# Patient Record
Sex: FEMALE | Race: BLACK OR AFRICAN AMERICAN | NOT HISPANIC OR LATINO | URBAN - METROPOLITAN AREA
[De-identification: names, ages, dates, MRNs, and addresses within clinical notes are randomized per-mention and may not be internally consistent; named-entity substitution may affect disease eponyms.]

---

## 2017-04-10 ENCOUNTER — EMERGENCY (EMERGENCY)
Facility: HOSPITAL | Age: 72
LOS: 1 days | Discharge: HOME | End: 2017-04-10
Attending: PERSONAL EMERGENCY RESPONSE ATTENDANT | Admitting: PERSONAL EMERGENCY RESPONSE ATTENDANT
Payer: COMMERCIAL

## 2017-04-10 VITALS
HEART RATE: 71 BPM | RESPIRATION RATE: 16 BRPM | OXYGEN SATURATION: 97 % | SYSTOLIC BLOOD PRESSURE: 140 MMHG | DIASTOLIC BLOOD PRESSURE: 60 MMHG

## 2017-04-10 VITALS
SYSTOLIC BLOOD PRESSURE: 132 MMHG | OXYGEN SATURATION: 100 % | TEMPERATURE: 98 F | RESPIRATION RATE: 18 BRPM | HEART RATE: 63 BPM | DIASTOLIC BLOOD PRESSURE: 80 MMHG

## 2017-04-10 LAB
ALBUMIN SERPL ELPH-MCNC: 3.2 G/DL — LOW (ref 3.3–5)
ALP SERPL-CCNC: 67 U/L — SIGNIFICANT CHANGE UP (ref 40–120)
ALT FLD-CCNC: 10 U/L RC — SIGNIFICANT CHANGE UP (ref 10–45)
ANION GAP SERPL CALC-SCNC: 16 MMOL/L — SIGNIFICANT CHANGE UP (ref 5–17)
APTT BLD: 34 SEC — SIGNIFICANT CHANGE UP (ref 27.5–37.4)
AST SERPL-CCNC: 28 U/L — SIGNIFICANT CHANGE UP (ref 10–40)
BASOPHILS # BLD AUTO: 0.1 K/UL — SIGNIFICANT CHANGE UP (ref 0–0.2)
BASOPHILS NFR BLD AUTO: 0.4 % — SIGNIFICANT CHANGE UP (ref 0–2)
BILIRUB SERPL-MCNC: 0.5 MG/DL — SIGNIFICANT CHANGE UP (ref 0.2–1.2)
BUN SERPL-MCNC: 15 MG/DL — SIGNIFICANT CHANGE UP (ref 7–23)
CALCIUM SERPL-MCNC: 9.7 MG/DL — SIGNIFICANT CHANGE UP (ref 8.4–10.5)
CHLORIDE SERPL-SCNC: 101 MMOL/L — SIGNIFICANT CHANGE UP (ref 96–108)
CK MB BLD-MCNC: 3.4 % — SIGNIFICANT CHANGE UP (ref 0–3.5)
CK MB CFR SERPL CALC: 5.5 NG/ML — HIGH (ref 0–3.8)
CK SERPL-CCNC: 162 U/L — SIGNIFICANT CHANGE UP (ref 25–170)
CO2 SERPL-SCNC: 26 MMOL/L — SIGNIFICANT CHANGE UP (ref 22–31)
CREAT SERPL-MCNC: 0.69 MG/DL — SIGNIFICANT CHANGE UP (ref 0.5–1.3)
EOSINOPHIL # BLD AUTO: 0.2 K/UL — SIGNIFICANT CHANGE UP (ref 0–0.5)
EOSINOPHIL NFR BLD AUTO: 1 % — SIGNIFICANT CHANGE UP (ref 0–6)
GAS PNL BLDV: SIGNIFICANT CHANGE UP
GLUCOSE SERPL-MCNC: 98 MG/DL — SIGNIFICANT CHANGE UP (ref 70–99)
HCT VFR BLD CALC: 28.7 % — LOW (ref 34.5–45)
HGB BLD-MCNC: 8.8 G/DL — LOW (ref 11.5–15.5)
INR BLD: 1.91 RATIO — HIGH (ref 0.88–1.16)
LYMPHOCYTES # BLD AUTO: 1.8 K/UL — SIGNIFICANT CHANGE UP (ref 1–3.3)
LYMPHOCYTES # BLD AUTO: 9.5 % — LOW (ref 13–44)
MCHC RBC-ENTMCNC: 25 PG — LOW (ref 27–34)
MCHC RBC-ENTMCNC: 30.8 GM/DL — LOW (ref 32–36)
MCV RBC AUTO: 81.4 FL — SIGNIFICANT CHANGE UP (ref 80–100)
MONOCYTES # BLD AUTO: 1.6 K/UL — HIGH (ref 0–0.9)
MONOCYTES NFR BLD AUTO: 8.5 % — SIGNIFICANT CHANGE UP (ref 2–14)
NEUTROPHILS # BLD AUTO: 15.1 K/UL — HIGH (ref 1.8–7.4)
NEUTROPHILS NFR BLD AUTO: 80.6 % — HIGH (ref 43–77)
PLATELET # BLD AUTO: 388 K/UL — SIGNIFICANT CHANGE UP (ref 150–400)
POTASSIUM SERPL-MCNC: 4.7 MMOL/L — SIGNIFICANT CHANGE UP (ref 3.5–5.3)
POTASSIUM SERPL-SCNC: 4.7 MMOL/L — SIGNIFICANT CHANGE UP (ref 3.5–5.3)
PROT SERPL-MCNC: 7.8 G/DL — SIGNIFICANT CHANGE UP (ref 6–8.3)
PROTHROM AB SERPL-ACNC: 21.1 SEC — HIGH (ref 9.8–12.7)
RBC # BLD: 3.53 M/UL — LOW (ref 3.8–5.2)
RBC # FLD: 15.4 % — HIGH (ref 10.3–14.5)
SODIUM SERPL-SCNC: 143 MMOL/L — SIGNIFICANT CHANGE UP (ref 135–145)
TROPONIN T SERPL-MCNC: <0.01 NG/ML — SIGNIFICANT CHANGE UP (ref 0–0.06)
TROPONIN T SERPL-MCNC: <0.01 NG/ML — SIGNIFICANT CHANGE UP (ref 0–0.06)
WBC # BLD: 18.7 K/UL — HIGH (ref 3.8–10.5)
WBC # FLD AUTO: 18.7 K/UL — HIGH (ref 3.8–10.5)

## 2017-04-10 PROCEDURE — 72170 X-RAY EXAM OF PELVIS: CPT

## 2017-04-10 PROCEDURE — 73564 X-RAY EXAM KNEE 4 OR MORE: CPT | Mod: 26,50

## 2017-04-10 PROCEDURE — 85014 HEMATOCRIT: CPT

## 2017-04-10 PROCEDURE — 71260 CT THORAX DX C+: CPT

## 2017-04-10 PROCEDURE — 85730 THROMBOPLASTIN TIME PARTIAL: CPT

## 2017-04-10 PROCEDURE — 85610 PROTHROMBIN TIME: CPT

## 2017-04-10 PROCEDURE — 82962 GLUCOSE BLOOD TEST: CPT

## 2017-04-10 PROCEDURE — 82947 ASSAY GLUCOSE BLOOD QUANT: CPT

## 2017-04-10 PROCEDURE — 86301 IMMUNOASSAY TUMOR CA 19-9: CPT

## 2017-04-10 PROCEDURE — 82378 CARCINOEMBRYONIC ANTIGEN: CPT

## 2017-04-10 PROCEDURE — 80053 COMPREHEN METABOLIC PANEL: CPT

## 2017-04-10 PROCEDURE — 72170 X-RAY EXAM OF PELVIS: CPT | Mod: 26

## 2017-04-10 PROCEDURE — 74177 CT ABD & PELVIS W/CONTRAST: CPT | Mod: 26

## 2017-04-10 PROCEDURE — 70450 CT HEAD/BRAIN W/O DYE: CPT

## 2017-04-10 PROCEDURE — 71045 X-RAY EXAM CHEST 1 VIEW: CPT

## 2017-04-10 PROCEDURE — 84484 ASSAY OF TROPONIN QUANT: CPT

## 2017-04-10 PROCEDURE — 71010: CPT | Mod: 26

## 2017-04-10 PROCEDURE — 82553 CREATINE MB FRACTION: CPT

## 2017-04-10 PROCEDURE — 82550 ASSAY OF CK (CPK): CPT

## 2017-04-10 PROCEDURE — 99285 EMERGENCY DEPT VISIT HI MDM: CPT

## 2017-04-10 PROCEDURE — 72125 CT NECK SPINE W/O DYE: CPT

## 2017-04-10 PROCEDURE — 86304 IMMUNOASSAY TUMOR CA 125: CPT

## 2017-04-10 PROCEDURE — 82803 BLOOD GASES ANY COMBINATION: CPT

## 2017-04-10 PROCEDURE — 72125 CT NECK SPINE W/O DYE: CPT | Mod: 26

## 2017-04-10 PROCEDURE — 74177 CT ABD & PELVIS W/CONTRAST: CPT

## 2017-04-10 PROCEDURE — 82435 ASSAY OF BLOOD CHLORIDE: CPT

## 2017-04-10 PROCEDURE — 73564 X-RAY EXAM KNEE 4 OR MORE: CPT

## 2017-04-10 PROCEDURE — 82330 ASSAY OF CALCIUM: CPT

## 2017-04-10 PROCEDURE — 70450 CT HEAD/BRAIN W/O DYE: CPT | Mod: 26

## 2017-04-10 PROCEDURE — 71260 CT THORAX DX C+: CPT | Mod: 26

## 2017-04-10 PROCEDURE — 85027 COMPLETE CBC AUTOMATED: CPT

## 2017-04-10 PROCEDURE — 99284 EMERGENCY DEPT VISIT MOD MDM: CPT | Mod: 25

## 2017-04-10 PROCEDURE — 83605 ASSAY OF LACTIC ACID: CPT

## 2017-04-10 PROCEDURE — 84295 ASSAY OF SERUM SODIUM: CPT

## 2017-04-10 PROCEDURE — 84132 ASSAY OF SERUM POTASSIUM: CPT

## 2017-04-10 RX ORDER — SODIUM CHLORIDE 9 MG/ML
500 INJECTION INTRAMUSCULAR; INTRAVENOUS; SUBCUTANEOUS ONCE
Qty: 0 | Refills: 0 | Status: COMPLETED | OUTPATIENT
Start: 2017-04-10 | End: 2017-04-10

## 2017-04-10 RX ADMIN — SODIUM CHLORIDE 1000 MILLILITER(S): 9 INJECTION INTRAMUSCULAR; INTRAVENOUS; SUBCUTANEOUS at 12:22

## 2017-04-10 NOTE — ED PROVIDER NOTE - PROGRESS NOTE DETAILS
Patient reassessed, All results discussed, including CT findings of likely metastatic ovarian malignancy, with granddaughter in room.  All questions answered.  Gyn called for further recommendations and will see pt in ED. Pt seen and evaluated by gyn - recommending drawing of tumor markers and follow up in oncology clinic as outpatient next week. -meek luz

## 2017-04-10 NOTE — ED ADULT NURSE REASSESSMENT NOTE - NS ED NURSE REASSESS COMMENT FT1
report received from rn sam. pt. a+ox3, in no acute distress, breathing unlabored on RA. Skin warm dry and of color appropriate for ethnicity. comfort and safety maintained. family at bedside.

## 2017-04-10 NOTE — ED ADULT NURSE NOTE - OBJECTIVE STATEMENT
72 yr old female to ed via ems s/p c/o feeling dizzy, bent down to  quarter and fell down on knees. On eliquis for afib. Denies chest pain Denies sob. Cap refill wnl Conjunctiva pink Skin turgor norm.  Denies abd pain Denies n/v. No facial droop or slurred speech Moving all four extremities. Denies numbness or weakness.

## 2017-04-10 NOTE — ED PROVIDER NOTE - MEDICAL DECISION MAKING DETAILS
72F on eliquis sp fall ?syncope vs mechanical unclear story - will get labs/trops/ct head/cxr, reassess. -meek luz

## 2017-04-10 NOTE — ED PROVIDER NOTE - CARE PLAN
Principal Discharge DX:	Fall from standing, initial encounter Principal Discharge DX:	Fall from standing, initial encounter  Secondary Diagnosis:	Ovarian cancer, primary, with metastasis from ovary to other site Principal Discharge DX:	Fall from standing, initial encounter  Instructions for follow-up, activity and diet:	Follow up with gynecology-oncology in 1-2 days - call [phone] for an appointment.  Use tylenol as needed for pain. Tylenol 1000mg every 8 hours.  Read all handouts provided. Return to ED if you have worsening pain or further concerns.  Secondary Diagnosis:	Ovarian cancer, primary, with metastasis from ovary to other site Principal Discharge DX:	Fall from standing, initial encounter  Instructions for follow-up, activity and diet:	Follow up with gynecology-oncology in 1-2 days - call [phone] for an appointment.  Use tylenol as needed for pain. Tylenol 1000mg every 8 hours.  Read all handouts provided. Return to ED if you have worsening pain or further concerns.  Secondary Diagnosis:	Ovarian cancer, primary, with metastasis from ovary to other site

## 2017-04-10 NOTE — ED PROVIDER NOTE - PLAN OF CARE
Follow up with gynecology-oncology in 1-2 days - call [phone] for an appointment.  Use tylenol as needed for pain. Tylenol 1000mg every 8 hours.  Read all handouts provided. Return to ED if you have worsening pain or further concerns.

## 2017-04-10 NOTE — ED PROVIDER NOTE - ATTENDING CONTRIBUTION TO CARE
Agree with above.  Pt is a 72 yr old female presenting to ED with complaint of fall from standing with vague memory of event.  She was on a bus and felt queezy with abdominal cramping and got off the bus.  She dropped a quarter, bent over to pick it up and fell to bilateral knees.  She is on eliquis for afib.  Pt denies head injury and did not fall hitting head.  Able to ambulate s/p fall.  No hip pain. No midline neck/spinal pain.  Pt has no further abdominal discomfort.

## 2017-04-10 NOTE — ED PROVIDER NOTE - OBJECTIVE STATEMENT
72F hx afib on eliquis, HTN presents sp fall today - pt reports she took her medications on an empty stomach, went out to go shopping and got off the bus and dropped a quarter, when bent down to pick it up fell onto knees.  No lightheadedness/dizziness/chest pain/sob prior to fall.  Did not hit head no LOC.  Co bilateral knee pain.      PMD -

## 2017-04-11 LAB
CANCER AG125 SERPL-ACNC: 73 U/ML — HIGH
CANCER AG19-9 SERPL-ACNC: 33.7 U/ML — SIGNIFICANT CHANGE UP
CEA SERPL-MCNC: 1 NG/ML — SIGNIFICANT CHANGE UP (ref 0–3.8)

## 2017-04-12 NOTE — ED POST DISCHARGE NOTE - ADDITIONAL DOCUMENTATION
ARMY:  Pt was aware that these markers were sent and is aware of probable Dx and is aware she must follow-up with gyn.  Gyn aware of need for follow-up.
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
## 2017-04-14 DIAGNOSIS — S80.02XA CONTUSION OF LEFT KNEE, INITIAL ENCOUNTER: ICD-10-CM

## 2017-04-14 DIAGNOSIS — I10 ESSENTIAL (PRIMARY) HYPERTENSION: ICD-10-CM
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
## 2017-04-14 DIAGNOSIS — C56.9 MALIGNANT NEOPLASM OF UNSPECIFIED OVARY: ICD-10-CM

## 2017-04-14 DIAGNOSIS — M25.562 PAIN IN LEFT KNEE: ICD-10-CM
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
## 2017-04-14 DIAGNOSIS — M25.561 PAIN IN RIGHT KNEE: ICD-10-CM

## 2017-04-17 ENCOUNTER — RECORD ABSTRACTING (OUTPATIENT)
Age: 72
End: 2017-04-17

## 2017-04-17 DIAGNOSIS — Z86.79 PERSONAL HISTORY OF OTHER DISEASES OF THE CIRCULATORY SYSTEM: ICD-10-CM

## 2017-04-17 DIAGNOSIS — Z87.09 PERSONAL HISTORY OF OTHER DISEASES OF THE RESPIRATORY SYSTEM: ICD-10-CM

## 2017-04-19 ENCOUNTER — APPOINTMENT (OUTPATIENT)
Dept: GYNECOLOGIC ONCOLOGY | Facility: CLINIC | Age: 72
End: 2017-04-19

## 2017-04-19 VITALS
WEIGHT: 140 LBS | BODY MASS INDEX: 27.48 KG/M2 | DIASTOLIC BLOOD PRESSURE: 70 MMHG | HEIGHT: 60 IN | SYSTOLIC BLOOD PRESSURE: 140 MMHG

## 2017-04-19 DIAGNOSIS — K42.9 UMBILICAL HERNIA W/OUT OBSTRUCTION OR GANGRENE: ICD-10-CM

## 2017-04-19 DIAGNOSIS — R19.00 INTRA-ABDOMINAL AND PELVIC SWELLING, MASS AND LUMP, UNSPECIFIED SITE: ICD-10-CM

## 2017-04-19 DIAGNOSIS — R59.0 LOCALIZED ENLARGED LYMPH NODES: ICD-10-CM

## 2017-04-19 DIAGNOSIS — D64.9 ANEMIA, UNSPECIFIED: ICD-10-CM

## 2017-04-19 DIAGNOSIS — R97.1 ELEVATED CANCER ANTIGEN 125 [CA 125]: ICD-10-CM

## 2017-04-19 PROBLEM — Z87.09 HISTORY OF ASTHMA: Status: RESOLVED | Noted: 2017-04-19 | Resolved: 2017-04-19

## 2017-04-19 PROBLEM — Z86.79 HISTORY OF ATRIAL FIBRILLATION: Status: RESOLVED | Noted: 2017-04-19 | Resolved: 2017-04-19

## 2017-04-19 PROBLEM — Z86.79 HISTORY OF ESSENTIAL HYPERTENSION: Status: RESOLVED | Noted: 2017-04-19 | Resolved: 2017-04-19

## 2017-04-19 RX ORDER — ALBUTEROL SULFATE 90 UG/1
AEROSOL, METERED RESPIRATORY (INHALATION)
Refills: 0 | Status: ACTIVE | COMMUNITY

## 2017-04-19 RX ORDER — BUDESONIDE AND FORMOTEROL FUMARATE DIHYDRATE 160; 4.5 UG/1; UG/1
AEROSOL RESPIRATORY (INHALATION)
Refills: 0 | Status: ACTIVE | COMMUNITY

## 2017-04-19 RX ORDER — HYDROCHLOROTHIAZIDE 12.5 MG/1
12.5 CAPSULE ORAL
Refills: 0 | Status: ACTIVE | COMMUNITY

## 2017-04-19 RX ORDER — METOPROLOL SUCCINATE 25 MG/1
25 TABLET, EXTENDED RELEASE ORAL
Refills: 0 | Status: ACTIVE | COMMUNITY

## 2017-04-19 RX ORDER — LOSARTAN POTASSIUM 25 MG/1
25 TABLET, FILM COATED ORAL
Refills: 0 | Status: ACTIVE | COMMUNITY

## 2017-04-19 RX ORDER — MONTELUKAST 10 MG/1
10 TABLET, FILM COATED ORAL
Refills: 0 | Status: ACTIVE | COMMUNITY

## 2017-04-19 RX ORDER — APIXABAN 2.5 MG/1
2.5 TABLET, FILM COATED ORAL
Refills: 0 | Status: ACTIVE | COMMUNITY

## 2017-04-21 ENCOUNTER — OTHER (OUTPATIENT)
Age: 72
End: 2017-04-21

## 2017-04-28 LAB — CORE LAB BIOPSY: NORMAL

## 2017-04-30 ENCOUNTER — OUTPATIENT (OUTPATIENT)
Dept: OUTPATIENT SERVICES | Facility: HOSPITAL | Age: 72
LOS: 1 days | End: 2017-04-30
Payer: COMMERCIAL

## 2017-04-30 ENCOUNTER — APPOINTMENT (OUTPATIENT)
Dept: NUCLEAR MEDICINE | Facility: IMAGING CENTER | Age: 72
End: 2017-04-30

## 2017-04-30 DIAGNOSIS — Z00.8 ENCOUNTER FOR OTHER GENERAL EXAMINATION: ICD-10-CM

## 2017-04-30 PROCEDURE — A9552: CPT

## 2017-04-30 PROCEDURE — 78815 PET IMAGE W/CT SKULL-THIGH: CPT

## 2017-05-05 ENCOUNTER — OTHER (OUTPATIENT)
Age: 72
End: 2017-05-05

## 2017-05-09 ENCOUNTER — OUTPATIENT (OUTPATIENT)
Dept: OUTPATIENT SERVICES | Facility: HOSPITAL | Age: 72
LOS: 1 days | Discharge: HOME | End: 2017-05-09

## 2017-05-09 DIAGNOSIS — C54.9 MALIGNANT NEOPLASM OF CORPUS UTERI, UNSPECIFIED: ICD-10-CM

## 2017-05-09 PROBLEM — K42.9 UMBILICAL HERNIA: Status: ACTIVE | Noted: 2017-05-09

## 2017-05-09 PROBLEM — D64.9 ANEMIA: Status: ACTIVE | Noted: 2017-05-09

## 2017-05-11 ENCOUNTER — APPOINTMENT (OUTPATIENT)
Dept: HEMATOLOGY ONCOLOGY | Facility: CLINIC | Age: 72
End: 2017-05-11

## 2017-05-11 ENCOUNTER — RESULT REVIEW (OUTPATIENT)
Age: 72
End: 2017-05-11

## 2017-05-11 VITALS
SYSTOLIC BLOOD PRESSURE: 110 MMHG | RESPIRATION RATE: 17 BRPM | HEIGHT: 60.63 IN | OXYGEN SATURATION: 100 % | DIASTOLIC BLOOD PRESSURE: 60 MMHG | WEIGHT: 121.25 LBS | BODY MASS INDEX: 23.19 KG/M2 | HEART RATE: 88 BPM | TEMPERATURE: 97.6 F

## 2017-05-11 DIAGNOSIS — C80.1 MALIGNANT (PRIMARY) NEOPLASM, UNSPECIFIED: ICD-10-CM

## 2017-05-11 LAB
APTT BLD: 31.2 SEC
HCT VFR BLD CALC: 23.6 % — LOW (ref 34.5–45)
HGB BLD-MCNC: 7.2 G/DL — LOW (ref 11.5–15.5)
INR PPP: 1.26 RATIO
MCHC RBC-ENTMCNC: 22.1 PG — LOW (ref 27–34)
MCHC RBC-ENTMCNC: 30.6 G/DL — LOW (ref 32–36)
MCV RBC AUTO: 72.4 FL — LOW (ref 80–100)
PLATELET # BLD AUTO: 757 K/UL — HIGH (ref 150–400)
PT BLD: 14.3 SEC
RBC # BLD: 3.27 M/UL — LOW (ref 3.8–5.2)
RBC # FLD: 18 % — HIGH (ref 10.3–14.5)
WBC # BLD: 21.5 K/UL — HIGH (ref 3.8–10.5)
WBC # FLD AUTO: 21.5 K/UL — HIGH (ref 3.8–10.5)

## 2017-05-11 RX ORDER — ONDANSETRON 8 MG/1
8 TABLET ORAL
Qty: 30 | Refills: 2 | Status: ACTIVE | COMMUNITY
Start: 2017-05-11 | End: 1900-01-01

## 2017-05-11 RX ORDER — PROCHLORPERAZINE MALEATE 10 MG/1
10 TABLET ORAL EVERY 6 HOURS
Qty: 30 | Refills: 2 | Status: ACTIVE | COMMUNITY
Start: 2017-05-11 | End: 1900-01-01

## 2017-05-12 DIAGNOSIS — C54.1 MALIGNANT NEOPLASM OF ENDOMETRIUM: ICD-10-CM

## 2017-05-12 LAB
ALBUMIN SERPL ELPH-MCNC: 2.9 G/DL
ALP BLD-CCNC: 113 U/L
ALT SERPL-CCNC: 4 U/L
ANION GAP SERPL CALC-SCNC: 25 MMOL/L
AST SERPL-CCNC: 18 U/L
BILIRUB SERPL-MCNC: 1 MG/DL
BUN SERPL-MCNC: 39 MG/DL
CALCIUM SERPL-MCNC: 9.1 MG/DL
CANCER AG125 SERPL-ACNC: 208 U/ML
CEA SERPL-MCNC: 0.4 NG/ML
CHLORIDE SERPL-SCNC: 93 MMOL/L
CO2 SERPL-SCNC: 18 MMOL/L
CREAT SERPL-MCNC: 1.94 MG/DL
GLUCOSE SERPL-MCNC: 86 MG/DL
HAV IGM SER QL: NONREACTIVE
HBV CORE IGM SER QL: NONREACTIVE
HBV SURFACE AG SER QL: NONREACTIVE
HCV AB SER QL: NONREACTIVE
HCV S/CO RATIO: 0.52 S/CO
MAGNESIUM SERPL-MCNC: 2.4 MG/DL
POTASSIUM SERPL-SCNC: 4.1 MMOL/L
PROT SERPL-MCNC: 7.4 G/DL
SODIUM SERPL-SCNC: 136 MMOL/L

## 2017-05-17 ENCOUNTER — APPOINTMENT (OUTPATIENT)
Dept: INFUSION THERAPY | Facility: HOSPITAL | Age: 72
End: 2017-05-17

## 2017-05-17 ENCOUNTER — RESULT REVIEW (OUTPATIENT)
Age: 72
End: 2017-05-17

## 2017-05-17 ENCOUNTER — OUTPATIENT (OUTPATIENT)
Dept: OUTPATIENT SERVICES | Facility: HOSPITAL | Age: 72
LOS: 1 days | End: 2017-05-17
Payer: COMMERCIAL

## 2017-05-17 ENCOUNTER — OTHER (OUTPATIENT)
Age: 72
End: 2017-05-17

## 2017-05-17 LAB
ANISOCYTOSIS BLD QL: SLIGHT — SIGNIFICANT CHANGE UP
BASOPHILS # BLD AUTO: 0.1 K/UL — SIGNIFICANT CHANGE UP (ref 0–0.2)
BLD GP AB SCN SERPL QL: NEGATIVE — SIGNIFICANT CHANGE UP
ELLIPTOCYTES BLD QL SMEAR: SLIGHT — SIGNIFICANT CHANGE UP
EOSINOPHIL # BLD AUTO: 0 K/UL — SIGNIFICANT CHANGE UP (ref 0–0.5)
EOSINOPHIL NFR BLD AUTO: 1 % — SIGNIFICANT CHANGE UP (ref 0–6)
HCT VFR BLD CALC: 20.3 % — CRITICAL LOW (ref 34.5–45)
HGB BLD-MCNC: 6.3 G/DL — CRITICAL LOW (ref 11.5–15.5)
HYPOCHROMIA BLD QL: SIGNIFICANT CHANGE UP
LYMPHOCYTES # BLD AUTO: 1.8 K/UL — SIGNIFICANT CHANGE UP (ref 1–3.3)
LYMPHOCYTES # BLD AUTO: 10 % — LOW (ref 13–44)
MCHC RBC-ENTMCNC: 22 PG — LOW (ref 27–34)
MCHC RBC-ENTMCNC: 31.2 G/DL — LOW (ref 32–36)
MCV RBC AUTO: 70.6 FL — LOW (ref 80–100)
MICROCYTES BLD QL: SLIGHT — SIGNIFICANT CHANGE UP
MONOCYTES # BLD AUTO: 2 K/UL — HIGH (ref 0–0.9)
MONOCYTES NFR BLD AUTO: 11 % — SIGNIFICANT CHANGE UP (ref 2–14)
NEUTROPHILS # BLD AUTO: 23 K/UL — HIGH (ref 1.8–7.4)
NEUTROPHILS NFR BLD AUTO: 78 % — HIGH (ref 43–77)
PLAT MORPH BLD: NORMAL — SIGNIFICANT CHANGE UP
PLATELET # BLD AUTO: 671 K/UL — HIGH (ref 150–400)
POIKILOCYTOSIS BLD QL AUTO: SLIGHT — SIGNIFICANT CHANGE UP
POLYCHROMASIA BLD QL SMEAR: SLIGHT — SIGNIFICANT CHANGE UP
RBC # BLD: 2.88 M/UL — LOW (ref 3.8–5.2)
RBC # FLD: 18.8 % — HIGH (ref 10.3–14.5)
RBC BLD AUTO: ABNORMAL
RH IG SCN BLD-IMP: POSITIVE — SIGNIFICANT CHANGE UP
TARGETS BLD QL SMEAR: SLIGHT — SIGNIFICANT CHANGE UP
WBC # BLD: 26.9 K/UL — HIGH (ref 3.8–10.5)
WBC # FLD AUTO: 26.9 K/UL — HIGH (ref 3.8–10.5)

## 2017-05-18 DIAGNOSIS — R11.2 NAUSEA WITH VOMITING, UNSPECIFIED: ICD-10-CM

## 2017-05-18 DIAGNOSIS — C54.1 MALIGNANT NEOPLASM OF ENDOMETRIUM: ICD-10-CM

## 2017-05-18 DIAGNOSIS — Z51.11 ENCOUNTER FOR ANTINEOPLASTIC CHEMOTHERAPY: ICD-10-CM

## 2017-05-19 PROCEDURE — 86923 COMPATIBILITY TEST ELECTRIC: CPT

## 2017-05-19 PROCEDURE — 86901 BLOOD TYPING SEROLOGIC RH(D): CPT

## 2017-05-19 PROCEDURE — 86900 BLOOD TYPING SEROLOGIC ABO: CPT

## 2017-05-19 PROCEDURE — 86850 RBC ANTIBODY SCREEN: CPT

## 2017-05-20 ENCOUNTER — APPOINTMENT (OUTPATIENT)
Dept: INFUSION THERAPY | Facility: HOSPITAL | Age: 72
End: 2017-05-20

## 2017-05-23 DIAGNOSIS — Z51.89 ENCOUNTER FOR OTHER SPECIFIED AFTERCARE: ICD-10-CM

## 2017-05-31 ENCOUNTER — RESULT REVIEW (OUTPATIENT)
Age: 72
End: 2017-05-31

## 2017-05-31 ENCOUNTER — APPOINTMENT (OUTPATIENT)
Dept: HEMATOLOGY ONCOLOGY | Facility: CLINIC | Age: 72
End: 2017-05-31

## 2017-05-31 VITALS
RESPIRATION RATE: 16 BRPM | BODY MASS INDEX: 21.5 KG/M2 | HEART RATE: 97 BPM | SYSTOLIC BLOOD PRESSURE: 140 MMHG | DIASTOLIC BLOOD PRESSURE: 80 MMHG | OXYGEN SATURATION: 98 % | TEMPERATURE: 98.7 F | WEIGHT: 112.44 LBS

## 2017-05-31 DIAGNOSIS — E87.6 HYPOKALEMIA: ICD-10-CM

## 2017-05-31 LAB
HCT VFR BLD CALC: 29.2 % — LOW (ref 34.5–45)
HGB BLD-MCNC: 9.1 G/DL — LOW (ref 11.5–15.5)
MCHC RBC-ENTMCNC: 24.3 PG — LOW (ref 27–34)
MCHC RBC-ENTMCNC: 31.3 G/DL — LOW (ref 32–36)
MCV RBC AUTO: 77.6 FL — LOW (ref 80–100)
PLATELET # BLD AUTO: 296 K/UL — SIGNIFICANT CHANGE UP (ref 150–400)
RBC # BLD: 3.76 M/UL — LOW (ref 3.8–5.2)
RBC # FLD: 22.4 % — HIGH (ref 10.3–14.5)
WBC # BLD: 14.8 K/UL — HIGH (ref 3.8–10.5)
WBC # FLD AUTO: 14.8 K/UL — HIGH (ref 3.8–10.5)

## 2017-06-01 LAB
ALBUMIN SERPL ELPH-MCNC: 3.1 G/DL
ALP BLD-CCNC: 92 U/L
ALT SERPL-CCNC: 20 U/L
ANION GAP SERPL CALC-SCNC: 16 MMOL/L
AST SERPL-CCNC: 16 U/L
BILIRUB SERPL-MCNC: 0.5 MG/DL
BUN SERPL-MCNC: 22 MG/DL
CALCIUM SERPL-MCNC: 8.8 MG/DL
CANCER AG125 SERPL-ACNC: 78 U/ML
CEA SERPL-MCNC: 1 NG/ML
CHLORIDE SERPL-SCNC: 97 MMOL/L
CO2 SERPL-SCNC: 27 MMOL/L
CREAT SERPL-MCNC: 0.75 MG/DL
GLUCOSE SERPL-MCNC: 102 MG/DL
POTASSIUM SERPL-SCNC: 3.1 MMOL/L
PROT SERPL-MCNC: 7.4 G/DL
SODIUM SERPL-SCNC: 140 MMOL/L

## 2017-06-02 PROBLEM — E87.6 HYPOKALEMIA: Status: ACTIVE | Noted: 2017-06-02

## 2017-06-07 ENCOUNTER — APPOINTMENT (OUTPATIENT)
Dept: INFUSION THERAPY | Facility: HOSPITAL | Age: 72
End: 2017-06-07

## 2017-06-07 ENCOUNTER — LABORATORY RESULT (OUTPATIENT)
Age: 72
End: 2017-06-07

## 2017-06-07 ENCOUNTER — RESULT REVIEW (OUTPATIENT)
Age: 72
End: 2017-06-07

## 2017-06-07 LAB
HCT VFR BLD CALC: 28 % — LOW (ref 34.5–45)
HGB BLD-MCNC: 8.4 G/DL — LOW (ref 11.5–15.5)
MCHC RBC-ENTMCNC: 23.8 PG — LOW (ref 27–34)
MCHC RBC-ENTMCNC: 30 G/DL — LOW (ref 32–36)
MCV RBC AUTO: 79.1 FL — LOW (ref 80–100)
PLATELET # BLD AUTO: 896 K/UL — HIGH (ref 150–400)
RBC # BLD: 3.54 M/UL — LOW (ref 3.8–5.2)
RBC # FLD: 22.9 % — HIGH (ref 10.3–14.5)
WBC # BLD: 19.4 K/UL — HIGH (ref 3.8–10.5)
WBC # FLD AUTO: 19.4 K/UL — HIGH (ref 3.8–10.5)

## 2017-06-14 ENCOUNTER — OUTPATIENT (OUTPATIENT)
Dept: OUTPATIENT SERVICES | Facility: HOSPITAL | Age: 72
LOS: 1 days | Discharge: HOME | End: 2017-06-14

## 2017-06-14 DIAGNOSIS — C54.1 MALIGNANT NEOPLASM OF ENDOMETRIUM: ICD-10-CM

## 2017-06-21 ENCOUNTER — RESULT REVIEW (OUTPATIENT)
Age: 72
End: 2017-06-21

## 2017-06-21 ENCOUNTER — APPOINTMENT (OUTPATIENT)
Dept: HEMATOLOGY ONCOLOGY | Facility: CLINIC | Age: 72
End: 2017-06-21

## 2017-06-21 VITALS
SYSTOLIC BLOOD PRESSURE: 121 MMHG | BODY MASS INDEX: 20.91 KG/M2 | WEIGHT: 109.35 LBS | OXYGEN SATURATION: 100 % | RESPIRATION RATE: 16 BRPM | DIASTOLIC BLOOD PRESSURE: 71 MMHG | TEMPERATURE: 98.7 F | HEART RATE: 77 BPM

## 2017-06-21 LAB
HCT VFR BLD CALC: 22.5 % — LOW (ref 34.5–45)
HGB BLD-MCNC: 7 G/DL — CRITICAL LOW (ref 11.5–15.5)
MCHC RBC-ENTMCNC: 24.5 PG — LOW (ref 27–34)
MCHC RBC-ENTMCNC: 30.9 G/DL — LOW (ref 32–36)
MCV RBC AUTO: 79.3 FL — LOW (ref 80–100)
PLATELET # BLD AUTO: 442 K/UL — HIGH (ref 150–400)
RBC # BLD: 2.84 M/UL — LOW (ref 3.8–5.2)
RBC # FLD: 25.5 % — HIGH (ref 10.3–14.5)
WBC # BLD: 17.7 K/UL — HIGH (ref 3.8–10.5)
WBC # FLD AUTO: 17.7 K/UL — HIGH (ref 3.8–10.5)

## 2017-06-22 ENCOUNTER — OUTPATIENT (OUTPATIENT)
Dept: OUTPATIENT SERVICES | Facility: HOSPITAL | Age: 72
LOS: 1 days | End: 2017-06-22
Payer: COMMERCIAL

## 2017-06-22 DIAGNOSIS — C54.1 MALIGNANT NEOPLASM OF ENDOMETRIUM: ICD-10-CM

## 2017-06-22 LAB
ALBUMIN SERPL ELPH-MCNC: 2.8 G/DL
ALP BLD-CCNC: 81 U/L
ALT SERPL-CCNC: 18 U/L
ANION GAP SERPL CALC-SCNC: 16 MMOL/L
AST SERPL-CCNC: 19 U/L
BILIRUB SERPL-MCNC: 0.4 MG/DL
BUN SERPL-MCNC: 9 MG/DL
CALCIUM SERPL-MCNC: 8.8 MG/DL
CANCER AG125 SERPL-ACNC: 34 U/ML
CEA SERPL-MCNC: 1.6 NG/ML
CHLORIDE SERPL-SCNC: 100 MMOL/L
CO2 SERPL-SCNC: 27 MMOL/L
CREAT SERPL-MCNC: 0.63 MG/DL
GLUCOSE SERPL-MCNC: 114 MG/DL
POTASSIUM SERPL-SCNC: 3.1 MMOL/L
PROT SERPL-MCNC: 6.9 G/DL
SODIUM SERPL-SCNC: 143 MMOL/L

## 2017-06-23 ENCOUNTER — RESULT REVIEW (OUTPATIENT)
Age: 72
End: 2017-06-23

## 2017-06-23 ENCOUNTER — APPOINTMENT (OUTPATIENT)
Dept: HEMATOLOGY ONCOLOGY | Facility: CLINIC | Age: 72
End: 2017-06-23

## 2017-06-23 LAB
BLD GP AB SCN SERPL QL: NEGATIVE — SIGNIFICANT CHANGE UP
HCT VFR BLD CALC: 21.2 % — LOW (ref 34.5–45)
HGB BLD-MCNC: 6.6 G/DL — CRITICAL LOW (ref 11.5–15.5)
MCHC RBC-ENTMCNC: 24.8 PG — LOW (ref 27–34)
MCHC RBC-ENTMCNC: 31.1 G/DL — LOW (ref 32–36)
MCV RBC AUTO: 79.5 FL — LOW (ref 80–100)
PLATELET # BLD AUTO: 397 K/UL — SIGNIFICANT CHANGE UP (ref 150–400)
RBC # BLD: 2.67 M/UL — LOW (ref 3.8–5.2)
RBC # FLD: 25.3 % — HIGH (ref 10.3–14.5)
RH IG SCN BLD-IMP: POSITIVE — SIGNIFICANT CHANGE UP
WBC # BLD: 24.3 K/UL — HIGH (ref 3.8–10.5)
WBC # FLD AUTO: 24.3 K/UL — HIGH (ref 3.8–10.5)

## 2017-06-24 PROCEDURE — 86900 BLOOD TYPING SEROLOGIC ABO: CPT

## 2017-06-24 PROCEDURE — 86901 BLOOD TYPING SEROLOGIC RH(D): CPT

## 2017-06-24 PROCEDURE — 86923 COMPATIBILITY TEST ELECTRIC: CPT

## 2017-06-24 PROCEDURE — 86850 RBC ANTIBODY SCREEN: CPT

## 2017-06-26 ENCOUNTER — APPOINTMENT (OUTPATIENT)
Dept: INFUSION THERAPY | Facility: HOSPITAL | Age: 72
End: 2017-06-26

## 2017-06-27 DIAGNOSIS — Z51.89 ENCOUNTER FOR OTHER SPECIFIED AFTERCARE: ICD-10-CM

## 2017-06-28 ENCOUNTER — RX RENEWAL (OUTPATIENT)
Age: 72
End: 2017-06-28

## 2017-06-28 ENCOUNTER — RESULT REVIEW (OUTPATIENT)
Age: 72
End: 2017-06-28

## 2017-06-28 ENCOUNTER — LABORATORY RESULT (OUTPATIENT)
Age: 72
End: 2017-06-28

## 2017-06-28 ENCOUNTER — APPOINTMENT (OUTPATIENT)
Dept: INFUSION THERAPY | Facility: HOSPITAL | Age: 72
End: 2017-06-28

## 2017-06-28 LAB
ANISOCYTOSIS BLD QL: SLIGHT — SIGNIFICANT CHANGE UP
BASOPHILS # BLD AUTO: 0 K/UL — SIGNIFICANT CHANGE UP (ref 0–0.2)
BUN SERPL-MCNC: 6 MG/DL — LOW (ref 7–23)
CA-I BLDA-SCNC: 1.07 MMOL/L — LOW (ref 1.12–1.3)
CHLORIDE SERPL-SCNC: 95 MMOL/L — LOW (ref 96–108)
CO2 SERPL-SCNC: 32 MMOL/L — HIGH (ref 22–31)
CREAT SERPL-MCNC: 0.5 MG/DL — SIGNIFICANT CHANGE UP (ref 0.5–1.3)
DACRYOCYTES BLD QL SMEAR: SLIGHT — SIGNIFICANT CHANGE UP
ELLIPTOCYTES BLD QL SMEAR: SLIGHT — SIGNIFICANT CHANGE UP
EOSINOPHIL # BLD AUTO: 0.1 K/UL — SIGNIFICANT CHANGE UP (ref 0–0.5)
GLUCOSE SERPL-MCNC: 95 MG/DL — SIGNIFICANT CHANGE UP (ref 70–99)
HCT VFR BLD CALC: 30.1 % — LOW (ref 34.5–45)
HGB BLD-MCNC: 9.6 G/DL — LOW (ref 11.5–15.5)
HYPOCHROMIA BLD QL: SLIGHT — SIGNIFICANT CHANGE UP
LYMPHOCYTES # BLD AUTO: 2.2 K/UL — SIGNIFICANT CHANGE UP (ref 1–3.3)
LYMPHOCYTES # BLD AUTO: 7 % — LOW (ref 13–44)
MACROCYTES BLD QL: SLIGHT — SIGNIFICANT CHANGE UP
MCHC RBC-ENTMCNC: 27.5 PG — SIGNIFICANT CHANGE UP (ref 27–34)
MCHC RBC-ENTMCNC: 32 G/DL — SIGNIFICANT CHANGE UP (ref 32–36)
MCV RBC AUTO: 86 FL — SIGNIFICANT CHANGE UP (ref 80–100)
MICROCYTES BLD QL: SLIGHT — SIGNIFICANT CHANGE UP
MONOCYTES # BLD AUTO: 1.9 K/UL — HIGH (ref 0–0.9)
MONOCYTES NFR BLD AUTO: 7 % — SIGNIFICANT CHANGE UP (ref 2–14)
NEUTROPHILS # BLD AUTO: 22.8 K/UL — HIGH (ref 1.8–7.4)
NEUTROPHILS NFR BLD AUTO: 86 % — HIGH (ref 43–77)
PLAT MORPH BLD: NORMAL — SIGNIFICANT CHANGE UP
PLATELET # BLD AUTO: 333 K/UL — SIGNIFICANT CHANGE UP (ref 150–400)
POIKILOCYTOSIS BLD QL AUTO: SLIGHT — SIGNIFICANT CHANGE UP
POLYCHROMASIA BLD QL SMEAR: SLIGHT — SIGNIFICANT CHANGE UP
POTASSIUM SERPL-MCNC: 2.8 MMOL/L — CRITICAL LOW (ref 3.5–5.3)
POTASSIUM SERPL-SCNC: 2.8 MMOL/L — CRITICAL LOW (ref 3.5–5.3)
RBC # BLD: 3.5 M/UL — LOW (ref 3.8–5.2)
RBC # FLD: 22.2 % — HIGH (ref 10.3–14.5)
RBC BLD AUTO: ABNORMAL
SODIUM SERPL-SCNC: 141 MMOL/L — SIGNIFICANT CHANGE UP (ref 135–145)
WBC # BLD: 27.1 K/UL — HIGH (ref 3.8–10.5)
WBC # FLD AUTO: 27.1 K/UL — HIGH (ref 3.8–10.5)

## 2017-06-28 RX ORDER — POTASSIUM CHLORIDE 1500 MG/1
20 TABLET, FILM COATED, EXTENDED RELEASE ORAL
Qty: 7 | Refills: 2 | Status: ACTIVE | COMMUNITY
Start: 2017-06-02 | End: 1900-01-01

## 2017-06-29 DIAGNOSIS — Z51.11 ENCOUNTER FOR ANTINEOPLASTIC CHEMOTHERAPY: ICD-10-CM

## 2017-06-29 DIAGNOSIS — R11.2 NAUSEA WITH VOMITING, UNSPECIFIED: ICD-10-CM

## 2017-06-29 DIAGNOSIS — E86.0 DEHYDRATION: ICD-10-CM

## 2017-07-10 ENCOUNTER — APPOINTMENT (OUTPATIENT)
Dept: HEMATOLOGY ONCOLOGY | Facility: CLINIC | Age: 72
End: 2017-07-10

## 2017-07-11 ENCOUNTER — FORM ENCOUNTER (OUTPATIENT)
Age: 72
End: 2017-07-11

## 2017-07-12 ENCOUNTER — APPOINTMENT (OUTPATIENT)
Dept: CT IMAGING | Facility: IMAGING CENTER | Age: 72
End: 2017-07-12

## 2017-07-12 ENCOUNTER — OUTPATIENT (OUTPATIENT)
Dept: OUTPATIENT SERVICES | Facility: HOSPITAL | Age: 72
LOS: 1 days | End: 2017-07-12
Payer: COMMERCIAL

## 2017-07-12 DIAGNOSIS — C54.1 MALIGNANT NEOPLASM OF ENDOMETRIUM: ICD-10-CM

## 2017-07-12 PROCEDURE — 82565 ASSAY OF CREATININE: CPT

## 2017-07-12 PROCEDURE — 74177 CT ABD & PELVIS W/CONTRAST: CPT

## 2017-07-12 PROCEDURE — 71260 CT THORAX DX C+: CPT

## 2017-07-13 ENCOUNTER — OUTPATIENT (OUTPATIENT)
Dept: OUTPATIENT SERVICES | Facility: HOSPITAL | Age: 72
LOS: 1 days | Discharge: HOME | End: 2017-07-13

## 2017-07-13 DIAGNOSIS — C54.1 MALIGNANT NEOPLASM OF ENDOMETRIUM: ICD-10-CM

## 2017-07-18 ENCOUNTER — RESULT REVIEW (OUTPATIENT)
Age: 72
End: 2017-07-18

## 2017-07-18 ENCOUNTER — APPOINTMENT (OUTPATIENT)
Dept: HEMATOLOGY ONCOLOGY | Facility: CLINIC | Age: 72
End: 2017-07-18

## 2017-07-18 VITALS
BODY MASS INDEX: 21.38 KG/M2 | SYSTOLIC BLOOD PRESSURE: 191 MMHG | RESPIRATION RATE: 16 BRPM | HEART RATE: 56 BPM | DIASTOLIC BLOOD PRESSURE: 81 MMHG | TEMPERATURE: 98.5 F | OXYGEN SATURATION: 100 % | WEIGHT: 111.77 LBS

## 2017-07-18 LAB
HCT VFR BLD CALC: 29 % — LOW (ref 34.5–45)
HGB BLD-MCNC: 9.5 G/DL — LOW (ref 11.5–15.5)
MCHC RBC-ENTMCNC: 29.1 PG — SIGNIFICANT CHANGE UP (ref 27–34)
MCHC RBC-ENTMCNC: 32.7 G/DL — SIGNIFICANT CHANGE UP (ref 32–36)
MCV RBC AUTO: 89.2 FL — SIGNIFICANT CHANGE UP (ref 80–100)
PLATELET # BLD AUTO: 493 K/UL — HIGH (ref 150–400)
RBC # BLD: 3.25 M/UL — LOW (ref 3.8–5.2)
RBC # FLD: 20.5 % — HIGH (ref 10.3–14.5)
WBC # BLD: 9.9 K/UL — SIGNIFICANT CHANGE UP (ref 3.8–10.5)
WBC # FLD AUTO: 9.9 K/UL — SIGNIFICANT CHANGE UP (ref 3.8–10.5)

## 2017-07-18 RX ORDER — APIXABAN 5 MG/1
5 TABLET, FILM COATED ORAL
Qty: 180 | Refills: 0 | Status: ACTIVE | COMMUNITY
Start: 2017-04-13

## 2017-07-18 RX ORDER — METOPROLOL TARTRATE 25 MG/1
25 TABLET, FILM COATED ORAL
Qty: 180 | Refills: 0 | Status: ACTIVE | COMMUNITY
Start: 2017-04-13

## 2017-07-18 RX ORDER — POTASSIUM CHLORIDE 1500 MG/1
20 TABLET, EXTENDED RELEASE ORAL
Qty: 90 | Refills: 0 | Status: ACTIVE | COMMUNITY
Start: 2017-07-10

## 2017-07-19 LAB
ALBUMIN SERPL ELPH-MCNC: 3 G/DL
ALP BLD-CCNC: 67 U/L
ALT SERPL-CCNC: 9 U/L
ANION GAP SERPL CALC-SCNC: 15 MMOL/L
AST SERPL-CCNC: 13 U/L
BILIRUB SERPL-MCNC: 0.2 MG/DL
BUN SERPL-MCNC: 8 MG/DL
CALCIUM SERPL-MCNC: 9.6 MG/DL
CANCER AG125 SERPL-ACNC: 24 U/ML
CEA SERPL-MCNC: 1.6 NG/ML
CHLORIDE SERPL-SCNC: 104 MMOL/L
CO2 SERPL-SCNC: 24 MMOL/L
CREAT SERPL-MCNC: 0.66 MG/DL
GLUCOSE SERPL-MCNC: 90 MG/DL
MAGNESIUM SERPL-MCNC: 1.5 MG/DL
POTASSIUM SERPL-SCNC: 4.9 MMOL/L
PROT SERPL-MCNC: 6.9 G/DL
SODIUM SERPL-SCNC: 143 MMOL/L

## 2017-07-26 ENCOUNTER — RX RENEWAL (OUTPATIENT)
Age: 72
End: 2017-07-26

## 2017-07-26 ENCOUNTER — RESULT REVIEW (OUTPATIENT)
Age: 72
End: 2017-07-26

## 2017-07-26 ENCOUNTER — APPOINTMENT (OUTPATIENT)
Dept: INFUSION THERAPY | Facility: HOSPITAL | Age: 72
End: 2017-07-26

## 2017-07-26 LAB
HCT VFR BLD CALC: 31.9 % — LOW (ref 34.5–45)
HGB BLD-MCNC: 10.2 G/DL — LOW (ref 11.5–15.5)
MCHC RBC-ENTMCNC: 28.7 PG — SIGNIFICANT CHANGE UP (ref 27–34)
MCHC RBC-ENTMCNC: 31.9 G/DL — LOW (ref 32–36)
MCV RBC AUTO: 89.9 FL — SIGNIFICANT CHANGE UP (ref 80–100)
PLATELET # BLD AUTO: 301 K/UL — SIGNIFICANT CHANGE UP (ref 150–400)
RBC # BLD: 3.55 M/UL — LOW (ref 3.8–5.2)
RBC # FLD: 20.3 % — HIGH (ref 10.3–14.5)
WBC # BLD: 8.1 K/UL — SIGNIFICANT CHANGE UP (ref 3.8–10.5)
WBC # FLD AUTO: 8.1 K/UL — SIGNIFICANT CHANGE UP (ref 3.8–10.5)

## 2017-07-27 DIAGNOSIS — Z51.11 ENCOUNTER FOR ANTINEOPLASTIC CHEMOTHERAPY: ICD-10-CM

## 2017-07-27 DIAGNOSIS — R11.2 NAUSEA WITH VOMITING, UNSPECIFIED: ICD-10-CM

## 2017-08-09 ENCOUNTER — APPOINTMENT (OUTPATIENT)
Dept: HEMATOLOGY ONCOLOGY | Facility: CLINIC | Age: 72
End: 2017-08-09
Payer: COMMERCIAL

## 2017-08-09 ENCOUNTER — RESULT REVIEW (OUTPATIENT)
Age: 72
End: 2017-08-09

## 2017-08-09 VITALS
BODY MASS INDEX: 20.03 KG/M2 | HEART RATE: 67 BPM | TEMPERATURE: 98.8 F | SYSTOLIC BLOOD PRESSURE: 177 MMHG | RESPIRATION RATE: 16 BRPM | WEIGHT: 104.72 LBS | OXYGEN SATURATION: 100 % | DIASTOLIC BLOOD PRESSURE: 79 MMHG

## 2017-08-09 LAB
HCT VFR BLD CALC: 28.2 % — LOW (ref 34.5–45)
HGB BLD-MCNC: 9.6 G/DL — LOW (ref 11.5–15.5)
MCHC RBC-ENTMCNC: 30.6 PG — SIGNIFICANT CHANGE UP (ref 27–34)
MCHC RBC-ENTMCNC: 34.1 G/DL — SIGNIFICANT CHANGE UP (ref 32–36)
MCV RBC AUTO: 89.8 FL — SIGNIFICANT CHANGE UP (ref 80–100)
PLATELET # BLD AUTO: 253 K/UL — SIGNIFICANT CHANGE UP (ref 150–400)
RBC # BLD: 3.14 M/UL — LOW (ref 3.8–5.2)
RBC # FLD: 16.4 % — HIGH (ref 10.3–14.5)
WBC # BLD: 4.1 K/UL — SIGNIFICANT CHANGE UP (ref 3.8–10.5)
WBC # FLD AUTO: 4.1 K/UL — SIGNIFICANT CHANGE UP (ref 3.8–10.5)

## 2017-08-09 PROCEDURE — 99213 OFFICE O/P EST LOW 20 MIN: CPT

## 2017-08-10 LAB
ALBUMIN SERPL ELPH-MCNC: 3.6 G/DL
ALP BLD-CCNC: 66 U/L
ALT SERPL-CCNC: 6 U/L
ANION GAP SERPL CALC-SCNC: 17 MMOL/L
AST SERPL-CCNC: 13 U/L
BILIRUB SERPL-MCNC: 0.4 MG/DL
BUN SERPL-MCNC: 9 MG/DL
CALCIUM SERPL-MCNC: 9.2 MG/DL
CANCER AG125 SERPL-ACNC: 13 U/ML
CEA SERPL-MCNC: 1.4 NG/ML
CHLORIDE SERPL-SCNC: 98 MMOL/L
CO2 SERPL-SCNC: 26 MMOL/L
CREAT SERPL-MCNC: 0.55 MG/DL
GLUCOSE SERPL-MCNC: 104 MG/DL
MAGNESIUM SERPL-MCNC: 1.6 MG/DL
POTASSIUM SERPL-SCNC: 3.7 MMOL/L
PROT SERPL-MCNC: 7.3 G/DL
SODIUM SERPL-SCNC: 141 MMOL/L

## 2017-08-11 ENCOUNTER — OUTPATIENT (OUTPATIENT)
Dept: OUTPATIENT SERVICES | Facility: HOSPITAL | Age: 72
LOS: 1 days | Discharge: HOME | End: 2017-08-11

## 2017-08-11 DIAGNOSIS — C54.1 MALIGNANT NEOPLASM OF ENDOMETRIUM: ICD-10-CM

## 2017-08-16 ENCOUNTER — APPOINTMENT (OUTPATIENT)
Dept: INFUSION THERAPY | Facility: HOSPITAL | Age: 72
End: 2017-08-16

## 2017-08-18 ENCOUNTER — RX RENEWAL (OUTPATIENT)
Age: 72
End: 2017-08-18

## 2017-08-20 ENCOUNTER — EMERGENCY (EMERGENCY)
Facility: HOSPITAL | Age: 72
LOS: 1 days | Discharge: HOME | End: 2017-08-20
Attending: EMERGENCY MEDICINE | Admitting: EMERGENCY MEDICINE
Payer: COMMERCIAL

## 2017-08-20 VITALS
DIASTOLIC BLOOD PRESSURE: 79 MMHG | TEMPERATURE: 99 F | SYSTOLIC BLOOD PRESSURE: 171 MMHG | HEART RATE: 65 BPM | RESPIRATION RATE: 18 BRPM | OXYGEN SATURATION: 100 %

## 2017-08-20 VITALS
HEART RATE: 65 BPM | RESPIRATION RATE: 14 BRPM | DIASTOLIC BLOOD PRESSURE: 88 MMHG | SYSTOLIC BLOOD PRESSURE: 168 MMHG | OXYGEN SATURATION: 100 %

## 2017-08-20 LAB
ALBUMIN SERPL ELPH-MCNC: 3.8 G/DL — SIGNIFICANT CHANGE UP (ref 3.3–5)
ALP SERPL-CCNC: 61 U/L — SIGNIFICANT CHANGE UP (ref 40–120)
ALT FLD-CCNC: 6 U/L — SIGNIFICANT CHANGE UP (ref 4–33)
AST SERPL-CCNC: 14 U/L — SIGNIFICANT CHANGE UP (ref 4–32)
BASOPHILS # BLD AUTO: 0.02 K/UL — SIGNIFICANT CHANGE UP (ref 0–0.2)
BASOPHILS NFR BLD AUTO: 0.3 % — SIGNIFICANT CHANGE UP (ref 0–2)
BILIRUB SERPL-MCNC: 0.6 MG/DL — SIGNIFICANT CHANGE UP (ref 0.2–1.2)
BUN SERPL-MCNC: 9 MG/DL — SIGNIFICANT CHANGE UP (ref 7–23)
CALCIUM SERPL-MCNC: 10.3 MG/DL — SIGNIFICANT CHANGE UP (ref 8.4–10.5)
CHLORIDE SERPL-SCNC: 101 MMOL/L — SIGNIFICANT CHANGE UP (ref 98–107)
CO2 SERPL-SCNC: 27 MMOL/L — SIGNIFICANT CHANGE UP (ref 22–31)
CREAT SERPL-MCNC: 0.58 MG/DL — SIGNIFICANT CHANGE UP (ref 0.5–1.3)
EOSINOPHIL # BLD AUTO: 0.03 K/UL — SIGNIFICANT CHANGE UP (ref 0–0.5)
EOSINOPHIL NFR BLD AUTO: 0.5 % — SIGNIFICANT CHANGE UP (ref 0–6)
GLUCOSE SERPL-MCNC: 91 MG/DL — SIGNIFICANT CHANGE UP (ref 70–99)
HCT VFR BLD CALC: 31.7 % — LOW (ref 34.5–45)
HGB BLD-MCNC: 10.1 G/DL — LOW (ref 11.5–15.5)
IMM GRANULOCYTES # BLD AUTO: 0.02 # — SIGNIFICANT CHANGE UP
IMM GRANULOCYTES NFR BLD AUTO: 0.3 % — SIGNIFICANT CHANGE UP (ref 0–1.5)
LYMPHOCYTES # BLD AUTO: 1.88 K/UL — SIGNIFICANT CHANGE UP (ref 1–3.3)
LYMPHOCYTES # BLD AUTO: 29.7 % — SIGNIFICANT CHANGE UP (ref 13–44)
MCHC RBC-ENTMCNC: 28.1 PG — SIGNIFICANT CHANGE UP (ref 27–34)
MCHC RBC-ENTMCNC: 31.9 % — LOW (ref 32–36)
MCV RBC AUTO: 88.3 FL — SIGNIFICANT CHANGE UP (ref 80–100)
MONOCYTES # BLD AUTO: 0.51 K/UL — SIGNIFICANT CHANGE UP (ref 0–0.9)
MONOCYTES NFR BLD AUTO: 8 % — SIGNIFICANT CHANGE UP (ref 2–14)
NEUTROPHILS # BLD AUTO: 3.88 K/UL — SIGNIFICANT CHANGE UP (ref 1.8–7.4)
NEUTROPHILS NFR BLD AUTO: 61.2 % — SIGNIFICANT CHANGE UP (ref 43–77)
NRBC # FLD: 0 — SIGNIFICANT CHANGE UP
PLATELET # BLD AUTO: 297 K/UL — SIGNIFICANT CHANGE UP (ref 150–400)
PMV BLD: 9.6 FL — SIGNIFICANT CHANGE UP (ref 7–13)
POTASSIUM SERPL-MCNC: 4.3 MMOL/L — SIGNIFICANT CHANGE UP (ref 3.5–5.3)
POTASSIUM SERPL-SCNC: 4.3 MMOL/L — SIGNIFICANT CHANGE UP (ref 3.5–5.3)
PROT SERPL-MCNC: 7.7 G/DL — SIGNIFICANT CHANGE UP (ref 6–8.3)
RBC # BLD: 3.59 M/UL — LOW (ref 3.8–5.2)
RBC # FLD: 17 % — HIGH (ref 10.3–14.5)
SODIUM SERPL-SCNC: 142 MMOL/L — SIGNIFICANT CHANGE UP (ref 135–145)
WBC # BLD: 6.34 K/UL — SIGNIFICANT CHANGE UP (ref 3.8–10.5)
WBC # FLD AUTO: 6.34 K/UL — SIGNIFICANT CHANGE UP (ref 3.8–10.5)

## 2017-08-20 PROCEDURE — 99285 EMERGENCY DEPT VISIT HI MDM: CPT

## 2017-08-20 RX ORDER — OXYCODONE AND ACETAMINOPHEN 5; 325 MG/1; MG/1
2 TABLET ORAL ONCE
Qty: 0 | Refills: 0 | Status: DISCONTINUED | OUTPATIENT
Start: 2017-08-20 | End: 2017-08-20

## 2017-08-20 RX ORDER — DOCUSATE SODIUM 100 MG
1 CAPSULE ORAL
Qty: 4 | Refills: 0 | OUTPATIENT
Start: 2017-08-20 | End: 2017-08-22

## 2017-08-20 RX ORDER — LOSARTAN POTASSIUM 100 MG/1
25 TABLET, FILM COATED ORAL DAILY
Qty: 0 | Refills: 0 | Status: DISCONTINUED | OUTPATIENT
Start: 2017-08-20 | End: 2017-08-24

## 2017-08-20 RX ORDER — AMLODIPINE BESYLATE 2.5 MG/1
5 TABLET ORAL ONCE
Qty: 0 | Refills: 0 | Status: COMPLETED | OUTPATIENT
Start: 2017-08-20 | End: 2017-08-20

## 2017-08-20 RX ORDER — OXYCODONE HYDROCHLORIDE 5 MG/1
1 TABLET ORAL
Qty: 16 | Refills: 0 | OUTPATIENT
Start: 2017-08-20 | End: 2017-08-22

## 2017-08-20 RX ORDER — METOPROLOL TARTRATE 50 MG
25 TABLET ORAL DAILY
Qty: 0 | Refills: 0 | Status: DISCONTINUED | OUTPATIENT
Start: 2017-08-20 | End: 2017-08-24

## 2017-08-20 RX ADMIN — OXYCODONE AND ACETAMINOPHEN 2 TABLET(S): 5; 325 TABLET ORAL at 10:59

## 2017-08-20 RX ADMIN — OXYCODONE AND ACETAMINOPHEN 2 TABLET(S): 5; 325 TABLET ORAL at 11:30

## 2017-08-20 RX ADMIN — AMLODIPINE BESYLATE 5 MILLIGRAM(S): 2.5 TABLET ORAL at 10:59

## 2017-08-20 RX ADMIN — Medication 25 MILLIGRAM(S): at 10:59

## 2017-08-20 RX ADMIN — LOSARTAN POTASSIUM 25 MILLIGRAM(S): 100 TABLET, FILM COATED ORAL at 10:59

## 2017-08-20 NOTE — ED PROVIDER NOTE - PROGRESS NOTE DETAILS
Pt stable for discharge. Improved with percocet. Will send with 2 day supply of percocet and colace. Follow up with PCP within 24-48 hours.  Istop # [id number]

## 2017-08-20 NOTE — ED PROVIDER NOTE - ATTENDING CONTRIBUTION TO CARE
Seen and examined, mild pain after medication in ED, able to range RLE fully, NT hip/ankle, pain to palp. of knee, no effusion, NT calves, equal girth, good pulses distally.

## 2017-08-20 NOTE — ED ADULT NURSE NOTE - CAS EDN DISCHARGE ASSESSMENT
Alert and oriented to person, place and time/Patient baseline mental status/No adverse reaction to first time med in ED/Awake

## 2017-08-20 NOTE — ED ADULT NURSE NOTE - OBJECTIVE STATEMENT
Pt is 71yo F, received to room AxOx4, able to make need known verbally. Pt is c/o intermittent pain in the right lower leg accompanying with numbness and tingling started approx 2 week ago. Denies any SOB, or other pain/discomfort at this time. Pt also states she is currently on chemo for endometrial CA, last dose was approx two week ago. Denies any N&V at this time. EDMD is aware and at bedside evaluating. VS are as noted, MD is aware of elevated BP. Pending lab. will continue to monitor.

## 2017-08-20 NOTE — ED PROVIDER NOTE - MEDICAL DECISION MAKING DETAILS
72 year old F hx of endometrial ca (chemo carboplantin/taxol, last 8/9/17), afib, HTN, asthma presents with LLE pain, likely neuropathic pain. Pt has no swelling, tenderness of LLE, on eliquis for afib, Wells score -1, not likely DVT. No sings of erythema, warmth, not cellulitis. Will give 2 tabs of percocet and reaccess for pain relief. Will obtain basic labs given recent chemo, CBC, CMP.

## 2017-08-20 NOTE — ED PROVIDER NOTE - FAMILY HISTORY
Sibling  Still living? Unknown  Family history of amyotrophic lateral sclerosis, Age at diagnosis: Age Unknown     Uncle  Still living? Unknown  Family history of cystic fibrosis, Age at diagnosis: Age Unknown

## 2017-08-20 NOTE — ED PROVIDER NOTE - MUSCULOSKELETAL MINIMAL EXAM
normal range of motion/normal of LLE (hips, knees, ankles, toes b/l), no swelling. Slight swelling of R knee.

## 2017-08-20 NOTE — ED PROVIDER NOTE - OBJECTIVE STATEMENT
72 year old F hx of endometrial ca (chemo carboplantin/taxol, last 8/9/17), afib, HTN, asthma, presents with L leg pain since last Thursday. Pain is warm sensation traveling from R plantar aspect of foot and radiates to back of knee. Pt describes pain as 10/10, intermittent, comes every 2 minutes, last for 30 seconds. Denies any swelling, redness, leg swelling. Denies any fevers, chills. Pt says this is similar to neuropathic pain that she gets with chemo but more intense, and has lasted longer. Pt says she usually gets the pain 3-4 days after chemo, and lasts only 3 days. Pt says that pain has persisted this time. Pt says that she skipped last chemo session roman 72 year old F hx of endometrial ca (chemo carboplantin/taxol, last 8/9/17), afib, HTN, asthma, presents with L leg pain since last Thursday 8/10 a day after her chemo session . Pain is warm sensation traveling from R plantar aspect of foot and radiates to back of knee. Pt describes pain as 10/10, intermittent, comes every 2 minutes, last for 30 seconds. Denies any swelling, redness, leg swelling. Denies any fevers, chills. Pt says this is similar to neuropathic pain that she gets with chemo but more intense, and has lasted longer. Pt says she usually gets the pain 3-4 days after chemo, and lasts only 3 days. No focal weakness. Pt says that pain has persisted this time. Pt says that she skipped last chemo session because of the neuropathic pain.

## 2017-08-23 ENCOUNTER — APPOINTMENT (OUTPATIENT)
Dept: INFUSION THERAPY | Facility: HOSPITAL | Age: 72
End: 2017-08-23

## 2017-08-23 ENCOUNTER — RESULT REVIEW (OUTPATIENT)
Age: 72
End: 2017-08-23

## 2017-08-23 LAB
HCT VFR BLD CALC: 33.2 % — LOW (ref 34.5–45)
HGB BLD-MCNC: 10.8 G/DL — LOW (ref 11.5–15.5)
MCHC RBC-ENTMCNC: 29.2 PG — SIGNIFICANT CHANGE UP (ref 27–34)
MCHC RBC-ENTMCNC: 32.5 G/DL — SIGNIFICANT CHANGE UP (ref 32–36)
MCV RBC AUTO: 89.8 FL — SIGNIFICANT CHANGE UP (ref 80–100)
PLATELET # BLD AUTO: 303 K/UL — SIGNIFICANT CHANGE UP (ref 150–400)
RBC # BLD: 3.7 M/UL — LOW (ref 3.8–5.2)
RBC # FLD: 16.4 % — HIGH (ref 10.3–14.5)
WBC # BLD: 9.5 K/UL — SIGNIFICANT CHANGE UP (ref 3.8–10.5)
WBC # FLD AUTO: 9.5 K/UL — SIGNIFICANT CHANGE UP (ref 3.8–10.5)

## 2017-08-24 DIAGNOSIS — R11.2 NAUSEA WITH VOMITING, UNSPECIFIED: ICD-10-CM

## 2017-08-24 DIAGNOSIS — Z51.11 ENCOUNTER FOR ANTINEOPLASTIC CHEMOTHERAPY: ICD-10-CM

## 2017-08-30 ENCOUNTER — RESULT REVIEW (OUTPATIENT)
Age: 72
End: 2017-08-30

## 2017-08-30 ENCOUNTER — APPOINTMENT (OUTPATIENT)
Dept: HEMATOLOGY ONCOLOGY | Facility: CLINIC | Age: 72
End: 2017-08-30
Payer: COMMERCIAL

## 2017-08-30 VITALS
SYSTOLIC BLOOD PRESSURE: 132 MMHG | TEMPERATURE: 97.3 F | RESPIRATION RATE: 16 BRPM | BODY MASS INDEX: 18.55 KG/M2 | DIASTOLIC BLOOD PRESSURE: 72 MMHG | OXYGEN SATURATION: 99 % | WEIGHT: 97 LBS | HEART RATE: 66 BPM

## 2017-08-30 LAB
ALBUMIN SERPL ELPH-MCNC: 3.9 G/DL
ALP BLD-CCNC: 59 U/L
ALT SERPL-CCNC: 11 U/L
ANION GAP SERPL CALC-SCNC: 13 MMOL/L
AST SERPL-CCNC: 19 U/L
BASOPHILS # BLD AUTO: 0 K/UL — SIGNIFICANT CHANGE UP (ref 0–0.2)
BASOPHILS NFR BLD AUTO: 0.8 % — SIGNIFICANT CHANGE UP (ref 0–2)
BILIRUB SERPL-MCNC: 0.5 MG/DL
BUN SERPL-MCNC: 21 MG/DL
CALCIUM SERPL-MCNC: 10.3 MG/DL
CANCER AG125 SERPL-ACNC: 12 U/ML
CHLORIDE SERPL-SCNC: 99 MMOL/L
CO2 SERPL-SCNC: 28 MMOL/L
CREAT SERPL-MCNC: 0.95 MG/DL
EOSINOPHIL # BLD AUTO: 0 K/UL — SIGNIFICANT CHANGE UP (ref 0–0.5)
EOSINOPHIL NFR BLD AUTO: 1.3 % — SIGNIFICANT CHANGE UP (ref 0–6)
GLUCOSE SERPL-MCNC: 128 MG/DL
HCT VFR BLD CALC: 30.8 % — LOW (ref 34.5–45)
HGB BLD-MCNC: 9.8 G/DL — LOW (ref 11.5–15.5)
LYMPHOCYTES # BLD AUTO: 1.1 K/UL — SIGNIFICANT CHANGE UP (ref 1–3.3)
LYMPHOCYTES # BLD AUTO: 38.3 % — SIGNIFICANT CHANGE UP (ref 13–44)
MCHC RBC-ENTMCNC: 28.6 PG — SIGNIFICANT CHANGE UP (ref 27–34)
MCHC RBC-ENTMCNC: 31.9 G/DL — LOW (ref 32–36)
MCV RBC AUTO: 89.6 FL — SIGNIFICANT CHANGE UP (ref 80–100)
MONOCYTES # BLD AUTO: 0 K/UL — SIGNIFICANT CHANGE UP (ref 0–0.9)
MONOCYTES NFR BLD AUTO: 1.5 % — LOW (ref 2–14)
NEUTROPHILS # BLD AUTO: 1.7 K/UL — LOW (ref 1.8–7.4)
NEUTROPHILS NFR BLD AUTO: 58.2 % — SIGNIFICANT CHANGE UP (ref 43–77)
PLATELET # BLD AUTO: 203 K/UL — SIGNIFICANT CHANGE UP (ref 150–400)
POTASSIUM SERPL-SCNC: 4.1 MMOL/L
PROT SERPL-MCNC: 7.5 G/DL
RBC # BLD: 3.44 M/UL — LOW (ref 3.8–5.2)
RBC # FLD: 16.9 % — HIGH (ref 10.3–14.5)
SODIUM SERPL-SCNC: 140 MMOL/L
WBC # BLD: 3 K/UL — LOW (ref 3.8–10.5)
WBC # FLD AUTO: 3 K/UL — LOW (ref 3.8–10.5)

## 2017-08-30 PROCEDURE — 99214 OFFICE O/P EST MOD 30 MIN: CPT

## 2017-08-30 RX ORDER — DOCUSATE SODIUM 100 MG/1
100 CAPSULE, LIQUID FILLED ORAL
Qty: 4 | Refills: 0 | Status: ACTIVE | COMMUNITY
Start: 2017-08-20

## 2017-08-31 LAB — CEA SERPL-MCNC: 1.6 NG/ML

## 2017-09-01 ENCOUNTER — RX RENEWAL (OUTPATIENT)
Age: 72
End: 2017-09-01

## 2017-09-01 RX ORDER — OXYCODONE 5 MG/1
5 TABLET ORAL
Qty: 30 | Refills: 0 | Status: ACTIVE | COMMUNITY
Start: 2017-05-23 | End: 1900-01-01

## 2017-09-11 ENCOUNTER — OUTPATIENT (OUTPATIENT)
Dept: OUTPATIENT SERVICES | Facility: HOSPITAL | Age: 72
LOS: 1 days | Discharge: HOME | End: 2017-09-11

## 2017-09-11 DIAGNOSIS — C54.1 MALIGNANT NEOPLASM OF ENDOMETRIUM: ICD-10-CM

## 2017-09-13 ENCOUNTER — APPOINTMENT (OUTPATIENT)
Dept: INFUSION THERAPY | Facility: HOSPITAL | Age: 72
End: 2017-09-13

## 2017-09-13 ENCOUNTER — APPOINTMENT (OUTPATIENT)
Dept: HEMATOLOGY ONCOLOGY | Facility: CLINIC | Age: 72
End: 2017-09-13

## 2017-09-13 ENCOUNTER — RESULT REVIEW (OUTPATIENT)
Age: 72
End: 2017-09-13

## 2017-09-13 DIAGNOSIS — G62.9 POLYNEUROPATHY, UNSPECIFIED: ICD-10-CM

## 2017-09-13 LAB
HCT VFR BLD CALC: 27.1 % — LOW (ref 34.5–45)
HGB BLD-MCNC: 9.3 G/DL — LOW (ref 11.5–15.5)
MCHC RBC-ENTMCNC: 31 PG — SIGNIFICANT CHANGE UP (ref 27–34)
MCHC RBC-ENTMCNC: 34.2 G/DL — SIGNIFICANT CHANGE UP (ref 32–36)
MCV RBC AUTO: 90.4 FL — SIGNIFICANT CHANGE UP (ref 80–100)
PLATELET # BLD AUTO: 231 K/UL — SIGNIFICANT CHANGE UP (ref 150–400)
RBC # BLD: 2.99 M/UL — LOW (ref 3.8–5.2)
RBC # FLD: 15.1 % — HIGH (ref 10.3–14.5)
WBC # BLD: 7.3 K/UL — SIGNIFICANT CHANGE UP (ref 3.8–10.5)
WBC # FLD AUTO: 7.3 K/UL — SIGNIFICANT CHANGE UP (ref 3.8–10.5)

## 2017-09-13 RX ORDER — OXYCODONE AND ACETAMINOPHEN 5; 325 MG/1; MG/1
5-325 TABLET ORAL
Qty: 30 | Refills: 0 | Status: ACTIVE | COMMUNITY
Start: 2017-08-20 | End: 1900-01-01

## 2017-09-14 DIAGNOSIS — Z51.11 ENCOUNTER FOR ANTINEOPLASTIC CHEMOTHERAPY: ICD-10-CM

## 2017-09-14 DIAGNOSIS — R11.2 NAUSEA WITH VOMITING, UNSPECIFIED: ICD-10-CM

## 2017-09-18 ENCOUNTER — RX RENEWAL (OUTPATIENT)
Age: 72
End: 2017-09-18

## 2017-09-18 RX ORDER — GABAPENTIN 100 MG/1
100 CAPSULE ORAL
Qty: 90 | Refills: 3 | Status: DISCONTINUED | COMMUNITY
Start: 2017-07-26 | End: 2017-09-18

## 2017-09-25 ENCOUNTER — RESULT REVIEW (OUTPATIENT)
Age: 72
End: 2017-09-25

## 2017-09-25 ENCOUNTER — APPOINTMENT (OUTPATIENT)
Dept: HEMATOLOGY ONCOLOGY | Facility: CLINIC | Age: 72
End: 2017-09-25
Payer: COMMERCIAL

## 2017-09-25 VITALS
DIASTOLIC BLOOD PRESSURE: 74 MMHG | TEMPERATURE: 99.4 F | OXYGEN SATURATION: 99 % | BODY MASS INDEX: 18.34 KG/M2 | HEART RATE: 94 BPM | RESPIRATION RATE: 16 BRPM | SYSTOLIC BLOOD PRESSURE: 130 MMHG | WEIGHT: 95.9 LBS

## 2017-09-25 LAB
BASOPHILS # BLD AUTO: 0 K/UL — SIGNIFICANT CHANGE UP (ref 0–0.2)
BASOPHILS NFR BLD AUTO: 0.3 % — SIGNIFICANT CHANGE UP (ref 0–2)
EOSINOPHIL # BLD AUTO: 0 K/UL — SIGNIFICANT CHANGE UP (ref 0–0.5)
EOSINOPHIL NFR BLD AUTO: 1 % — SIGNIFICANT CHANGE UP (ref 0–6)
HCT VFR BLD CALC: 28.4 % — LOW (ref 34.5–45)
HGB BLD-MCNC: 9.1 G/DL — LOW (ref 11.5–15.5)
LYMPHOCYTES # BLD AUTO: 1.4 K/UL — SIGNIFICANT CHANGE UP (ref 1–3.3)
LYMPHOCYTES # BLD AUTO: 44 % — SIGNIFICANT CHANGE UP (ref 13–44)
MCHC RBC-ENTMCNC: 29.4 PG — SIGNIFICANT CHANGE UP (ref 27–34)
MCHC RBC-ENTMCNC: 32.1 G/DL — SIGNIFICANT CHANGE UP (ref 32–36)
MCV RBC AUTO: 91.6 FL — SIGNIFICANT CHANGE UP (ref 80–100)
MONOCYTES # BLD AUTO: 0.6 K/UL — SIGNIFICANT CHANGE UP (ref 0–0.9)
MONOCYTES NFR BLD AUTO: 18.8 % — HIGH (ref 2–14)
NEUTROPHILS # BLD AUTO: 1.2 K/UL — LOW (ref 1.8–7.4)
NEUTROPHILS NFR BLD AUTO: 35.9 % — LOW (ref 43–77)
PLAT MORPH BLD: NORMAL — SIGNIFICANT CHANGE UP
PLATELET # BLD AUTO: 232 K/UL — SIGNIFICANT CHANGE UP (ref 150–400)
RBC # BLD: 3.1 M/UL — LOW (ref 3.8–5.2)
RBC # FLD: 17.5 % — HIGH (ref 10.3–14.5)
RBC BLD AUTO: NORMAL — SIGNIFICANT CHANGE UP
WBC # BLD: 3.3 K/UL — LOW (ref 3.8–10.5)
WBC # FLD AUTO: 3.3 K/UL — LOW (ref 3.8–10.5)

## 2017-09-25 PROCEDURE — 99214 OFFICE O/P EST MOD 30 MIN: CPT

## 2017-09-26 LAB
ALBUMIN SERPL ELPH-MCNC: 3.8 G/DL
ALP BLD-CCNC: 63 U/L
ALT SERPL-CCNC: 13 U/L
ANION GAP SERPL CALC-SCNC: 13 MMOL/L
AST SERPL-CCNC: 14 U/L
BILIRUB SERPL-MCNC: 0.3 MG/DL
BUN SERPL-MCNC: 12 MG/DL
CALCIUM SERPL-MCNC: 9.9 MG/DL
CANCER AG125 SERPL-ACNC: 14 U/ML
CEA SERPL-MCNC: 2.6 NG/ML
CHLORIDE SERPL-SCNC: 99 MMOL/L
CO2 SERPL-SCNC: 27 MMOL/L
CREAT SERPL-MCNC: 0.63 MG/DL
GLUCOSE SERPL-MCNC: 115 MG/DL
MAGNESIUM SERPL-MCNC: 1.1 MG/DL
POTASSIUM SERPL-SCNC: 3.7 MMOL/L
PROT SERPL-MCNC: 7.3 G/DL
SODIUM SERPL-SCNC: 139 MMOL/L

## 2017-09-27 ENCOUNTER — RX RENEWAL (OUTPATIENT)
Age: 72
End: 2017-09-27

## 2017-09-29 ENCOUNTER — APPOINTMENT (OUTPATIENT)
Dept: GERIATRICS | Facility: CLINIC | Age: 72
End: 2017-09-29
Payer: COMMERCIAL

## 2017-09-29 VITALS
RESPIRATION RATE: 16 BRPM | SYSTOLIC BLOOD PRESSURE: 127 MMHG | WEIGHT: 95 LBS | OXYGEN SATURATION: 100 % | DIASTOLIC BLOOD PRESSURE: 78 MMHG | TEMPERATURE: 98.1 F | HEART RATE: 82 BPM | BODY MASS INDEX: 18.17 KG/M2

## 2017-09-29 DIAGNOSIS — T45.1X5A DRUG-INDUCED POLYNEUROPATHY: ICD-10-CM

## 2017-09-29 DIAGNOSIS — G62.0 DRUG-INDUCED POLYNEUROPATHY: ICD-10-CM

## 2017-09-29 PROCEDURE — 99205 OFFICE O/P NEW HI 60 MIN: CPT

## 2017-09-29 RX ORDER — AMLODIPINE BESYLATE 5 MG/1
5 TABLET ORAL
Refills: 0 | Status: DISCONTINUED | COMMUNITY
End: 2017-09-29

## 2017-10-04 ENCOUNTER — APPOINTMENT (OUTPATIENT)
Dept: CT IMAGING | Facility: IMAGING CENTER | Age: 72
End: 2017-10-04
Payer: COMMERCIAL

## 2017-10-04 ENCOUNTER — OUTPATIENT (OUTPATIENT)
Dept: OUTPATIENT SERVICES | Facility: HOSPITAL | Age: 72
LOS: 1 days | End: 2017-10-04
Payer: COMMERCIAL

## 2017-10-04 DIAGNOSIS — C54.1 MALIGNANT NEOPLASM OF ENDOMETRIUM: ICD-10-CM

## 2017-10-04 PROCEDURE — 74177 CT ABD & PELVIS W/CONTRAST: CPT | Mod: 26

## 2017-10-04 PROCEDURE — 71260 CT THORAX DX C+: CPT

## 2017-10-04 PROCEDURE — 71260 CT THORAX DX C+: CPT | Mod: 26

## 2017-10-04 PROCEDURE — 82565 ASSAY OF CREATININE: CPT

## 2017-10-04 PROCEDURE — 74177 CT ABD & PELVIS W/CONTRAST: CPT

## 2017-10-11 ENCOUNTER — OUTPATIENT (OUTPATIENT)
Dept: OUTPATIENT SERVICES | Facility: HOSPITAL | Age: 72
LOS: 1 days | Discharge: HOME | End: 2017-10-11

## 2017-10-11 DIAGNOSIS — C54.1 MALIGNANT NEOPLASM OF ENDOMETRIUM: ICD-10-CM

## 2017-10-17 ENCOUNTER — APPOINTMENT (OUTPATIENT)
Dept: INFUSION THERAPY | Facility: HOSPITAL | Age: 72
End: 2017-10-17

## 2017-10-17 ENCOUNTER — RX RENEWAL (OUTPATIENT)
Age: 72
End: 2017-10-17

## 2017-10-17 ENCOUNTER — LABORATORY RESULT (OUTPATIENT)
Age: 72
End: 2017-10-17

## 2017-10-17 ENCOUNTER — RESULT REVIEW (OUTPATIENT)
Age: 72
End: 2017-10-17

## 2017-10-17 LAB
HCT VFR BLD CALC: 29.1 % — LOW (ref 34.5–45)
HGB BLD-MCNC: 9.6 G/DL — LOW (ref 11.5–15.5)
MCHC RBC-ENTMCNC: 30 PG — SIGNIFICANT CHANGE UP (ref 27–34)
MCHC RBC-ENTMCNC: 33 G/DL — SIGNIFICANT CHANGE UP (ref 32–36)
MCV RBC AUTO: 90.9 FL — SIGNIFICANT CHANGE UP (ref 80–100)
PLATELET # BLD AUTO: 279 K/UL — SIGNIFICANT CHANGE UP (ref 150–400)
RBC # BLD: 3.2 M/UL — LOW (ref 3.8–5.2)
RBC # FLD: 15.2 % — HIGH (ref 10.3–14.5)
WBC # BLD: 6 K/UL — SIGNIFICANT CHANGE UP (ref 3.8–10.5)
WBC # FLD AUTO: 6 K/UL — SIGNIFICANT CHANGE UP (ref 3.8–10.5)

## 2017-10-17 RX ORDER — APREPITANT 80 MG/1
80 CAPSULE ORAL
Qty: 2 | Refills: 5 | Status: ACTIVE | COMMUNITY
Start: 2017-05-11 | End: 1900-01-01

## 2017-10-18 DIAGNOSIS — R11.2 NAUSEA WITH VOMITING, UNSPECIFIED: ICD-10-CM

## 2017-10-18 DIAGNOSIS — Z51.11 ENCOUNTER FOR ANTINEOPLASTIC CHEMOTHERAPY: ICD-10-CM

## 2017-10-24 DIAGNOSIS — E86.0 DEHYDRATION: ICD-10-CM

## 2017-11-02 ENCOUNTER — APPOINTMENT (OUTPATIENT)
Dept: HEMATOLOGY ONCOLOGY | Facility: CLINIC | Age: 72
End: 2017-11-02
Payer: COMMERCIAL

## 2017-11-02 ENCOUNTER — RESULT REVIEW (OUTPATIENT)
Age: 72
End: 2017-11-02

## 2017-11-02 VITALS
HEART RATE: 59 BPM | OXYGEN SATURATION: 98 % | RESPIRATION RATE: 16 BRPM | BODY MASS INDEX: 19.1 KG/M2 | SYSTOLIC BLOOD PRESSURE: 147 MMHG | DIASTOLIC BLOOD PRESSURE: 78 MMHG | TEMPERATURE: 98.1 F | WEIGHT: 99.87 LBS

## 2017-11-02 LAB
HCT VFR BLD CALC: 26.9 % — LOW (ref 34.5–45)
HGB BLD-MCNC: 9 G/DL — LOW (ref 11.5–15.5)
MCHC RBC-ENTMCNC: 30.4 PG — SIGNIFICANT CHANGE UP (ref 27–34)
MCHC RBC-ENTMCNC: 33.3 G/DL — SIGNIFICANT CHANGE UP (ref 32–36)
MCV RBC AUTO: 91.2 FL — SIGNIFICANT CHANGE UP (ref 80–100)
PLATELET # BLD AUTO: 205 K/UL — SIGNIFICANT CHANGE UP (ref 150–400)
RBC # BLD: 2.95 M/UL — LOW (ref 3.8–5.2)
RBC # FLD: 15.2 % — HIGH (ref 10.3–14.5)
WBC # BLD: 5 K/UL — SIGNIFICANT CHANGE UP (ref 3.8–10.5)
WBC # FLD AUTO: 5 K/UL — SIGNIFICANT CHANGE UP (ref 3.8–10.5)

## 2017-11-02 PROCEDURE — 99214 OFFICE O/P EST MOD 30 MIN: CPT

## 2017-11-03 LAB
ALBUMIN SERPL ELPH-MCNC: 3.7 G/DL
ALP BLD-CCNC: 59 U/L
ALT SERPL-CCNC: 12 U/L
ANION GAP SERPL CALC-SCNC: 19 MMOL/L
AST SERPL-CCNC: 16 U/L
BILIRUB SERPL-MCNC: 0.3 MG/DL
BUN SERPL-MCNC: 11 MG/DL
CALCIUM SERPL-MCNC: 9.5 MG/DL
CANCER AG125 SERPL-ACNC: 22 U/ML
CEA SERPL-MCNC: 1.6 NG/ML
CHLORIDE SERPL-SCNC: 98 MMOL/L
CO2 SERPL-SCNC: 26 MMOL/L
CREAT SERPL-MCNC: 0.53 MG/DL
GLUCOSE SERPL-MCNC: 107 MG/DL
POTASSIUM SERPL-SCNC: 3.8 MMOL/L
PROT SERPL-MCNC: 7.6 G/DL
SODIUM SERPL-SCNC: 143 MMOL/L

## 2017-11-07 ENCOUNTER — APPOINTMENT (OUTPATIENT)
Dept: INFUSION THERAPY | Facility: HOSPITAL | Age: 72
End: 2017-11-07

## 2017-11-07 ENCOUNTER — RESULT REVIEW (OUTPATIENT)
Age: 72
End: 2017-11-07

## 2017-11-07 ENCOUNTER — RX RENEWAL (OUTPATIENT)
Age: 72
End: 2017-11-07

## 2017-11-07 DIAGNOSIS — E83.42 HYPOMAGNESEMIA: ICD-10-CM

## 2017-11-07 LAB
HCT VFR BLD CALC: 24.7 % — LOW (ref 34.5–45)
HGB BLD-MCNC: 8.2 G/DL — LOW (ref 11.5–15.5)
MCHC RBC-ENTMCNC: 30.2 PG — SIGNIFICANT CHANGE UP (ref 27–34)
MCHC RBC-ENTMCNC: 33.2 G/DL — SIGNIFICANT CHANGE UP (ref 32–36)
MCV RBC AUTO: 91.1 FL — SIGNIFICANT CHANGE UP (ref 80–100)
PLATELET # BLD AUTO: 298 K/UL — SIGNIFICANT CHANGE UP (ref 150–400)
RBC # BLD: 2.71 M/UL — LOW (ref 3.8–5.2)
RBC # FLD: 15.6 % — HIGH (ref 10.3–14.5)
WBC # BLD: 7.3 K/UL — SIGNIFICANT CHANGE UP (ref 3.8–10.5)
WBC # FLD AUTO: 7.3 K/UL — SIGNIFICANT CHANGE UP (ref 3.8–10.5)

## 2017-11-07 RX ORDER — MAGNESIUM OXIDE 241.3 MG/1000MG
400 TABLET ORAL DAILY
Qty: 30 | Refills: 1 | Status: ACTIVE | COMMUNITY
Start: 2017-11-07 | End: 1900-01-01

## 2017-11-07 RX ORDER — GABAPENTIN 300 MG/1
300 CAPSULE ORAL
Qty: 120 | Refills: 1 | Status: ACTIVE | COMMUNITY
Start: 2017-09-18 | End: 1900-01-01

## 2017-11-08 ENCOUNTER — RX RENEWAL (OUTPATIENT)
Age: 72
End: 2017-11-08

## 2017-11-17 ENCOUNTER — OUTPATIENT (OUTPATIENT)
Dept: OUTPATIENT SERVICES | Facility: HOSPITAL | Age: 72
LOS: 1 days | Discharge: HOME | End: 2017-11-17

## 2017-11-17 DIAGNOSIS — C54.1 MALIGNANT NEOPLASM OF ENDOMETRIUM: ICD-10-CM

## 2017-11-24 ENCOUNTER — APPOINTMENT (OUTPATIENT)
Dept: HEMATOLOGY ONCOLOGY | Facility: CLINIC | Age: 72
End: 2017-11-24

## 2017-11-28 ENCOUNTER — INPATIENT (INPATIENT)
Facility: HOSPITAL | Age: 72
LOS: 9 days | Discharge: HOME HEALTH | End: 2017-12-08
Attending: HOSPITALIST | Admitting: HOSPITALIST
Payer: MEDICARE

## 2017-11-28 ENCOUNTER — APPOINTMENT (OUTPATIENT)
Dept: INFUSION THERAPY | Facility: HOSPITAL | Age: 72
End: 2017-11-28

## 2017-11-28 ENCOUNTER — APPOINTMENT (OUTPATIENT)
Dept: HEMATOLOGY ONCOLOGY | Facility: CLINIC | Age: 72
End: 2017-11-28

## 2017-11-28 VITALS
HEART RATE: 97 BPM | OXYGEN SATURATION: 98 % | DIASTOLIC BLOOD PRESSURE: 61 MMHG | TEMPERATURE: 98 F | RESPIRATION RATE: 20 BRPM | SYSTOLIC BLOOD PRESSURE: 128 MMHG

## 2017-11-28 DIAGNOSIS — R55 SYNCOPE AND COLLAPSE: ICD-10-CM

## 2017-11-28 LAB
ALBUMIN SERPL ELPH-MCNC: 2.7 G/DL — LOW (ref 3.3–5)
ALP SERPL-CCNC: 56 U/L — SIGNIFICANT CHANGE UP (ref 40–120)
ALT FLD-CCNC: 6 U/L — SIGNIFICANT CHANGE UP (ref 4–33)
ANISOCYTOSIS BLD QL: SLIGHT — SIGNIFICANT CHANGE UP
APTT BLD: 29 SEC — SIGNIFICANT CHANGE UP (ref 27.5–37.4)
AST SERPL-CCNC: 12 U/L — SIGNIFICANT CHANGE UP (ref 4–32)
BASOPHILS # BLD AUTO: 0.02 K/UL — SIGNIFICANT CHANGE UP (ref 0–0.2)
BASOPHILS NFR BLD AUTO: 0.1 % — SIGNIFICANT CHANGE UP (ref 0–2)
BASOPHILS NFR SPEC: 0 % — SIGNIFICANT CHANGE UP (ref 0–2)
BILIRUB SERPL-MCNC: 1.1 MG/DL — SIGNIFICANT CHANGE UP (ref 0.2–1.2)
BLD GP AB SCN SERPL QL: NEGATIVE — SIGNIFICANT CHANGE UP
BUN SERPL-MCNC: 12 MG/DL — SIGNIFICANT CHANGE UP (ref 7–23)
CALCIUM SERPL-MCNC: 8.5 MG/DL — SIGNIFICANT CHANGE UP (ref 8.4–10.5)
CHLORIDE SERPL-SCNC: 95 MMOL/L — LOW (ref 98–107)
CK MB BLD-MCNC: 1 NG/ML — SIGNIFICANT CHANGE UP (ref 1–4.7)
CK SERPL-CCNC: 65 U/L — SIGNIFICANT CHANGE UP (ref 25–170)
CO2 SERPL-SCNC: 26 MMOL/L — SIGNIFICANT CHANGE UP (ref 22–31)
CREAT SERPL-MCNC: 0.63 MG/DL — SIGNIFICANT CHANGE UP (ref 0.5–1.3)
EOSINOPHIL # BLD AUTO: 0.01 K/UL — SIGNIFICANT CHANGE UP (ref 0–0.5)
EOSINOPHIL NFR BLD AUTO: 0.1 % — SIGNIFICANT CHANGE UP (ref 0–6)
EOSINOPHIL NFR FLD: 0 % — SIGNIFICANT CHANGE UP (ref 0–6)
GLUCOSE SERPL-MCNC: 104 MG/DL — HIGH (ref 70–99)
HCT VFR BLD CALC: 17.6 % — CRITICAL LOW (ref 34.5–45)
HGB BLD-MCNC: 5.5 G/DL — CRITICAL LOW (ref 11.5–15.5)
HYPOCHROMIA BLD QL: SIGNIFICANT CHANGE UP
IMM GRANULOCYTES # BLD AUTO: 0.24 # — SIGNIFICANT CHANGE UP
IMM GRANULOCYTES NFR BLD AUTO: 1.4 % — SIGNIFICANT CHANGE UP (ref 0–1.5)
INR BLD: 1.8 — HIGH (ref 0.88–1.17)
LYMPHOCYTES # BLD AUTO: 1.03 K/UL — SIGNIFICANT CHANGE UP (ref 1–3.3)
LYMPHOCYTES # BLD AUTO: 5.8 % — LOW (ref 13–44)
LYMPHOCYTES NFR SPEC AUTO: 6 % — LOW (ref 13–44)
MAGNESIUM SERPL-MCNC: 1.1 MG/DL — LOW (ref 1.6–2.6)
MANUAL SMEAR VERIFICATION: SIGNIFICANT CHANGE UP
MCHC RBC-ENTMCNC: 28.1 PG — SIGNIFICANT CHANGE UP (ref 27–34)
MCHC RBC-ENTMCNC: 31.3 % — LOW (ref 32–36)
MCV RBC AUTO: 89.8 FL — SIGNIFICANT CHANGE UP (ref 80–100)
MONOCYTES # BLD AUTO: 1.37 K/UL — HIGH (ref 0–0.9)
MONOCYTES NFR BLD AUTO: 7.8 % — SIGNIFICANT CHANGE UP (ref 2–14)
MONOCYTES NFR BLD: 5 % — SIGNIFICANT CHANGE UP (ref 2–9)
NEUTROPHIL AB SER-ACNC: 83 % — HIGH (ref 43–77)
NEUTROPHILS # BLD AUTO: 14.96 K/UL — HIGH (ref 1.8–7.4)
NEUTROPHILS NFR BLD AUTO: 84.8 % — HIGH (ref 43–77)
NEUTS BAND # BLD: 6 % — SIGNIFICANT CHANGE UP (ref 0–6)
NRBC # FLD: 0 — SIGNIFICANT CHANGE UP
OVALOCYTES BLD QL SMEAR: SLIGHT — SIGNIFICANT CHANGE UP
PHOSPHATE SERPL-MCNC: 3.7 MG/DL — SIGNIFICANT CHANGE UP (ref 2.5–4.5)
PLATELET # BLD AUTO: 299 K/UL — SIGNIFICANT CHANGE UP (ref 150–400)
PLATELET COUNT - ESTIMATE: NORMAL — SIGNIFICANT CHANGE UP
PMV BLD: 10.1 FL — SIGNIFICANT CHANGE UP (ref 7–13)
POTASSIUM SERPL-MCNC: 4 MMOL/L — SIGNIFICANT CHANGE UP (ref 3.5–5.3)
POTASSIUM SERPL-SCNC: 4 MMOL/L — SIGNIFICANT CHANGE UP (ref 3.5–5.3)
PROT SERPL-MCNC: 7.4 G/DL — SIGNIFICANT CHANGE UP (ref 6–8.3)
PROTHROM AB SERPL-ACNC: 20.4 SEC — HIGH (ref 9.8–13.1)
RBC # BLD: 1.96 M/UL — LOW (ref 3.8–5.2)
RBC # FLD: 18.6 % — HIGH (ref 10.3–14.5)
RH IG SCN BLD-IMP: POSITIVE — SIGNIFICANT CHANGE UP
RH IG SCN BLD-IMP: POSITIVE — SIGNIFICANT CHANGE UP
SODIUM SERPL-SCNC: 136 MMOL/L — SIGNIFICANT CHANGE UP (ref 135–145)
TROPONIN T SERPL-MCNC: < 0.06 NG/ML — SIGNIFICANT CHANGE UP (ref 0–0.06)
WBC # BLD: 17.63 K/UL — HIGH (ref 3.8–10.5)
WBC # FLD AUTO: 17.63 K/UL — HIGH (ref 3.8–10.5)

## 2017-11-28 PROCEDURE — 70450 CT HEAD/BRAIN W/O DYE: CPT | Mod: 26

## 2017-11-28 PROCEDURE — 71010: CPT | Mod: 26

## 2017-11-28 RX ORDER — SODIUM CHLORIDE 9 MG/ML
1000 INJECTION INTRAMUSCULAR; INTRAVENOUS; SUBCUTANEOUS ONCE
Qty: 0 | Refills: 0 | Status: COMPLETED | OUTPATIENT
Start: 2017-11-28 | End: 2017-11-28

## 2017-11-28 RX ORDER — OXYCODONE AND ACETAMINOPHEN 5; 325 MG/1; MG/1
2 TABLET ORAL ONCE
Qty: 0 | Refills: 0 | Status: DISCONTINUED | OUTPATIENT
Start: 2017-11-28 | End: 2017-11-28

## 2017-11-28 RX ORDER — MORPHINE SULFATE 50 MG/1
2 CAPSULE, EXTENDED RELEASE ORAL ONCE
Qty: 0 | Refills: 0 | Status: DISCONTINUED | OUTPATIENT
Start: 2017-11-28 | End: 2017-11-28

## 2017-11-28 RX ADMIN — SODIUM CHLORIDE 1000 MILLILITER(S): 9 INJECTION INTRAMUSCULAR; INTRAVENOUS; SUBCUTANEOUS at 19:02

## 2017-11-28 RX ADMIN — OXYCODONE AND ACETAMINOPHEN 2 TABLET(S): 5; 325 TABLET ORAL at 22:24

## 2017-11-28 RX ADMIN — MORPHINE SULFATE 2 MILLIGRAM(S): 50 CAPSULE, EXTENDED RELEASE ORAL at 19:02

## 2017-11-28 RX ADMIN — OXYCODONE AND ACETAMINOPHEN 2 TABLET(S): 5; 325 TABLET ORAL at 23:04

## 2017-11-28 NOTE — ED PROVIDER NOTE - MUSCULOSKELETAL, MLM
Spine appears normal, range of motion is not limited, no muscle or joint tenderness. + distal pulses. 5/5 b/l LE.

## 2017-11-28 NOTE — ED PROVIDER NOTE - OBJECTIVE STATEMENT
72F hx metastatic endometrial ca (last chemo Nov 7 carboplantin/taxol), Afib on eliquis, HTN, asthma, c/o syncope x few seconds without confusion PTA, with diaphoresis and weakness preceding episode. As per patient, she forgot to take her metoprolol this AM. Notes decreased appetite and poor po intake. 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 As per patient, she forgot to take her metoprolol this AM. Notes decreased appetite and poor po intake. Endorses taking increased amounts of oxycodone for b/l lower extremity toe pain. No recent f/c/cp/ n/v/ palpitations/ sob/ sick contacts/diarrhea.    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 As per patient, she forgot to take her metoprolol this AM. Notes decreased appetite and poor po intake. Endorses taking increased amounts of oxycodone for b/l lower extremity toe pain. No recent f/c/cp/ n/v/ palpitations/ sob/ sick contacts/diarrhea.  Family notes increased forgetfulness in patient. Normally ambulates with walker, no recent extended travel/ period of being sedentary.    PCP: Dr Rosita Denney: Dr Ramos

## 2017-11-28 NOTE — ED ADULT NURSE NOTE - OBJECTIVE STATEMENT
Patient received into room 18 AA&Ox3 s/p syncopal episode today that lasted 'a few seconds' per patients' daughter with no head trauma. Patients' daughter states pt. was sweaty and weak prior to syncopal episode. Family states that patient has not been sleeping well, taking percocet for neuropathy to b/l feet, and has decreased appetite for the past few weeks. VSS on RA. NSR upon scope of monitor. 22g PIV in place to right hand, labs drawn, medications/1L NS bolus administered per orders - will continue to monitor.

## 2017-11-28 NOTE — ED PROVIDER NOTE - MEDICAL DECISION MAKING DETAILS
72F with reduced PO intake and increased narcotic intake, risk factors for dizziness and syncope- has afib and did not take medication this  morning- risk of arrhythmia induced syncope, stage IV endometrial ca and TIA 6/2017 with report of forgetfulness by family- risk of intracranial mets, will rule out ACS and intracranial lesion with CT head. 72F with reduced PO intake and increased narcotic intake, risk factors for dizziness and syncope- has afib and did not take medication this  morning- risk of arrhythmia induced syncope, stage IV endometrial ca and TIA 6/2017 with report of forgetfulness by family- risk of intracranial mets, will rule out ACS and intracranial lesion with CT head, check labs for anemia/electrolyte disturbances

## 2017-11-28 NOTE — ED ADULT TRIAGE NOTE - CHIEF COMPLAINT QUOTE
Pt brought in for syncope, per daughter was helping her walk with walker and she became diaphoretic and leaned back. pt arrives awake co decrease appetite  and weakness, pt is currently being treated for Stage 4 Endometrial cancer last chemo Nov 7th.

## 2017-11-28 NOTE — ED PROVIDER NOTE - PROGRESS NOTE DETAILS
JAIR oliveira: pt doing well, HGb 5.5 will guiac and order 1 U PRBC. PT admitted to hospitalist on tele. JAIR oliveira: pt doing well, HGb 5.5 will guiac and order 1 U PRBC. Consent placed in chart. Pt refusing guiac in ED.  PT admitted to hospitalist on tele.

## 2017-11-28 NOTE — ED ADULT NURSE REASSESSMENT NOTE - NS ED NURSE REASSESS COMMENT FT1
U/S guided IV placed to the R a/c #20 gauge. type and screen obtained and sent pending results, will cont to monitor.

## 2017-11-28 NOTE — ED PROVIDER NOTE - NEUROLOGICAL, MLM
No gross neurologic deficits, CN II-XII intact, no facial asymmetry, no dysarthria, dysdiadochokinesia, strength equal in all four extremities, unable to assess gait as pt is complaining of severe toe pain b/l

## 2017-11-28 NOTE — ED PROVIDER NOTE - PMH
Asthma    Endometrial cancer    HTN (hypertension) Asthma    Atrial fibrillation, unspecified type    Endometrial cancer    HTN (hypertension)

## 2017-11-28 NOTE — ED PROVIDER NOTE - EYES, MLM
Clear bilaterally, pupils equal, round and reactive to light. Horizontal end gaze nystagmus, no conjunctival pallor, EOMI

## 2017-11-28 NOTE — ED PROVIDER NOTE - ATTENDING CONTRIBUTION TO CARE
Pt was seen and evaluated by me. Pt states having metastatic endometrial CA on chemo with neuropathy to b/l LE after chemo. Pt states she has been taking increased number of oxycodone for pain and is also on Gabapentin. Pt states she was using her walker today and felt b/l LE pain and weakness and sat down. Family member notes pt's head went back and she passed out for a few seconds. Pt denies any headache, neck pain, back pain, fever, chills, SOB, chest pain, or abd pain. Lungs CTA b/l. Irregularly irregular. Abd soft, non-tender. + distal pulses. No LE swelling or calf tenderness.

## 2017-11-29 ENCOUNTER — TRANSCRIPTION ENCOUNTER (OUTPATIENT)
Age: 72
End: 2017-11-29

## 2017-11-29 DIAGNOSIS — R41.89 OTHER SYMPTOMS AND SIGNS INVOLVING COGNITIVE FUNCTIONS AND AWARENESS: ICD-10-CM

## 2017-11-29 DIAGNOSIS — Z29.9 ENCOUNTER FOR PROPHYLACTIC MEASURES, UNSPECIFIED: ICD-10-CM

## 2017-11-29 DIAGNOSIS — D64.9 ANEMIA, UNSPECIFIED: ICD-10-CM

## 2017-11-29 DIAGNOSIS — I48.91 UNSPECIFIED ATRIAL FIBRILLATION: ICD-10-CM

## 2017-11-29 DIAGNOSIS — C54.1 MALIGNANT NEOPLASM OF ENDOMETRIUM: ICD-10-CM

## 2017-11-29 DIAGNOSIS — D72.829 ELEVATED WHITE BLOOD CELL COUNT, UNSPECIFIED: ICD-10-CM

## 2017-11-29 DIAGNOSIS — J45.909 UNSPECIFIED ASTHMA, UNCOMPLICATED: ICD-10-CM

## 2017-11-29 LAB
ALBUMIN SERPL ELPH-MCNC: 2.9 G/DL — LOW (ref 3.3–5)
ALP SERPL-CCNC: 67 U/L — SIGNIFICANT CHANGE UP (ref 40–120)
ALT FLD-CCNC: 7 U/L — SIGNIFICANT CHANGE UP (ref 4–33)
AST SERPL-CCNC: 12 U/L — SIGNIFICANT CHANGE UP (ref 4–32)
BILIRUB SERPL-MCNC: 2.3 MG/DL — HIGH (ref 0.2–1.2)
BUN SERPL-MCNC: 12 MG/DL — SIGNIFICANT CHANGE UP (ref 7–23)
CALCIUM SERPL-MCNC: 8.6 MG/DL — SIGNIFICANT CHANGE UP (ref 8.4–10.5)
CHLORIDE SERPL-SCNC: 96 MMOL/L — LOW (ref 98–107)
CK MB BLD-MCNC: 1 NG/ML — SIGNIFICANT CHANGE UP (ref 1–4.7)
CK MB BLD-MCNC: SIGNIFICANT CHANGE UP (ref 0–2.5)
CK SERPL-CCNC: 62 U/L — SIGNIFICANT CHANGE UP (ref 25–170)
CO2 SERPL-SCNC: 26 MMOL/L — SIGNIFICANT CHANGE UP (ref 22–31)
CREAT SERPL-MCNC: 0.55 MG/DL — SIGNIFICANT CHANGE UP (ref 0.5–1.3)
GLUCOSE SERPL-MCNC: 115 MG/DL — HIGH (ref 70–99)
HCT VFR BLD CALC: 27.7 % — LOW (ref 34.5–45)
HCT VFR BLD CALC: 29.3 % — LOW (ref 34.5–45)
HGB BLD-MCNC: 9.1 G/DL — LOW (ref 11.5–15.5)
HGB BLD-MCNC: 9.9 G/DL — LOW (ref 11.5–15.5)
INR BLD: 1.54 — HIGH (ref 0.88–1.17)
MAGNESIUM SERPL-MCNC: 1.1 MG/DL — LOW (ref 1.6–2.6)
MAGNESIUM SERPL-MCNC: 1.9 MG/DL — SIGNIFICANT CHANGE UP (ref 1.6–2.6)
MCHC RBC-ENTMCNC: 28.4 PG — SIGNIFICANT CHANGE UP (ref 27–34)
MCHC RBC-ENTMCNC: 29.6 PG — SIGNIFICANT CHANGE UP (ref 27–34)
MCHC RBC-ENTMCNC: 32.9 % — SIGNIFICANT CHANGE UP (ref 32–36)
MCHC RBC-ENTMCNC: 33.8 % — SIGNIFICANT CHANGE UP (ref 32–36)
MCV RBC AUTO: 86.6 FL — SIGNIFICANT CHANGE UP (ref 80–100)
MCV RBC AUTO: 87.7 FL — SIGNIFICANT CHANGE UP (ref 80–100)
NRBC # FLD: 0 — SIGNIFICANT CHANGE UP
NRBC # FLD: 0 — SIGNIFICANT CHANGE UP
PHOSPHATE SERPL-MCNC: 2.2 MG/DL — LOW (ref 2.5–4.5)
PLATELET # BLD AUTO: 275 K/UL — SIGNIFICANT CHANGE UP (ref 150–400)
PLATELET # BLD AUTO: 294 K/UL — SIGNIFICANT CHANGE UP (ref 150–400)
PMV BLD: 9.4 FL — SIGNIFICANT CHANGE UP (ref 7–13)
PMV BLD: 9.8 FL — SIGNIFICANT CHANGE UP (ref 7–13)
POTASSIUM SERPL-MCNC: 3.8 MMOL/L — SIGNIFICANT CHANGE UP (ref 3.5–5.3)
POTASSIUM SERPL-SCNC: 3.8 MMOL/L — SIGNIFICANT CHANGE UP (ref 3.5–5.3)
PROT SERPL-MCNC: 7.6 G/DL — SIGNIFICANT CHANGE UP (ref 6–8.3)
PROTHROM AB SERPL-ACNC: 17.4 SEC — HIGH (ref 9.8–13.1)
RBC # BLD: 3.2 M/UL — LOW (ref 3.8–5.2)
RBC # BLD: 3.34 M/UL — LOW (ref 3.8–5.2)
RBC # FLD: 16.3 % — HIGH (ref 10.3–14.5)
RBC # FLD: 16.7 % — HIGH (ref 10.3–14.5)
SODIUM SERPL-SCNC: 137 MMOL/L — SIGNIFICANT CHANGE UP (ref 135–145)
TROPONIN T SERPL-MCNC: < 0.06 NG/ML — SIGNIFICANT CHANGE UP (ref 0–0.06)
TSH SERPL-MCNC: 2.12 UIU/ML — SIGNIFICANT CHANGE UP (ref 0.27–4.2)
WBC # BLD: 18.51 K/UL — HIGH (ref 3.8–10.5)
WBC # BLD: 22.29 K/UL — HIGH (ref 3.8–10.5)
WBC # FLD AUTO: 18.51 K/UL — HIGH (ref 3.8–10.5)
WBC # FLD AUTO: 22.29 K/UL — HIGH (ref 3.8–10.5)

## 2017-11-29 PROCEDURE — 99223 1ST HOSP IP/OBS HIGH 75: CPT | Mod: GC

## 2017-11-29 PROCEDURE — 12345: CPT | Mod: NC

## 2017-11-29 RX ORDER — SODIUM,POTASSIUM PHOSPHATES 278-250MG
1 POWDER IN PACKET (EA) ORAL
Qty: 0 | Refills: 0 | Status: COMPLETED | OUTPATIENT
Start: 2017-11-29 | End: 2017-12-01

## 2017-11-29 RX ORDER — MAGNESIUM OXIDE 400 MG ORAL TABLET 241.3 MG
400 TABLET ORAL
Qty: 0 | Refills: 0 | Status: COMPLETED | OUTPATIENT
Start: 2017-11-29 | End: 2017-12-01

## 2017-11-29 RX ORDER — MAGNESIUM OXIDE 400 MG ORAL TABLET 241.3 MG
400 TABLET ORAL DAILY
Qty: 0 | Refills: 0 | Status: DISCONTINUED | OUTPATIENT
Start: 2017-11-29 | End: 2017-11-29

## 2017-11-29 RX ORDER — GABAPENTIN 400 MG/1
2 CAPSULE ORAL
Qty: 0 | Refills: 0 | COMMUNITY

## 2017-11-29 RX ORDER — MAGNESIUM SULFATE 500 MG/ML
2 VIAL (ML) INJECTION ONCE
Qty: 0 | Refills: 0 | Status: COMPLETED | OUTPATIENT
Start: 2017-11-29 | End: 2017-11-29

## 2017-11-29 RX ORDER — OXYCODONE HYDROCHLORIDE 5 MG/1
10 TABLET ORAL EVERY 6 HOURS
Qty: 0 | Refills: 0 | Status: DISCONTINUED | OUTPATIENT
Start: 2017-11-29 | End: 2017-12-06

## 2017-11-29 RX ORDER — SODIUM CHLORIDE 9 MG/ML
1000 INJECTION INTRAMUSCULAR; INTRAVENOUS; SUBCUTANEOUS
Qty: 0 | Refills: 0 | Status: DISCONTINUED | OUTPATIENT
Start: 2017-11-29 | End: 2017-11-30

## 2017-11-29 RX ORDER — BUDESONIDE AND FORMOTEROL FUMARATE DIHYDRATE 160; 4.5 UG/1; UG/1
2 AEROSOL RESPIRATORY (INHALATION)
Qty: 0 | Refills: 0 | Status: DISCONTINUED | OUTPATIENT
Start: 2017-11-29 | End: 2017-12-08

## 2017-11-29 RX ORDER — MONTELUKAST 4 MG/1
10 TABLET, CHEWABLE ORAL DAILY
Qty: 0 | Refills: 0 | Status: DISCONTINUED | OUTPATIENT
Start: 2017-11-29 | End: 2017-12-08

## 2017-11-29 RX ORDER — ACETAMINOPHEN 500 MG
650 TABLET ORAL ONCE
Qty: 0 | Refills: 0 | Status: COMPLETED | OUTPATIENT
Start: 2017-11-29 | End: 2017-11-29

## 2017-11-29 RX ADMIN — OXYCODONE HYDROCHLORIDE 10 MILLIGRAM(S): 5 TABLET ORAL at 17:30

## 2017-11-29 RX ADMIN — OXYCODONE HYDROCHLORIDE 10 MILLIGRAM(S): 5 TABLET ORAL at 18:00

## 2017-11-29 RX ADMIN — MONTELUKAST 10 MILLIGRAM(S): 4 TABLET, CHEWABLE ORAL at 12:46

## 2017-11-29 RX ADMIN — OXYCODONE HYDROCHLORIDE 10 MILLIGRAM(S): 5 TABLET ORAL at 11:00

## 2017-11-29 RX ADMIN — OXYCODONE HYDROCHLORIDE 10 MILLIGRAM(S): 5 TABLET ORAL at 03:55

## 2017-11-29 RX ADMIN — Medication 1 TABLET(S): at 23:00

## 2017-11-29 RX ADMIN — SODIUM CHLORIDE 100 MILLILITER(S): 9 INJECTION INTRAMUSCULAR; INTRAVENOUS; SUBCUTANEOUS at 09:46

## 2017-11-29 RX ADMIN — MAGNESIUM OXIDE 400 MG ORAL TABLET 400 MILLIGRAM(S): 241.3 TABLET ORAL at 17:30

## 2017-11-29 RX ADMIN — OXYCODONE HYDROCHLORIDE 10 MILLIGRAM(S): 5 TABLET ORAL at 03:18

## 2017-11-29 RX ADMIN — Medication 50 GRAM(S): at 12:46

## 2017-11-29 RX ADMIN — OXYCODONE HYDROCHLORIDE 10 MILLIGRAM(S): 5 TABLET ORAL at 10:12

## 2017-11-29 RX ADMIN — Medication 1 TABLET(S): at 17:30

## 2017-11-29 RX ADMIN — Medication 650 MILLIGRAM(S): at 23:00

## 2017-11-29 NOTE — H&P ADULT - PROBLEM SELECTOR PLAN 1
multifactorial- may be chemotherapy induced in addition to blood loss from hematuria. Patient is currently hemodynamically stable.   - Will complete 2U PRBCs and will check post transfusion CBC  - Will obtain UA and fecal occult to find source of blood loss   - CBC q12 if there is an adequate response to transfusion   - Telemetry monitor  - fall precautions multifactorial- may be chemotherapy induced in addition to blood loss from hematuria. Patient is currently hemodynamically stable.   - Will complete 2U PRBCs and will check post transfusion CBC  - Will obtain UA and fecal occult to find source of blood loss   - CBC q12 if there is an adequate response to transfusion   - Telemetry monitor  - fall precautions  - consider iron supplementation if necessary

## 2017-11-29 NOTE — H&P ADULT - PROBLEM SELECTOR PLAN 4
- Continue Symbicort as needed  - Continue Singulair daily [id number] Score 5  - Will hold off on eliquis in setting of anemia   - Will hold rate control with metoprolol in setting of anemia, do not want to mask reflex tachycardia from hypotension [id number] Score 5  - No issues presently  - Will hold off on eliquis in setting of anemia   - Will hold rate control with metoprolol in setting of anemia, do not want to mask reflex tachycardia from hypotension  - Suggest restarting metoprolol when anemia improves

## 2017-11-29 NOTE — DISCHARGE NOTE ADULT - CARE PLAN
Principal Discharge DX:	Symptomatic anemia  Goal:	Optimal H/H levels  Instructions for follow-up, activity and diet:	- multifactorial or chemotherapy induced in addition to blood loss  - UA- neg for blood, s/p 2 units of blood  - vaginal bleed: resolved, FOB- negative  - s/p 2U PRBCs, monitor cbc, repeat H/H stable  - Eliquis for afib was held, resumed on 12/8/17. Continue taking as prescribed  - ; Haptoglobin 426 elevated; Vit B12: 975 elevated  Secondary Diagnosis:	Sepsis, due to unspecified organism  Instructions for follow-up, activity and diet:	- Pt spiking fever, with leukocytosis, and HR > 97 with fever, meet sepsis criteria.   - possible due to sigmoid colon abscess/contained perforation on CT   - ID consulted Dr. Choi , Surgery and IR following   - Blood and urine Cx: NTD, RVP neg   - continued Zosyn in the hospital, discharged home on Augmentin to complete 7 more days     - 12/2 s/p IR percutaneous drainage catheter placement. You have appoint with IR on 12/14/17 at 11 am for tube check.  Secondary Diagnosis:	Syncope, unspecified syncope type  Goal:	Near Syncope  Instructions for follow-up, activity and diet:	- was monitored on Tele, Doron -negative  Secondary Diagnosis:	Perforation and abscess of large intestine concurrent with and due to diverticulitis  Instructions for follow-up, activity and diet:	- Pt spiking fever, with leukocytosis, and HR > 97 with fever, meet sepsis criteria.   - possible due to sigmoid colon abscess/contained perforation on CT   - ID consulted Dr. Choi , Surgery and IR following   - Blood and urine Cx: NTD, RVP neg   - US of abdomen --> gallstones & sludge  - CT chest, abd/pelvis - revealed mural thickening of the sigmoid colon with adjacent air and fluid collection, consistent with contained perforation.   - continued Zosyn in the hospital, discharged home on Augmentin to complete 7 more days     - 12/2 s/p IR percutaneous drainage catheter placement. You have appoint with IR on 12/14/17 at 11 am for tube check.  Secondary Diagnosis:	Endometrial adenocarcinoma  Instructions for follow-up, activity and diet:	- on Carboplatin and Taxol  - Heme/Onc consulted  - patient is considering holding off on further chemotherapy given the side effects she has had which is completely reasonable  - outpatient follow-up with Dr. Salcedo  Secondary Diagnosis:	Atrial fibrillation, unspecified type  Instructions for follow-up, activity and diet:	- Continue taking Eliquis and Metoprolol as prescribed  - Follow up with PCP and Card as outpatient for further management

## 2017-11-29 NOTE — H&P ADULT - NSHPREVIEWOFSYSTEMS_GEN_ALL_CORE
REVIEW OF SYSTEMS:  CONSTITUTIONAL: fatigue  EYES:  visual disturbances, or discharge  ENMT:   No sinus or throat pain  RESPIRATORY: No cough, wheezing, chills or hemoptysis; No shortness of breath  CARDIOVASCULAR: No chest pain, palpitations, or leg swelling  GASTROINTESTINAL: No abdominal or epigastric pain. No nausea, vomiting, or hematemesis; No melena or hematochezia.  GENITOURINARY: No dysuria, frequency  NEUROLOGICAL: No headaches  MUSCULOSKELETAL: bilateral neuropathy pain   HEME/LYMPH: No easy bruising, or bleeding gums

## 2017-11-29 NOTE — DISCHARGE NOTE ADULT - CARE PROVIDERS DIRECT ADDRESSES
,[email or web address],DirectAddress_Unknown,DirectAddress_Unknown,[email or web address]

## 2017-11-29 NOTE — H&P ADULT - ASSESSMENT
72 F with hx of asthma, atrial fibrillation on Eliquis, endometrial carcinoma (on Carboplatin and Taxol) presenting symptomatic anemia- multifactorial 2/2 chemo and hematuria

## 2017-11-29 NOTE — DISCHARGE NOTE ADULT - PATIENT PORTAL LINK FT
“You can access the Follow[facility] Patient Portal, offered by [facility], by registering with the following website: [email or web address]”

## 2017-11-29 NOTE — DISCHARGE NOTE ADULT - ADDITIONAL INSTRUCTIONS
Follow up with IR, you have appoint with IR on 12/14/17 at 11 am for tube check.  Follow up with PCP and Card as outpatient for further management

## 2017-11-29 NOTE — CONSULT NOTE ADULT - SUBJECTIVE AND OBJECTIVE BOX
HISTORY OF PRESENT ILLNESS:    72 F with hx of asthma, atrial fibrillation on Eliquis, endometrial carcinoma (on Carboplatin and Taxol) presenting with one day history of fatigue.Patient reports she was feeling hungry and needed to sit down because she felt like she would faint. After sitting down and eating something, patient felt better. Denied any headaches, visual changes, chest pain, SOB prior to episode.No melena or hematochezia. However, patient reports visualizing blood in urine since november 7th. The blood is not present in the urine itself, but when she wipes after urination, the blood is present on the napkin. Denies nose bleeds or any other bleeding site. Patient has required several blood transfusions since starting chemo this summer. Of note, patient had symptoms concerning for TIA in [facility] over the summer. Patient's daughter and granddaughter have noticed the patient was forgetful over the week- confusing the two family members for each other.  Currently she is forgetful, denies pain or SOB, just reports feeling tired.    TTE in my office 12/2016 with NORMAL LV/RV function  Plain TST in my office 3/2016 with No Symptoms or EKG changes    PAST MEDICAL & SURGICAL HISTORY:  Atrial fibrillation, unspecified type  Endometrial cancer  HTN (hypertension)  Asthma  No significant past surgical history      FAMILY HISTORY:  Family history of cystic fibrosis (Uncle)  Family history of amyotrophic lateral sclerosis (Sibling)      SOCIAL HISTORY:    (x ) Non-smoker ( ) Smoker ( ) Alcohol Abuse ( ) IVDA    Allergies  No Known Allergies    MEDICATIONS  (STANDING):  magnesium oxide 400 milliGRAM(s) Oral three times a day with meals  montelukast 10 milliGRAM(s) Oral daily  potassium acid phosphate/sodium acid phosphate tablet (K-PHOS No. 2) 1 Tablet(s) Oral four times a day with meals  sodium chloride 0.9%. 1000 milliLiter(s) (100 mL/Hr) IV Continuous <Continuous>    MEDICATIONS  (PRN):  buDESOnide 160 MICROgram(s)/formoterol 4.5 MICROgram(s) Inhaler 2 Puff(s) Inhalation two times a day PRN shortness of breath  oxyCODONE    IR 10 milliGRAM(s) Oral every 6 hours PRN Severe Pain (7 - 10)      REVIEW OF SYSTEMS:     Limited given patients confusion.  Denies Pain, SOB, Dizziness.  Rest of information obtained from electronic Chart.    PHYSICAL EXAM:  Vital Signs Last 24 Hrs  T(C): 37.2 (29 Nov 2017 15:12), Max: 37.2 (29 Nov 2017 15:12)  T(F): 98.9 (29 Nov 2017 15:12), Max: 98.9 (29 Nov 2017 15:12)  HR: 87 (29 Nov 2017 15:12) (75 - 97)  BP: 128/64 (29 Nov 2017 15:12) (105/50 - 143/68)  RR: 19 (29 Nov 2017 15:12) (16 - 20)  SpO2: 100% (29 Nov 2017 15:12) (96% - 100%)  	  Cardiovascular:  S1 S2 RRR, No JVD, No murmurs  Respiratory: Lungs CTA B/L, No wheeze, rales, or rhonchi	  Gastrointestinal:  Soft, Non-tender, + BS	  Skin: No rashes, No ecchymoses	  Extremities: No clubbing, cyanosis or edema  Vascular: Peripheral pulses palpable 2+ bilaterally    DATA:    TELEMETRY: 	  SR    ECG:  NSR	    RADIOLOGY:  < from: CT Head No Cont (11.28.17 @ 19:35) >  IMPRESSION:   No CT evidence of acute intracranial hemorrhage, brain edema, or mass   effect. No displaced calvarial fracture.      AGUSTIN CONROY M.D., RADIOLOGY RESIDENT  This document has been electronically signed.  JIMBO BATISTA M.D., ATTENDING RADIOLOGIST  This document has been electronically signed. Nov 28 2017  8:07PM    < end of copied text >  		  LABS:	 	    CARDIAC MARKERS ( 29 Nov 2017 10:51 )  x     / < 0.06 ng/mL / 62 u/L / 1.00 ng/mL / x      CARDIAC MARKERS ( 28 Nov 2017 18:05 )  x     / < 0.06 ng/mL / 65 u/L / 1.00 ng/mL / x                            9.9    18.51 )-----------( 294      ( 29 Nov 2017 10:51 )             29.3    137  |  96<L>  |  12  ----------------------------<  115<H>  3.8   |  26  |  0.55    Ca    8.6      29 Nov 2017 10:51  Phos  2.2     11-29  Mg     1.1     11-29  TPro  7.6  /  Alb  2.9<L>  /  TBili  2.3<H>  /  DBili  x   /  AST  12  /  ALT  7   /  AlkPhos  67  11-29  PT/INR - ( 29 Nov 2017 07:00 )   PT: 17.4 SEC;   INR: 1.54    PTT - ( 28 Nov 2017 22:00 )  PTT:29.0 SEC  TSH: Thyroid Stimulating Hormone, Serum: 2.12 uIU/mL (11-29 @ 10:51)      ASSESSMENT/PLAN: 	  72 F with hx of asthma, atrial fibrillation on Eliquis, endometrial carcinoma (on Carboplatin and Taxol) presenting with one day history of fatigue.  Found to be markedly anemic, s/p 2 units PRBCs transfused.  Family reports increased confusion recently.  TTE in my office 12/2016 with NORMAL LV/RV function  Plain TST in my office 3/2016 with No Symptoms or EKG changes    --Eliquis on hold given profound anemia requiring transfusion]  --Metoptolol on hold.  BP/HE currently stable  --CT head noted.  Work up of confusion per primary team  --TTE last year noted above.  Currently not in clinical CHF    will D/W attending.      Lorena Bianchi PA-C  [facility]  [address], [address]   [place] [address]  office [phone]  pager [phone]

## 2017-11-29 NOTE — H&P ADULT - PROBLEM SELECTOR PLAN 2
[id number] Score 5  - Will hold off on eliquis in setting of anemia   - Will hold rate control with metoprolol in setting of anemia, do not want to mask reflex tachycardia from hypotension may be secondary to underlying infectious process  - Will monitor off antibiotics for now   - UA, CXR final read pending   - If patient becomes febrile, will obtain blood cultures as patient is immunocompromised

## 2017-11-29 NOTE — DISCHARGE NOTE ADULT - SECONDARY DIAGNOSIS.
Sepsis, due to unspecified organism Syncope, unspecified syncope type Perforation and abscess of large intestine concurrent with and due to diverticulitis Endometrial adenocarcinoma Atrial fibrillation, unspecified type

## 2017-11-29 NOTE — PHYSICAL THERAPY INITIAL EVALUATION ADULT - DIAGNOSIS, PT EVAL
Symptomatic Anemia secondary to chemo and Hematuria; Hypertensive Urgency Symptomatic Anemia secondary to chemo and Hematuria

## 2017-11-29 NOTE — H&P ADULT - PROBLEM SELECTOR PLAN 5
DVT pxx: SCIDS  Diet: Regular   Dispo: PT consulted (on Carboplatin and Taxol)  - Will continue neuropathy pain management with home oxycodone 10mg q6hr (ISTOP Reference #: [id number])  - Will consult house Heme/Onc in AM, was due to chemo today but will hold off for now

## 2017-11-29 NOTE — DISCHARGE NOTE ADULT - CARE PROVIDER_API CALL
Umberto Holman), Diagnostic Radiology; [facility]  [address] [address] [place] [address]  Phone: [phone]  Fax: [phone]    Jesús Choi), Internal Medicine  [address] [place] [address]  Phone: [phone]  Fax: [phone]    Markus Harris), Cardiovascular Disease  [address] [facility] [address]  [place] [address]  Phone: [phone]  Fax: [phone]    Heather Salcedo), Hematology; Internal Medicine; Medical Oncology  [address] [place] [address]  Phone: [phone]  Fax: [phone]

## 2017-11-29 NOTE — DISCHARGE NOTE ADULT - HOME CARE AGENCY
[facility] [phone] Nurse to visit on the day following discharge. Other appropriate services to be arranged thereafter.

## 2017-11-29 NOTE — H&P ADULT - HISTORY OF PRESENT ILLNESS
72 F with hx of asthma, atrial fibrillation on Eliquis, endometrial carcinoma (on Carboplatin and Taxol) presenting with one day history of fatigue. Earlier this afternoon, patient was getting ready for her chemo/ heme-onc appointment today, when she had an episode of lightheadedness and dizziness. Patient reports she was feeling hungry and needed to sit down because she felt like she would faint. After sitting down and eating something, patient felt better. Denied any headaches, visual changes, chest pain, SOB prior to episode. Patient reports taking eliquis 5BID for atrial fibrillation. No melena or hematochezia. However, patient reports visualizing blood in urine since november 7th. The blood is not present in the urine itself, but when she wipes after urination, the blood is present on the napkin. Denies nose bleeds or any other bleeding site. Patient has required several blood transfusions since starting chemo this summer.     Of note, patient had symptoms concerning for TIA in [facility] over the summer. Patient's daughter and granddaughter have noticed the patient was forgetful over the week- confusing the two family members for each other. Patient denies being confused.     In the ED, T 98.2 HR 89 /50 RR 16 SpO2 100. Patient was found to have hemoglobin of 5.5. 1U PRBC was given. For neuropathy pain, patient was given morphine 2 mg and percocet.

## 2017-11-29 NOTE — H&P ADULT - NSHPPHYSICALEXAM_GEN_ALL_CORE
PHYSICAL EXAM:  GENERAL: NAD, well-groomed, well-developed  HEAD:  Atraumatic, Normocephalic  EYES: EOMI, PERRLA, conjunctiva and sclera clear  ENMT: No tonsillar erythema, exudates, or enlargement; dry mucous membranes  CHEST/LUNG: Clear to percussion bilaterally; No rales, rhonchi, wheezing, or rubs  HEART: Regular rate and rhythm; No murmurs, rubs, or gallops  ABDOMEN: Soft, Nontender, Nondistended; Bowel sounds present  EXTREMITIES:  2+ Peripheral Pulses, No clubbing, cyanosis, or edema  SKIN: No rashes or lesions  NERVOUS SYSTEM:  Alert & Oriented X3, Good concentration; Motor Strength equal bilaterally

## 2017-11-29 NOTE — CHART NOTE - NSCHARTNOTEFT_GEN_A_CORE
Patient was evaluated and admitted by my colleague this morning.  I personally saw and examined Ms. Butler around 10am on 11/29/17 and I reviewed admission H&P, labs, orders, and consultant notes.    Ms. Butler is a 71 yo F with PMH of asthma, A.fib on Eliquis, endometrial carcinoma (on Carboplatin and Taxol) who p/w fatigue, PADILLA, and near syncope in the setting of symptomatic anema, likely multifactorial - possibly 2/2 chemo, reported slight hematuria.  Also noted to be confused per family and has low grade fever with leukocytosis.  1.  Acute anemia - chemo induced vs. acute blood loss 2/2 hematuria  -no reports of hematochezia/melena/epistaxis  -received 2 units PRBC for Hgb 5.5 with inappropriate rise in Hgb to 9.9   -monitoring CBC  -oncology f/u  2.  Confusion - r/o infection  -checking UA, urine cx, blood cx  -CXR/CTH unremarkable  3.  A.fib   -eliquis on hold in setting of hematuria  -trend CBC Patient was evaluated and admitted by my colleague this morning.  I personally saw and examined Ms. Butler around 10am on 11/29/17 and I reviewed admission H&P, labs, orders, and consultant notes.    Ms. Butler is a 71 yo F with PMH of asthma, A.fib on Eliquis, endometrial carcinoma (on Carboplatin and Taxol) who p/w fatigue, PADILLA, and near syncope in the setting of symptomatic anema, likely multifactorial - possibly 2/2 chemo, reported slight hematuria.  Also noted to be confused per family and has low grade fever with leukocytosis.  1.  Acute anemia - chemo induced vs. acute blood loss 2/2 hematuria  -no reports of hematochezia/melena/epistaxis  -received 2 units PRBC for Hgb 5.5 with inappropriate rise in Hgb to 9.9   -monitoring CBC  -oncology f/u  2.  Confusion - possibly metabolic encephalopathy, r/o infection  -checking UA, urine cx, blood cx  -CT a/p from 1 month ago with mention of bladder wall thickening possibly cystitis - not treated with abx at that time.  It's possible that hematuria, leukocytosis, fever are manifestations of sepsis 2/2 UTI - spoke with ADS - IV CTX to be started after UA, urine/blood cultures sent  -CXR/CTH unremarkable  3.  A.fib   -eliquis on hold in setting of hematuria  -trend CBC

## 2017-11-29 NOTE — H&P ADULT - PROBLEM SELECTOR PLAN 3
(on Carboplatin and Taxol)  - Will continue neuropathy pain management with home oxycodone 10mg q6hr (ISTOP Reference #: [id number])  - Will consult house Heme/Onc in AM, was due to chemo today but will hold off for now as per family, patient has been more forgetful this week which can be due to symptomatic anemia vs infectious process. Patient is currently AAOx4  - Will rule out infection as mentioned above  - Fall and aspiration precautions.   - PT consulted as per family, patient has been more forgetful this week which can be due to symptomatic anemia vs infectious process. Patient is currently AAOx4  - Will rule out infection as mentioned above  - Fall and aspiration precautions.   - PT consulted  - F/U TSH level and Vit D levels

## 2017-11-29 NOTE — H&P ADULT - PROBLEM SELECTOR PLAN 6
- Continue Symbicort as needed  - Continue Singulair daily - No issues presently  - Continue Symbicort as needed  - Continue Singulair daily

## 2017-11-29 NOTE — DISCHARGE NOTE ADULT - HOSPITAL COURSE
HPI:  72 F with hx of asthma, atrial fibrillation on Eliquis, endometrial carcinoma (on Carboplatin and Taxol) presenting with one day history of fatigue. Earlier this afternoon, patient was getting ready for her chemo/ heme-onc appointment today, when she had an episode of lightheadedness and dizziness. Patient reports she was feeling hungry and needed to sit down because she felt like she would faint. After sitting down and eating something, patient felt better. Denied any headaches, visual changes, chest pain, SOB prior to episode. Patient reports taking eliquis 5BID for atrial fibrillation. No melena or hematochezia. However, patient reports visualizing blood in urine since november 7th. The blood is not present in the urine itself, but when she wipes after urination, the blood is present on the napkin. Denies nose bleeds or any other bleeding site. Patient has required several blood transfusions since starting chemo this summer.     Of note, patient had symptoms concerning for TIA in [facility] over the summer. Patient's daughter and granddaughter have noticed the patient was forgetful over the week- confusing the two family members for each other. Patient denies being confused.     In the ED, T 98.2 HR 89 /50 RR 16 SpO2 100. Patient was found to have hemoglobin of 5.5. 1U PRBC was given. For neuropathy pain, patient was given morphine 2 mg and percocet. (29 Nov 2017 00:19)      HOSPITAL COURSE:  Vital Signs Last 24 Hrs  T(C): 37.2 (29 Nov 2017 15:12), Max: 37.2 (29 Nov 2017 15:12)  T(F): 98.9 (29 Nov 2017 15:12), Max: 98.9 (29 Nov 2017 15:12)  HR: 87 (29 Nov 2017 15:12) (75 - 97)  BP: 128/64 (29 Nov 2017 15:12) (105/50 - 143/68)  BP(mean): --  RR: 19 (29 Nov 2017 15:12) (16 - 20)  SpO2: 100% (29 Nov 2017 15:12) (96% - 100%)    SIGNIFICANT FINDINGS:    H./H: 5.5/17.2-->s/p 2 units PRBC-->9.9/29.3  CXR: prelim:  INTERPRETATION:  Clear lungs. Prominent skin fold over the left lower lung  CEx 1 neg  Mg+ 1.1-->supplementing with IV and PO Mag HPI:  72 F with hx of asthma, atrial fibrillation on Eliquis, endometrial carcinoma (on Carboplatin and Taxol) presenting with one day history of fatigue. Earlier this afternoon, patient was getting ready for her chemo/ heme-onc appointment today, when she had an episode of lightheadedness and dizziness. Patient reports she was feeling hungry and needed to sit down because she felt like she would faint. After sitting down and eating something, patient felt better. Denied any headaches, visual changes, chest pain, SOB prior to episode. Patient reports taking eliquis 5BID for atrial fibrillation. No melena or hematochezia. However, patient reports visualizing blood in urine since november 7th. The blood is not present in the urine itself, but when she wipes after urination, the blood is present on the napkin. Denies nose bleeds or any other bleeding site. Patient has required several blood transfusions since starting chemo this summer.     Of note, patient had symptoms concerning for TIA in [facility] over the summer. Patient's daughter and granddaughter have noticed the patient was forgetful over the week- confusing the two family members for each other. Patient denies being confused.     In the ED, T 98.2 HR 89 /50 RR 16 SpO2 100. Patient was found to have hemoglobin of 5.5. 1U PRBC was given. For neuropathy pain, patient was given morphine 2 mg and percocet. (29 Nov 2017 00:19)    HOSPITAL COURSE    72 F PMH asthma, atrial fibrillation on Eliquis, stage 4 endometrial carcinoma (on Carboplatin and Taxol) presenting symptomatic anemia- per grand dgtr pt syncopized. c/b sepsis found sigmoid colon mural fluid collection possible contained perforation s/p IR drainage 12/2/17 with percutaneous drainage catheter.  D/c 40 minutes.   72 F PMH asthma, atrial fibrillation on Eliquis, stage 4 endometrial carcinoma (on Carboplatin and Taxol) presenting symptomatic anemia- per grand dgtr pt syncopized. c/b sepsis found sigmoid colon mural fluid collection possible contained perforation s/p IR drainage 12/2/17 with percutaneous drainage catheter.        Symptomatic anemia- multifactorial- may be chemotherapy induced in addition to blood loss from vagina  no more vaginal bleeding. Guaiac negative. s/p 2U PRBC, monitoring cbc. H/H stable     Sepsis, due to unspecified organism- Pt was spiking fever, with leukocytosis, and HR > 97 with fever, meet sepsis criteria. sepsis resolved. Possibly due to sigmoid colon abscess/contained perforation on CT, c/w  zosyn, s/p IR percutaneous drainage catheter. Blood Cx/Urine Cx negative. Fluid Cx grew pansensitive streptococcus constellatus and bacteroides.     Perforation and abscess of large intestine concurrent with and due to diverticulitis.- s/p IR drainage 12/2/17,  Tolerated oral diet. Surgery and IR changed wound dressing. Treated with zosyn changed to Augmentin on d/c x7 more days. Leukocytosis resolved, fever resolved. Pain management.     Atrial fibrillation, unspecified type. [id number] Score 5 - HR controlled. Held Eliquis in setting of anemia/procedure, for risk of bleeding. Restarted Metoprolol when anemia improved HR controlled.     Endometrial cancer- (on Carboplatin and Taxol). Will continue neuropathy pain management with home oxycodone 10mg q6hr prn. Increased Neurontin to 600mg bid.     Asthma- Continue Symbicort as needed. Continue Singulair daily.    Dispo: PT for home.

## 2017-11-29 NOTE — PHYSICAL THERAPY INITIAL EVALUATION ADULT - ADDITIONAL COMMENTS
Unable to attain complete Social History and Prior Level of Function as patient is providing verbal responses less than 50% of the time.

## 2017-11-29 NOTE — DISCHARGE NOTE ADULT - MEDICATION SUMMARY - MEDICATIONS TO TAKE
I will START or STAY ON the medications listed below when I get home from the hospital:    oxyCODONE 10 mg oral tablet  -- 1 tab(s) by mouth every 6 hours, As Needed MDD:4 tabs / day  -- Indication: For Endometrial adenocarcinoma    apixaban 5 mg oral tablet  -- 1 tab(s) by mouth 2 times a day  -- Indication: For Atrial fibrillation, unspecified type    gabapentin 600 mg oral tablet  -- 1 tab(s) by mouth 2 times a day   -- Indication: For Pelvic abscess in female    metoprolol succinate 25 mg oral tablet, extended release  -- 1 tab(s) by mouth once a day  -- Indication: For HTN    budesonide-formoterol 160 mcg-4.5 mcg/inh inhalation aerosol  -- 2 puff(s) inhaled 2 times a day  -- Indication: For Asthma    montelukast 10 mg oral tablet  -- 1 tab(s) by mouth once a day  -- Indication: For Asthma    magnesium oxide 400 mg (240 mg elemental magnesium) oral tablet  -- 1 tab(s) by mouth 2 times a day  -- Indication: For Need for prophylactic measure    Augmentin 875 mg-125 mg oral tablet  -- 875 milligram(s) by mouth every 12 hours   -- Finish all this medication unless otherwise directed by prescriber.  Take with food or milk.    -- Indication: For Perforation and abscess of large intestine concurrent with and due to diverticulitis    Probiotic Formula oral capsule  -- 1 cap(s) by mouth 2 times a day   -- Check with your doctor before becoming pregnant.  Take with food.    -- Indication: For Need for prophylactic measure

## 2017-11-30 LAB
24R-OH-CALCIDIOL SERPL-MCNC: 42.5 NG/ML — SIGNIFICANT CHANGE UP (ref 30–80)
ALBUMIN SERPL ELPH-MCNC: 2.6 G/DL — LOW (ref 3.3–5)
ALBUMIN SERPL ELPH-MCNC: 2.6 G/DL — LOW (ref 3.3–5)
ALP SERPL-CCNC: 56 U/L — SIGNIFICANT CHANGE UP (ref 40–120)
ALP SERPL-CCNC: 56 U/L — SIGNIFICANT CHANGE UP (ref 40–120)
ALT FLD-CCNC: 13 U/L — SIGNIFICANT CHANGE UP (ref 4–33)
ALT FLD-CCNC: 13 U/L — SIGNIFICANT CHANGE UP (ref 4–33)
APPEARANCE UR: CLEAR — SIGNIFICANT CHANGE UP
AST SERPL-CCNC: 18 U/L — SIGNIFICANT CHANGE UP (ref 4–32)
AST SERPL-CCNC: 18 U/L — SIGNIFICANT CHANGE UP (ref 4–32)
BASOPHILS # BLD AUTO: 0.04 K/UL — SIGNIFICANT CHANGE UP (ref 0–0.2)
BASOPHILS NFR BLD AUTO: 0.2 % — SIGNIFICANT CHANGE UP (ref 0–2)
BILIRUB DIRECT SERPL-MCNC: 1 MG/DL — HIGH (ref 0.1–0.2)
BILIRUB SERPL-MCNC: 2.2 MG/DL — HIGH (ref 0.2–1.2)
BILIRUB SERPL-MCNC: 2.2 MG/DL — HIGH (ref 0.2–1.2)
BILIRUB UR-MCNC: NEGATIVE — SIGNIFICANT CHANGE UP
BLOOD UR QL VISUAL: NEGATIVE — SIGNIFICANT CHANGE UP
BUN SERPL-MCNC: 16 MG/DL — SIGNIFICANT CHANGE UP (ref 7–23)
BUN SERPL-MCNC: 18 MG/DL — SIGNIFICANT CHANGE UP (ref 7–23)
CALCIUM SERPL-MCNC: 8.4 MG/DL — SIGNIFICANT CHANGE UP (ref 8.4–10.5)
CALCIUM SERPL-MCNC: 8.6 MG/DL — SIGNIFICANT CHANGE UP (ref 8.4–10.5)
CHLORIDE SERPL-SCNC: 97 MMOL/L — LOW (ref 98–107)
CHLORIDE SERPL-SCNC: 98 MMOL/L — SIGNIFICANT CHANGE UP (ref 98–107)
CO2 SERPL-SCNC: 23 MMOL/L — SIGNIFICANT CHANGE UP (ref 22–31)
CO2 SERPL-SCNC: 24 MMOL/L — SIGNIFICANT CHANGE UP (ref 22–31)
COLOR SPEC: YELLOW — SIGNIFICANT CHANGE UP
CREAT SERPL-MCNC: 0.63 MG/DL — SIGNIFICANT CHANGE UP (ref 0.5–1.3)
CREAT SERPL-MCNC: 0.76 MG/DL — SIGNIFICANT CHANGE UP (ref 0.5–1.3)
EOSINOPHIL # BLD AUTO: 0 K/UL — SIGNIFICANT CHANGE UP (ref 0–0.5)
EOSINOPHIL NFR BLD AUTO: 0 % — SIGNIFICANT CHANGE UP (ref 0–6)
GGT SERPL-CCNC: 28 U/L — SIGNIFICANT CHANGE UP (ref 5–36)
GLUCOSE SERPL-MCNC: 108 MG/DL — HIGH (ref 70–99)
GLUCOSE SERPL-MCNC: 121 MG/DL — HIGH (ref 70–99)
GLUCOSE UR-MCNC: NEGATIVE — SIGNIFICANT CHANGE UP
HAPTOGLOB SERPL-MCNC: 426 MG/DL — HIGH (ref 34–200)
HCT VFR BLD CALC: 33.1 % — LOW (ref 34.5–45)
HGB BLD-MCNC: 10.5 G/DL — LOW (ref 11.5–15.5)
HYALINE CASTS # UR AUTO: HIGH (ref 0–?)
IMM GRANULOCYTES # BLD AUTO: 0.21 # — SIGNIFICANT CHANGE UP
IMM GRANULOCYTES NFR BLD AUTO: 1.2 % — SIGNIFICANT CHANGE UP (ref 0–1.5)
KETONES UR-MCNC: NEGATIVE — SIGNIFICANT CHANGE UP
LDH SERPL L TO P-CCNC: 191 U/L — SIGNIFICANT CHANGE UP (ref 135–225)
LDH SERPL L TO P-CCNC: 266 U/L — HIGH (ref 135–225)
LEUKOCYTE ESTERASE UR-ACNC: NEGATIVE — SIGNIFICANT CHANGE UP
LYMPHOCYTES # BLD AUTO: 1.09 K/UL — SIGNIFICANT CHANGE UP (ref 1–3.3)
LYMPHOCYTES # BLD AUTO: 6.3 % — LOW (ref 13–44)
MAGNESIUM SERPL-MCNC: 1.9 MG/DL — SIGNIFICANT CHANGE UP (ref 1.6–2.6)
MCHC RBC-ENTMCNC: 28.6 PG — SIGNIFICANT CHANGE UP (ref 27–34)
MCHC RBC-ENTMCNC: 31.7 % — LOW (ref 32–36)
MCV RBC AUTO: 90.2 FL — SIGNIFICANT CHANGE UP (ref 80–100)
MONOCYTES # BLD AUTO: 1.02 K/UL — HIGH (ref 0–0.9)
MONOCYTES NFR BLD AUTO: 5.9 % — SIGNIFICANT CHANGE UP (ref 2–14)
MUCOUS THREADS # UR AUTO: SIGNIFICANT CHANGE UP
NEUTROPHILS # BLD AUTO: 15 K/UL — HIGH (ref 1.8–7.4)
NEUTROPHILS NFR BLD AUTO: 86.4 % — HIGH (ref 43–77)
NITRITE UR-MCNC: NEGATIVE — SIGNIFICANT CHANGE UP
NRBC # FLD: 0 — SIGNIFICANT CHANGE UP
PH UR: 5.5 — SIGNIFICANT CHANGE UP (ref 4.6–8)
PHOSPHATE SERPL-MCNC: 3.4 MG/DL — SIGNIFICANT CHANGE UP (ref 2.5–4.5)
PLATELET # BLD AUTO: 337 K/UL — SIGNIFICANT CHANGE UP (ref 150–400)
PMV BLD: 10.4 FL — SIGNIFICANT CHANGE UP (ref 7–13)
POTASSIUM SERPL-MCNC: 3.7 MMOL/L — SIGNIFICANT CHANGE UP (ref 3.5–5.3)
POTASSIUM SERPL-MCNC: 4.2 MMOL/L — SIGNIFICANT CHANGE UP (ref 3.5–5.3)
POTASSIUM SERPL-SCNC: 3.7 MMOL/L — SIGNIFICANT CHANGE UP (ref 3.5–5.3)
POTASSIUM SERPL-SCNC: 4.2 MMOL/L — SIGNIFICANT CHANGE UP (ref 3.5–5.3)
PROT SERPL-MCNC: 7.1 G/DL — SIGNIFICANT CHANGE UP (ref 6–8.3)
PROT SERPL-MCNC: 7.1 G/DL — SIGNIFICANT CHANGE UP (ref 6–8.3)
PROT UR-MCNC: 30 — HIGH
RBC # BLD: 3.67 M/UL — LOW (ref 3.8–5.2)
RBC # FLD: 16.8 % — HIGH (ref 10.3–14.5)
RBC CASTS # UR COMP ASSIST: SIGNIFICANT CHANGE UP (ref 0–?)
SODIUM SERPL-SCNC: 135 MMOL/L — SIGNIFICANT CHANGE UP (ref 135–145)
SODIUM SERPL-SCNC: 136 MMOL/L — SIGNIFICANT CHANGE UP (ref 135–145)
SP GR SPEC: 1.02 — SIGNIFICANT CHANGE UP (ref 1–1.03)
SQUAMOUS # UR AUTO: SIGNIFICANT CHANGE UP
UROBILINOGEN FLD QL: 1 E.U. — SIGNIFICANT CHANGE UP (ref 0.1–0.2)
WBC # BLD: 17.36 K/UL — HIGH (ref 3.8–10.5)
WBC # FLD AUTO: 17.36 K/UL — HIGH (ref 3.8–10.5)
WBC UR QL: SIGNIFICANT CHANGE UP (ref 0–?)
YEAST BUDDING # UR COMP ASSIST: SIGNIFICANT CHANGE UP

## 2017-11-30 PROCEDURE — 76700 US EXAM ABDOM COMPLETE: CPT | Mod: 26

## 2017-11-30 PROCEDURE — 99233 SBSQ HOSP IP/OBS HIGH 50: CPT

## 2017-11-30 PROCEDURE — 99222 1ST HOSP IP/OBS MODERATE 55: CPT | Mod: GC

## 2017-11-30 RX ORDER — CEFTRIAXONE 500 MG/1
INJECTION, POWDER, FOR SOLUTION INTRAMUSCULAR; INTRAVENOUS
Qty: 0 | Refills: 0 | Status: DISCONTINUED | OUTPATIENT
Start: 2017-11-30 | End: 2017-11-30

## 2017-11-30 RX ORDER — GABAPENTIN 400 MG/1
300 CAPSULE ORAL
Qty: 0 | Refills: 0 | Status: DISCONTINUED | OUTPATIENT
Start: 2017-11-30 | End: 2017-12-05

## 2017-11-30 RX ORDER — SODIUM CHLORIDE 9 MG/ML
1000 INJECTION INTRAMUSCULAR; INTRAVENOUS; SUBCUTANEOUS
Qty: 0 | Refills: 0 | Status: DISCONTINUED | OUTPATIENT
Start: 2017-11-30 | End: 2017-12-02

## 2017-11-30 RX ORDER — PIPERACILLIN AND TAZOBACTAM 4; .5 G/20ML; G/20ML
3.38 INJECTION, POWDER, LYOPHILIZED, FOR SOLUTION INTRAVENOUS EVERY 8 HOURS
Qty: 0 | Refills: 0 | Status: DISCONTINUED | OUTPATIENT
Start: 2017-11-30 | End: 2017-12-08

## 2017-11-30 RX ORDER — PIPERACILLIN AND TAZOBACTAM 4; .5 G/20ML; G/20ML
3.38 INJECTION, POWDER, LYOPHILIZED, FOR SOLUTION INTRAVENOUS ONCE
Qty: 0 | Refills: 0 | Status: COMPLETED | OUTPATIENT
Start: 2017-11-30 | End: 2017-12-01

## 2017-11-30 RX ORDER — ACETAMINOPHEN 500 MG
975 TABLET ORAL ONCE
Qty: 0 | Refills: 0 | Status: COMPLETED | OUTPATIENT
Start: 2017-11-30 | End: 2017-11-30

## 2017-11-30 RX ORDER — CEFTRIAXONE 500 MG/1
1 INJECTION, POWDER, FOR SOLUTION INTRAMUSCULAR; INTRAVENOUS ONCE
Qty: 0 | Refills: 0 | Status: COMPLETED | OUTPATIENT
Start: 2017-11-30 | End: 2017-11-30

## 2017-11-30 RX ADMIN — OXYCODONE HYDROCHLORIDE 10 MILLIGRAM(S): 5 TABLET ORAL at 21:40

## 2017-11-30 RX ADMIN — MAGNESIUM OXIDE 400 MG ORAL TABLET 400 MILLIGRAM(S): 241.3 TABLET ORAL at 12:20

## 2017-11-30 RX ADMIN — OXYCODONE HYDROCHLORIDE 10 MILLIGRAM(S): 5 TABLET ORAL at 02:00

## 2017-11-30 RX ADMIN — MAGNESIUM OXIDE 400 MG ORAL TABLET 400 MILLIGRAM(S): 241.3 TABLET ORAL at 08:51

## 2017-11-30 RX ADMIN — Medication 1 TABLET(S): at 17:42

## 2017-11-30 RX ADMIN — CEFTRIAXONE 100 GRAM(S): 500 INJECTION, POWDER, FOR SOLUTION INTRAMUSCULAR; INTRAVENOUS at 02:09

## 2017-11-30 RX ADMIN — Medication 1 TABLET(S): at 22:33

## 2017-11-30 RX ADMIN — Medication 1 TABLET(S): at 08:51

## 2017-11-30 RX ADMIN — OXYCODONE HYDROCHLORIDE 10 MILLIGRAM(S): 5 TABLET ORAL at 13:20

## 2017-11-30 RX ADMIN — MONTELUKAST 10 MILLIGRAM(S): 4 TABLET, CHEWABLE ORAL at 12:20

## 2017-11-30 RX ADMIN — Medication 975 MILLIGRAM(S): at 18:04

## 2017-11-30 RX ADMIN — OXYCODONE HYDROCHLORIDE 10 MILLIGRAM(S): 5 TABLET ORAL at 20:50

## 2017-11-30 RX ADMIN — Medication 1 TABLET(S): at 12:20

## 2017-11-30 RX ADMIN — MAGNESIUM OXIDE 400 MG ORAL TABLET 400 MILLIGRAM(S): 241.3 TABLET ORAL at 17:42

## 2017-11-30 RX ADMIN — OXYCODONE HYDROCHLORIDE 10 MILLIGRAM(S): 5 TABLET ORAL at 01:05

## 2017-11-30 RX ADMIN — OXYCODONE HYDROCHLORIDE 10 MILLIGRAM(S): 5 TABLET ORAL at 12:27

## 2017-11-30 NOTE — PROGRESS NOTE ADULT - SUBJECTIVE AND OBJECTIVE BOX
Patient is a 72y old  Female who presents with a chief complaint of fatigue (2017 15:46)      SUBJECTIVE / OVERNIGHT EVENTS: pt resting quietly- said she felt better after the transfusion- speaks softly  requested that writer call her grand nargis Rojas for further info; couldn't recall name of her cardiologist    MEDICATIONS  (STANDING):  cefTRIAXone   IVPB      magnesium oxide 400 milliGRAM(s) Oral three times a day with meals  montelukast 10 milliGRAM(s) Oral daily  potassium acid phosphate/sodium acid phosphate tablet (K-PHOS No. 2) 1 Tablet(s) Oral four times a day with meals  sodium chloride 0.9%. 1000 milliLiter(s) (100 mL/Hr) IV Continuous <Continuous>    MEDICATIONS  (PRN):  buDESOnide 160 MICROgram(s)/formoterol 4.5 MICROgram(s) Inhaler 2 Puff(s) Inhalation two times a day PRN shortness of breath  oxyCODONE    IR 10 milliGRAM(s) Oral every 6 hours PRN Severe Pain (7 - 10)      Meds ordered within last 24hours  cefTRIAXone   IVPB: [Known as ROCEPHIN  IVPB]  Stat & Then in dextrose 5% 50 milliLiter(s), infuse over 30 Minute(s)  Indication: ?uti  Administration Instructions: This is a Look-alike/Sound-alike Medication  Provider's Contact #: [phone]      1 Gram(s), IV Intermittent, once;      1 Gram(s), IV Intermittent, every 24 hours; ( @ 00:05)  cefTRIAXone   IVPB: [Known as ROCEPHIN  IVPB]  1 Gram(s) in dextrose 5% 50 milliLiter(s), IV Intermittent, once, infuse over 30 Minute(s)  Indication: ?uti  Administration Instructions: This is a Look-alike/Sound-alike Medication  Provider's Contact #: [phone]; (Stat & Then: This is order 1 of 2) ( @ 00:25)  cefTRIAXone   IVPB: [Known as ROCEPHIN  IVPB]  1 Gram(s) in dextrose 5% 50 milliLiter(s), IV Intermittent, every 24 hours, infuse over 30 Minute(s)  Indication: ?uti  Administration Instructions: This is a Look-alike/Sound-alike Medication  Provider's Contact #: [phone]; (Stat & Then: This is order 2 of 2) ( @ 00:25)  acetaminophen   Tablet: [Known as TYLENOL]  975 milliGRAM(s), Oral, once, Stop After 1 Doses  Administration Instructions: MAX DAILY DOSE:  ADULT = 4,000 mG/Day ( @ 17:47)      T(C): 37.2 (17 @ 21:41), Max: 38.7 (17 @ 17:39)  HR: 76 (17 @ 21:41) (62 - 97)  BP: 109/49 (17 @ 21:41) (109/49 - 135/85)  RR: 17 (17 @ 21:41) (16 - 18)  SpO2: 96% (17 @ 21:41) (93% - 97%)    CAPILLARY BLOOD GLUCOSE        I&O's Summary      PHYSICAL EXAM:  GENERAL: NAD  CHEST/LUNG: Clear to auscultation bilaterally; No wheeze  HEART: Regular rate and rhythm; No murmurs, rubs, or gallops  ABDOMEN: firm/full abd Bowel sounds present  EXTREMITIES:  No clubbing, cyanosis, or edema      LABS:                        10.5   17.36 )-----------( 337      ( 2017 06:30 )             33.1         135  |  97<L>  |  18  ----------------------------<  108<H>  3.7   |  24  |  0.76    Ca    8.6      2017 06:30  Phos  3.4       Mg     1.9         TPro  7.1  /  Alb  2.6<L>  /  TBili  2.2<H>  /  DBili  1.0<H>  /  AST  18  /  ALT  13  /  AlkPhos  56      PT/INR - ( 2017 07:00 )   PT: 17.4 SEC;   INR: 1.54            CARDIAC MARKERS ( 2017 10:51 )  x     / < 0.06 ng/mL / 62 u/L / 1.00 ng/mL / x          Urinalysis Basic - ( 2017 01:50 )    Color: YELLOW / Appearance: CLEAR / S.016 / pH: 5.5  Gluc: NEGATIVE / Ketone: NEGATIVE  / Bili: NEGATIVE / Urobili: 1 E.U.   Blood: NEGATIVE / Protein: 30 / Nitrite: NEGATIVE   Leuk Esterase: NEGATIVE / RBC: 0-2 / WBC 2-5   Sq Epi: OCC / Non Sq Epi: x / Bacteria: x        RADIOLOGY & ADDITIONAL TESTS:    Imaging Personally Reviewed:    Consultant(s) Notes Reviewed:      Care Discussed with Consultants/Other Providers:

## 2017-11-30 NOTE — PROGRESS NOTE ADULT - PROBLEM SELECTOR PLAN 3
as per family, patient has been more forgetful this week which can be due to symptomatic anemia vs infectious process. Patient is currently AAOx4  - ua neg  check tsh, b12 ?if pt w early dementia- she has been forgetful for few months w/ short term memory loss ?psych consult for r/o depression.   - PT consulted

## 2017-11-30 NOTE — PROGRESS NOTE ADULT - PROBLEM SELECTOR PLAN 5
(on Carboplatin and Taxol)  - Will continue neuropathy pain management with home oxycodone 10mg q6hr (ISTOP Reference #: [id number])  - seen by heme onc

## 2017-11-30 NOTE — PROGRESS NOTE ADULT - SUBJECTIVE AND OBJECTIVE BOX
Patient with no anginal chest pain or shortness of breath        MEDICATIONS  (STANDING):  cefTRIAXone   IVPB      magnesium oxide 400 milliGRAM(s) Oral three times a day with meals  montelukast 10 milliGRAM(s) Oral daily  potassium acid phosphate/sodium acid phosphate tablet (K-PHOS No. 2) 1 Tablet(s) Oral four times a day with meals  sodium chloride 0.9%. 1000 milliLiter(s) (100 mL/Hr) IV Continuous <Continuous>    MEDICATIONS  (PRN):  buDESOnide 160 MICROgram(s)/formoterol 4.5 MICROgram(s) Inhaler 2 Puff(s) Inhalation two times a day PRN shortness of breath  oxyCODONE    IR 10 milliGRAM(s) Oral every 6 hours PRN Severe Pain (7 - 10)      LABS:                        10.5   17.36 )-----------( 337      ( 30 Nov 2017 06:30 )             33.1       11-30    135  |  97<L>  |  18  ----------------------------<  108<H>  3.7   |  24  |  0.76    Ca    8.6      30 Nov 2017 06:30  Phos  3.4     11-30  Mg     1.9     11-30    TPro  7.1  /  Alb  2.6<L>  /  TBili  2.2<H>  /  DBili  1.0<H>  /  AST  18  /  ALT  13  /  AlkPhos  56  11-29        CARDIAC MARKERS ( 29 Nov 2017 10:51 )  x     / < 0.06 ng/mL / 62 u/L / 1.00 ng/mL / x      CARDIAC MARKERS ( 28 Nov 2017 18:05 )  x     / < 0.06 ng/mL / 65 u/L / 1.00 ng/mL / x              PHYSICAL EXAM:  Vital Signs Last 24 Hrs  T(C): 37.4 (30 Nov 2017 12:16), Max: 38 (29 Nov 2017 22:21)  T(F): 99.4 (30 Nov 2017 12:16), Max: 100.4 (29 Nov 2017 22:21)  HR: 87 (30 Nov 2017 12:16) (62 - 87)  BP: 121/52 (30 Nov 2017 12:16) (117/87 - 137/67)  BP(mean): --  RR: 17 (30 Nov 2017 12:16) (17 - 19)  SpO2: 96% (30 Nov 2017 12:16) (95% - 100%)    HEENT: Normal Oral mucosa, PERRL, EOMI  Lymphatic: No obvious lymphadenopathy, No edema  Cardiovascular: Normal S1S2, No JVD, 1/6 BELINDA, Peripheral pulses palpable 2+ B/L  Respiratory: Lungs clear to auscultation, normal effort  Gastrointestinal: Abdomen soft, ND, NT, +BS  Skin: Warm, dry, intact. No cyanosis, No rash.  Musculoskeletal: Normal ROM, normal strength  Psychiatric: Appropriate Mood and Affect      DIAGNOSTIC DATA  < from: CT Head No Cont (11.28.17 @ 19:35) >  IMPRESSION:   No CT evidence of acute intracranial hemorrhage, brain edema, or mass   effect. No displaced calvarial fracture.      TELEMETRY: none    ASSESSMENT/PLAN: 	  72 F with hx of asthma, atrial fibrillation on Eliquis, with NL LV function on TTE 12/2016, with unremarkable plain TST 3/2016,  endometrial carcinoma (on Carboplatin and Taxol) presenting with one day history of fatigue found to be markedly anemic, s/p 2 units PRBCs transfused, with increased confusion (negative CT head) and leukocytosis :     --Eliquis on hold given profound anemia requiring transfusion]  --Metoprolol on hold.  BP/HR currently stable  - HD stable not in CHF  - infectious/confusion work up per primary medical team     Francheska Tripp [facility]  O:  [phone]  P: [phone]

## 2017-11-30 NOTE — CONSULT NOTE ADULT - SUBJECTIVE AND OBJECTIVE BOX
Oncology Consult Note    HPI:  72 F with hx of asthma, atrial fibrillation on Eliquis, endometrial carcinoma (on Carboplatin and Taxol) presenting with one day history of fatigue. Earlier this afternoon, patient was getting ready for her chemo/ heme-onc appointment today, when she had an episode of lightheadedness and dizziness. Patient reports she was feeling hungry and needed to sit down because she felt like she would faint. After sitting down and eating something, patient felt better. Denied any headaches, visual changes, chest pain, SOB prior to episode. Patient reports taking eliquis 5BID for atrial fibrillation. No melena or hematochezia. However, patient reports visualizing blood in urine since november 7th. The blood is not present in the urine itself, but when she wipes after urination, the blood is present on the napkin. Denies nose bleeds or any other bleeding site. Patient has required several blood transfusions since starting chemo this summer.     Of note, patient had symptoms concerning for TIA in [facility] over the summer. Patient's daughter and granddaughter have noticed the patient was forgetful over the week- confusing the two family members for each other. Patient denies being confused.     In the ED, T 98.2 HR 89 /50 RR 16 SpO2 100. Patient was found to have hemoglobin of 5.5. 1U PRBC was given. For neuropathy pain, patient was given morphine 2 mg and percocet.       Allergies    No Known Allergies    Intolerances        MEDICATIONS  (STANDING):  cefTRIAXone   IVPB      magnesium oxide 400 milliGRAM(s) Oral three times a day with meals  montelukast 10 milliGRAM(s) Oral daily  potassium acid phosphate/sodium acid phosphate tablet (K-PHOS No. 2) 1 Tablet(s) Oral four times a day with meals  sodium chloride 0.9%. 1000 milliLiter(s) (100 mL/Hr) IV Continuous <Continuous>    MEDICATIONS  (PRN):  buDESOnide 160 MICROgram(s)/formoterol 4.5 MICROgram(s) Inhaler 2 Puff(s) Inhalation two times a day PRN shortness of breath  oxyCODONE    IR 10 milliGRAM(s) Oral every 6 hours PRN Severe Pain (7 - 10)      PAST MEDICAL & SURGICAL HISTORY:  Atrial fibrillation, unspecified type  Endometrial cancer  HTN (hypertension)  Asthma  No significant past surgical history      FAMILY HISTORY:  Family history of cystic fibrosis (Uncle)  Family history of amyotrophic lateral sclerosis (Sibling)      SOCIAL HISTORY: No EtOH, no tobacco    REVIEW OF SYSTEMS:    CONSTITUTIONAL: No weakness, fevers or chills  EYES/ENT: No visual changes;  No vertigo or throat pain   NECK: No pain or stiffness  RESPIRATORY: No cough, wheezing, hemoptysis; No shortness of breath  CARDIOVASCULAR: No chest pain or palpitations  GASTROINTESTINAL: No abdominal or epigastric pain. No nausea, vomiting, or hematemesis; No diarrhea or constipation. No melena or hematochezia.  GENITOURINARY: No dysuria, frequency or hematuria  NEUROLOGICAL: No numbness or weakness  SKIN: No itching, burning, rashes, or lesions   All other review of systems is negative unless indicated above.    Height (cm): 152.4 (11-29 @ 22:21)  Weight (kg): 43 (11-29 @ 22:21)  BMI (kg/m2): 18.5 (11-29 @ 22:21)  BSA (m2): 1.36 (11-29 @ 22:21)    T(F): 101.7 (11-30-17 @ 17:39), Max: 101.7 (11-30-17 @ 17:39)  HR: 97 (11-30-17 @ 17:39)  BP: 129/51 (11-30-17 @ 17:39)  RR: 17 (11-30-17 @ 17:39)  SpO2: 93% (11-30-17 @ 17:39)  Wt(kg): --    GENERAL: NAD, well-developed  HEAD:  Atraumatic, Normocephalic  EYES: EOMI, PERRLA, conjunctiva and sclera clear  NECK: Supple, No JVD  CHEST/LUNG: Clear to auscultation bilaterally; No wheeze  HEART: Regular rate and rhythm; No murmurs, rubs, or gallops  ABDOMEN: Soft, Nontender, Nondistended; Bowel sounds present  EXTREMITIES:  2+ Peripheral Pulses, No clubbing, cyanosis, or edema  NEUROLOGY: non-focal  SKIN: No rashes or lesions                          10.5   17.36 )-----------( 337      ( 30 Nov 2017 06:30 )             33.1       11-30    135  |  97<L>  |  18  ----------------------------<  108<H>  3.7   |  24  |  0.76    Ca    8.6      30 Nov 2017 06:30  Phos  3.4     11-30  Mg     1.9     11-30    TPro  7.1  /  Alb  2.6<L>  /  TBili  2.2<H>  /  DBili  1.0<H>  /  AST  18  /  ALT  13  /  AlkPhos  56  11-29      Phosphorus Level, Serum: 3.4 mg/dL (11-30 @ 06:30)  Magnesium, Serum: 1.9 mg/dL (11-30 @ 06:30)  Lactate Dehydrogenase, Serum: 191 U/L (11-30 @ 06:30)  Haptoglobin, Serum: 426 mg/dL (11-30 @ 06:30)  Magnesium, Serum: 1.9 mg/dL (11-29 @ 18:31)  Lactate Dehydrogenase, Serum: 266 U/L (11-29 @ 18:31)

## 2017-11-30 NOTE — CONSULT NOTE ADULT - ASSESSMENT
72 F with metastatic endometrial cancer s/p cycle 8 of carbo/taxol on 11/8/17 who presents for symptomatic anemia, found to have hemoglobin of 5.5:    #Normocytic anemia:  - due to combination of chemotherapy side effect and possible blood loss as patient does endorse intermittent blood when wiping  - s/p 2 units prbcs with appropriate response  - no utility in iron studies since pt already received blood but can do TSH, haptoglobin/LDH, FOBT, vitamin B12/folate    #Metastatic endometrial cancer:  - currently on carbo/taxol  - patient is considering holding off on further chemotherapy given the side effects she has had which is completely reasonable  - outpatient follow-up with Dr. Salcedo

## 2017-11-30 NOTE — PROGRESS NOTE ADULT - PROBLEM SELECTOR PLAN 2
wbc 7 on 11/7 per allscripts- ?infectious process- ua neg cxr neg  - Will monitor off antibiotics for now    If patient becomes febrile, will obtain blood cultures as patient is immunocompromised

## 2017-11-30 NOTE — PROGRESS NOTE ADULT - ATTENDING COMMENTS
Agree with above.   -AC on hold for severe anemia.    Giovanny Hood MD  [facility]  [address], [address]  [place] [address]  office: [phone]  pager: [phone]

## 2017-11-30 NOTE — PROGRESS NOTE ADULT - ASSESSMENT
72 F with hx of asthma, atrial fibrillation on Eliquis, stage 4 endometrial carcinoma (on Carboplatin and Taxol) presenting symptomatic anemia- per grand dgtr pt syncopized

## 2017-12-01 DIAGNOSIS — A41.9 SEPSIS, UNSPECIFIED ORGANISM: ICD-10-CM

## 2017-12-01 LAB
ALBUMIN SERPL ELPH-MCNC: 2.2 G/DL — LOW (ref 3.3–5)
ALP SERPL-CCNC: 59 U/L — SIGNIFICANT CHANGE UP (ref 40–120)
ALT FLD-CCNC: 5 U/L — SIGNIFICANT CHANGE UP (ref 4–33)
AST SERPL-CCNC: 9 U/L — SIGNIFICANT CHANGE UP (ref 4–32)
B PERT DNA SPEC QL NAA+PROBE: SIGNIFICANT CHANGE UP
BACTERIA UR CULT: SIGNIFICANT CHANGE UP
BASOPHILS # BLD AUTO: 0.03 K/UL — SIGNIFICANT CHANGE UP (ref 0–0.2)
BASOPHILS NFR BLD AUTO: 0.2 % — SIGNIFICANT CHANGE UP (ref 0–2)
BILIRUB SERPL-MCNC: 1.4 MG/DL — HIGH (ref 0.2–1.2)
BUN SERPL-MCNC: 17 MG/DL — SIGNIFICANT CHANGE UP (ref 7–23)
C PNEUM DNA SPEC QL NAA+PROBE: NOT DETECTED — SIGNIFICANT CHANGE UP
CALCIUM SERPL-MCNC: 8.1 MG/DL — LOW (ref 8.4–10.5)
CHLORIDE SERPL-SCNC: 98 MMOL/L — SIGNIFICANT CHANGE UP (ref 98–107)
CO2 SERPL-SCNC: 26 MMOL/L — SIGNIFICANT CHANGE UP (ref 22–31)
CREAT SERPL-MCNC: 0.5 MG/DL — SIGNIFICANT CHANGE UP (ref 0.5–1.3)
EOSINOPHIL # BLD AUTO: 0 K/UL — SIGNIFICANT CHANGE UP (ref 0–0.5)
EOSINOPHIL NFR BLD AUTO: 0 % — SIGNIFICANT CHANGE UP (ref 0–6)
FLUAV H1 2009 PAND RNA SPEC QL NAA+PROBE: NOT DETECTED — SIGNIFICANT CHANGE UP
FLUAV H1 RNA SPEC QL NAA+PROBE: NOT DETECTED — SIGNIFICANT CHANGE UP
FLUAV H3 RNA SPEC QL NAA+PROBE: NOT DETECTED — SIGNIFICANT CHANGE UP
FLUAV SUBTYP SPEC NAA+PROBE: SIGNIFICANT CHANGE UP
FLUBV RNA SPEC QL NAA+PROBE: NOT DETECTED — SIGNIFICANT CHANGE UP
GLUCOSE SERPL-MCNC: 88 MG/DL — SIGNIFICANT CHANGE UP (ref 70–99)
HADV DNA SPEC QL NAA+PROBE: NOT DETECTED — SIGNIFICANT CHANGE UP
HCOV 229E RNA SPEC QL NAA+PROBE: NOT DETECTED — SIGNIFICANT CHANGE UP
HCOV HKU1 RNA SPEC QL NAA+PROBE: NOT DETECTED — SIGNIFICANT CHANGE UP
HCOV NL63 RNA SPEC QL NAA+PROBE: NOT DETECTED — SIGNIFICANT CHANGE UP
HCOV OC43 RNA SPEC QL NAA+PROBE: NOT DETECTED — SIGNIFICANT CHANGE UP
HCT VFR BLD CALC: 23.3 % — LOW (ref 34.5–45)
HCT VFR BLD CALC: 27.6 % — LOW (ref 34.5–45)
HGB BLD-MCNC: 7.7 G/DL — LOW (ref 11.5–15.5)
HGB BLD-MCNC: 8.7 G/DL — LOW (ref 11.5–15.5)
HMPV RNA SPEC QL NAA+PROBE: NOT DETECTED — SIGNIFICANT CHANGE UP
HPIV1 RNA SPEC QL NAA+PROBE: NOT DETECTED — SIGNIFICANT CHANGE UP
HPIV2 RNA SPEC QL NAA+PROBE: NOT DETECTED — SIGNIFICANT CHANGE UP
HPIV3 RNA SPEC QL NAA+PROBE: NOT DETECTED — SIGNIFICANT CHANGE UP
HPIV4 RNA SPEC QL NAA+PROBE: NOT DETECTED — SIGNIFICANT CHANGE UP
IMM GRANULOCYTES # BLD AUTO: 0.19 # — SIGNIFICANT CHANGE UP
IMM GRANULOCYTES NFR BLD AUTO: 1.2 % — SIGNIFICANT CHANGE UP (ref 0–1.5)
LYMPHOCYTES # BLD AUTO: 0.88 K/UL — LOW (ref 1–3.3)
LYMPHOCYTES # BLD AUTO: 5.5 % — LOW (ref 13–44)
M PNEUMO DNA SPEC QL NAA+PROBE: NOT DETECTED — SIGNIFICANT CHANGE UP
MCHC RBC-ENTMCNC: 28.3 PG — SIGNIFICANT CHANGE UP (ref 27–34)
MCHC RBC-ENTMCNC: 29.4 PG — SIGNIFICANT CHANGE UP (ref 27–34)
MCHC RBC-ENTMCNC: 31.5 % — LOW (ref 32–36)
MCHC RBC-ENTMCNC: 33 % — SIGNIFICANT CHANGE UP (ref 32–36)
MCV RBC AUTO: 88.9 FL — SIGNIFICANT CHANGE UP (ref 80–100)
MCV RBC AUTO: 89.9 FL — SIGNIFICANT CHANGE UP (ref 80–100)
MONOCYTES # BLD AUTO: 1.29 K/UL — HIGH (ref 0–0.9)
MONOCYTES NFR BLD AUTO: 8.1 % — SIGNIFICANT CHANGE UP (ref 2–14)
NEUTROPHILS # BLD AUTO: 13.56 K/UL — HIGH (ref 1.8–7.4)
NEUTROPHILS NFR BLD AUTO: 85 % — HIGH (ref 43–77)
NRBC # FLD: 0 — SIGNIFICANT CHANGE UP
NRBC # FLD: 0 — SIGNIFICANT CHANGE UP
OB PNL STL: NEGATIVE — SIGNIFICANT CHANGE UP
PLATELET # BLD AUTO: 264 K/UL — SIGNIFICANT CHANGE UP (ref 150–400)
PLATELET # BLD AUTO: 315 K/UL — SIGNIFICANT CHANGE UP (ref 150–400)
PMV BLD: 9.5 FL — SIGNIFICANT CHANGE UP (ref 7–13)
PMV BLD: 9.5 FL — SIGNIFICANT CHANGE UP (ref 7–13)
POTASSIUM SERPL-MCNC: 3.3 MMOL/L — LOW (ref 3.5–5.3)
POTASSIUM SERPL-SCNC: 3.3 MMOL/L — LOW (ref 3.5–5.3)
PROT SERPL-MCNC: 5.8 G/DL — LOW (ref 6–8.3)
RBC # BLD: 2.62 M/UL — LOW (ref 3.8–5.2)
RBC # BLD: 3.07 M/UL — LOW (ref 3.8–5.2)
RBC # FLD: 16.5 % — HIGH (ref 10.3–14.5)
RBC # FLD: 16.5 % — HIGH (ref 10.3–14.5)
RSV RNA SPEC QL NAA+PROBE: NOT DETECTED — SIGNIFICANT CHANGE UP
RV+EV RNA SPEC QL NAA+PROBE: NOT DETECTED — SIGNIFICANT CHANGE UP
SODIUM SERPL-SCNC: 135 MMOL/L — SIGNIFICANT CHANGE UP (ref 135–145)
SPECIMEN SOURCE: SIGNIFICANT CHANGE UP
TSH SERPL-MCNC: 2.95 UIU/ML — SIGNIFICANT CHANGE UP (ref 0.27–4.2)
VIT B12 SERPL-MCNC: 975 PG/ML — HIGH (ref 200–900)
WBC # BLD: 15.47 K/UL — HIGH (ref 3.8–10.5)
WBC # BLD: 15.95 K/UL — HIGH (ref 3.8–10.5)
WBC # FLD AUTO: 15.47 K/UL — HIGH (ref 3.8–10.5)
WBC # FLD AUTO: 15.95 K/UL — HIGH (ref 3.8–10.5)

## 2017-12-01 PROCEDURE — 99221 1ST HOSP IP/OBS SF/LOW 40: CPT | Mod: GC

## 2017-12-01 PROCEDURE — 74177 CT ABD & PELVIS W/CONTRAST: CPT | Mod: 26

## 2017-12-01 PROCEDURE — 99233 SBSQ HOSP IP/OBS HIGH 50: CPT

## 2017-12-01 PROCEDURE — 71260 CT THORAX DX C+: CPT | Mod: 26

## 2017-12-01 PROCEDURE — 99254 IP/OBS CNSLTJ NEW/EST MOD 60: CPT | Mod: GC

## 2017-12-01 RX ORDER — VANCOMYCIN HCL 1 G
1000 VIAL (EA) INTRAVENOUS ONCE
Qty: 0 | Refills: 0 | Status: DISCONTINUED | OUTPATIENT
Start: 2017-12-01 | End: 2017-12-01

## 2017-12-01 RX ORDER — POTASSIUM CHLORIDE 20 MEQ
40 PACKET (EA) ORAL ONCE
Qty: 0 | Refills: 0 | Status: COMPLETED | OUTPATIENT
Start: 2017-12-01 | End: 2017-12-01

## 2017-12-01 RX ORDER — VANCOMYCIN HCL 1 G
750 VIAL (EA) INTRAVENOUS ONCE
Qty: 0 | Refills: 0 | Status: DISCONTINUED | OUTPATIENT
Start: 2017-12-01 | End: 2017-12-01

## 2017-12-01 RX ADMIN — GABAPENTIN 300 MILLIGRAM(S): 400 CAPSULE ORAL at 19:00

## 2017-12-01 RX ADMIN — PIPERACILLIN AND TAZOBACTAM 25 GRAM(S): 4; .5 INJECTION, POWDER, LYOPHILIZED, FOR SOLUTION INTRAVENOUS at 22:30

## 2017-12-01 RX ADMIN — OXYCODONE HYDROCHLORIDE 10 MILLIGRAM(S): 5 TABLET ORAL at 17:10

## 2017-12-01 RX ADMIN — Medication 40 MILLIEQUIVALENT(S): at 09:36

## 2017-12-01 RX ADMIN — OXYCODONE HYDROCHLORIDE 10 MILLIGRAM(S): 5 TABLET ORAL at 03:21

## 2017-12-01 RX ADMIN — OXYCODONE HYDROCHLORIDE 10 MILLIGRAM(S): 5 TABLET ORAL at 23:00

## 2017-12-01 RX ADMIN — Medication 1 TABLET(S): at 09:36

## 2017-12-01 RX ADMIN — OXYCODONE HYDROCHLORIDE 10 MILLIGRAM(S): 5 TABLET ORAL at 22:30

## 2017-12-01 RX ADMIN — MAGNESIUM OXIDE 400 MG ORAL TABLET 400 MILLIGRAM(S): 241.3 TABLET ORAL at 12:40

## 2017-12-01 RX ADMIN — PIPERACILLIN AND TAZOBACTAM 200 GRAM(S): 4; .5 INJECTION, POWDER, LYOPHILIZED, FOR SOLUTION INTRAVENOUS at 04:15

## 2017-12-01 RX ADMIN — GABAPENTIN 300 MILLIGRAM(S): 400 CAPSULE ORAL at 05:39

## 2017-12-01 RX ADMIN — OXYCODONE HYDROCHLORIDE 10 MILLIGRAM(S): 5 TABLET ORAL at 16:20

## 2017-12-01 RX ADMIN — SODIUM CHLORIDE 100 MILLILITER(S): 9 INJECTION INTRAMUSCULAR; INTRAVENOUS; SUBCUTANEOUS at 03:10

## 2017-12-01 RX ADMIN — OXYCODONE HYDROCHLORIDE 10 MILLIGRAM(S): 5 TABLET ORAL at 04:19

## 2017-12-01 RX ADMIN — MAGNESIUM OXIDE 400 MG ORAL TABLET 400 MILLIGRAM(S): 241.3 TABLET ORAL at 09:36

## 2017-12-01 RX ADMIN — MONTELUKAST 10 MILLIGRAM(S): 4 TABLET, CHEWABLE ORAL at 12:40

## 2017-12-01 RX ADMIN — PIPERACILLIN AND TAZOBACTAM 25 GRAM(S): 4; .5 INJECTION, POWDER, LYOPHILIZED, FOR SOLUTION INTRAVENOUS at 14:30

## 2017-12-01 RX ADMIN — Medication 1 TABLET(S): at 12:40

## 2017-12-01 NOTE — PROGRESS NOTE ADULT - PROBLEM SELECTOR PLAN 3
as per family, patient has been more forgetful this week which can be due to symptomatic anemia vs infectious process. Patient is currently AAOx4  - tsh, b12 wnl, As reported by pt she has been forgetful for few months w/ short term memory loss, ?early dementia vs ?psych consult for r/o depression.   - PT evaluation.

## 2017-12-01 NOTE — PROGRESS NOTE ADULT - SUBJECTIVE AND OBJECTIVE BOX
Patient with no anginal chest pain or shortness of breath    MEDICATIONS  (STANDING):  gabapentin 300 milliGRAM(s) Oral two times a day  montelukast 10 milliGRAM(s) Oral daily  piperacillin/tazobactam IVPB. 3.375 Gram(s) IV Intermittent every 8 hours  sodium chloride 0.9%. 1000 milliLiter(s) (100 mL/Hr) IV Continuous <Continuous>    MEDICATIONS  (PRN):  buDESOnide 160 MICROgram(s)/formoterol 4.5 MICROgram(s) Inhaler 2 Puff(s) Inhalation two times a day PRN shortness of breath  oxyCODONE    IR 10 milliGRAM(s) Oral every 6 hours PRN Severe Pain (7 - 10)      LABS:                        7.7    15.95 )-----------( 264      ( 01 Dec 2017 04:00 )             23.3     Hemoglobin: 7.7 g/dL (12-01 @ 04:00)  Hemoglobin: 10.5 g/dL (11-30 @ 06:30)  Hemoglobin: 9.1 g/dL (11-29 @ 18:31)  Hemoglobin: 9.9 g/dL (11-29 @ 10:51)  Hemoglobin: 5.5 g/dL (11-28 @ 18:05)    12-01    135  |  98  |  17  ----------------------------<  88  3.3<L>   |  26  |  0.50    Ca    8.1<L>      01 Dec 2017 04:00  Phos  3.4     11-30  Mg     1.9     11-30    TPro  5.8<L>  /  Alb  2.2<L>  /  TBili  1.4<H>  /  DBili  x   /  AST  9   /  ALT  5   /  AlkPhos  59  12-01    Creatinine Trend: 0.50<--, 0.76<--, 0.63<--, 0.55<--, 0.63<--     CARDIAC MARKERS ( 29 Nov 2017 10:51 )  x     / < 0.06 ng/mL / 62 u/L / 1.00 ng/mL / x      CARDIAC MARKERS ( 28 Nov 2017 18:05 )  x     / < 0.06 ng/mL / 65 u/L / 1.00 ng/mL / x            PHYSICAL EXAM  Vital Signs Last 24 Hrs  T(C): 36.8 (01 Dec 2017 05:33), Max: 38.7 (30 Nov 2017 17:39)  T(F): 98.3 (01 Dec 2017 05:33), Max: 101.7 (30 Nov 2017 17:39)  HR: 75 (01 Dec 2017 05:33) (71 - 97)  BP: 108/52 (01 Dec 2017 05:33) (104/58 - 129/51)  BP(mean): --  RR: 17 (01 Dec 2017 05:33) (16 - 17)  SpO2: 100% (01 Dec 2017 05:33) (93% - 100%)    Cardiovascular: Normal S1S2, No JVD, 1/6 BELINDA, Peripheral pulses palpable 2+ B/L  Respiratory: Lungs clear to auscultation, normal effort  Gastrointestinal: Abdomen soft, ND, NT, +BS  Extremity no clubbing, cyanosis or edema B/L LE's       DIAGNOSTIC DATA  < from: CT Head No Cont (11.28.17 @ 19:35) >  IMPRESSION:   No CT evidence of acute intracranial hemorrhage, brain edema, or mass   effect. No displaced calvarial fracture.      TELEMETRY: none    ASSESSMENT/PLAN: 	  72 F with hx of asthma, atrial fibrillation on Eliquis, with NL LV function on TTE 12/2016, with unremarkable plain TST 3/2016,  endometrial carcinoma (on Carboplatin and Taxol) presenting with one day history of fatigue found to be markedly anemic, s/p 2 units PRBCs transfused, with increased confusion (negative CT head) and leukocytosis :     --Eliquis on hold given profound anemia requiring transfusion]  --Metoprolol on hold.  BP/HR currently stable  - HD stable not in CHF  - infectious/confusion work up & ABX per primary medical team   -F/U Heme Onc Patient with no anginal chest pain or shortness of breath    MEDICATIONS  (STANDING):  gabapentin 300 milliGRAM(s) Oral two times a day  montelukast 10 milliGRAM(s) Oral daily  piperacillin/tazobactam IVPB. 3.375 Gram(s) IV Intermittent every 8 hours  sodium chloride 0.9%. 1000 milliLiter(s) (100 mL/Hr) IV Continuous <Continuous>    MEDICATIONS  (PRN):  buDESOnide 160 MICROgram(s)/formoterol 4.5 MICROgram(s) Inhaler 2 Puff(s) Inhalation two times a day PRN shortness of breath  oxyCODONE    IR 10 milliGRAM(s) Oral every 6 hours PRN Severe Pain (7 - 10)      LABS:                        7.7    15.95 )-----------( 264      ( 01 Dec 2017 04:00 )             23.3     Hemoglobin: 7.7 g/dL (12-01 @ 04:00)  Hemoglobin: 10.5 g/dL (11-30 @ 06:30)  Hemoglobin: 9.1 g/dL (11-29 @ 18:31)  Hemoglobin: 9.9 g/dL (11-29 @ 10:51)  Hemoglobin: 5.5 g/dL (11-28 @ 18:05)    12-01    135  |  98  |  17  ----------------------------<  88  3.3<L>   |  26  |  0.50    Ca    8.1<L>      01 Dec 2017 04:00  Phos  3.4     11-30  Mg     1.9     11-30    TPro  5.8<L>  /  Alb  2.2<L>  /  TBili  1.4<H>  /  DBili  x   /  AST  9   /  ALT  5   /  AlkPhos  59  12-01    Creatinine Trend: 0.50<--, 0.76<--, 0.63<--, 0.55<--, 0.63<--     CARDIAC MARKERS ( 29 Nov 2017 10:51 )  x     / < 0.06 ng/mL / 62 u/L / 1.00 ng/mL / x      CARDIAC MARKERS ( 28 Nov 2017 18:05 )  x     / < 0.06 ng/mL / 65 u/L / 1.00 ng/mL / x            PHYSICAL EXAM  Vital Signs Last 24 Hrs  T(C): 36.8 (01 Dec 2017 05:33), Max: 38.7 (30 Nov 2017 17:39)  T(F): 98.3 (01 Dec 2017 05:33), Max: 101.7 (30 Nov 2017 17:39)  HR: 75 (01 Dec 2017 05:33) (71 - 97)  BP: 108/52 (01 Dec 2017 05:33) (104/58 - 129/51)  BP(mean): --  RR: 17 (01 Dec 2017 05:33) (16 - 17)  SpO2: 100% (01 Dec 2017 05:33) (93% - 100%)    Cardiovascular: Normal S1S2, No JVD, 1/6 BELINDA, Peripheral pulses palpable 2+ B/L  Respiratory: Lungs clear to auscultation, normal effort  Gastrointestinal: Abdomen soft, ND, NT, +BS  Extremity no clubbing, cyanosis or edema B/L LE's       DIAGNOSTIC DATA  < from: CT Head No Cont (11.28.17 @ 19:35) >  IMPRESSION:   No CT evidence of acute intracranial hemorrhage, brain edema, or mass   effect. No displaced calvarial fracture.      TELEMETRY: none    ASSESSMENT/PLAN: 72 F with hx of asthma, atrial fibrillation on Eliquis, with NL LV function on TTE 12/2016, with unremarkable plain TST 3/2016,  endometrial carcinoma (on Carboplatin and Taxol) presenting with one day history of fatigue found to be markedly anemic, s/p 2 units PRBCs transfused, with increased confusion (negative CT head) and leukocytosis     --Eliquis on hold given profound anemia requiring transfusion]  --Metoprolol on hold.  BP/HR currently stable  - HD stable not in CHF  - infectious/confusion work up & ABX per primary medical team   -F/U Heme Onc

## 2017-12-01 NOTE — CONSULT NOTE ADULT - ATTENDING COMMENTS
Patient seen and examined.  Agree with above.   -No clinical heart failure  -ac on hold for anemia and bleeding risk    Giovanny Hood MD  [facility]  [address], [address]  [place] [address]  office: [phone]  pager: [phone]
72 year old female with endometrial carcinoma (On Carboplatin/Taxol; last dose 11/7/17), A-Fib on Eliquis, Asthma presented with fatigue.     In the ED was found to have WBC of 17.63. Hemoglobin was 5.5. UA neg. RVP negative. Bld cxs negative.    Became febrile. CT Chest/Abdomen/Pelvis revealed mural thickening of the sigmoid colon with adjacent air and fluid collection, consistent with contained perforation.     Assessment:  -Sigmoid colon abscess  -Fevers now afebrile  -Leukocytosis  -Anemia due to chemotherapy     Recommend:  -Discontinue vanco.  -Continue zosyn.  -Drainage of collection.   -F/U bld cxs.  -Monitor for fevers and trend WBC.

## 2017-12-01 NOTE — PROVIDER CONTACT NOTE (OTHER) - ACTION/TREATMENT ORDERED:
reorder cbc. patient had 15 sticks, ANMs attempted, patient was astick this morning and probably will need astick again.

## 2017-12-01 NOTE — PROGRESS NOTE ADULT - ATTENDING COMMENTS
CARDIOLOGY ATTENDING    Patient seen and examined. Agree with above. Would hold a/c given that H/H is actively declining despite transfusions. Workup of acute anemia as per primary medical team.

## 2017-12-01 NOTE — CONSULT NOTE ADULT - SUBJECTIVE AND OBJECTIVE BOX
HPI:    73 yo F PMH Endometrial Carcinoma (On Carboplatin/Taxol; last dose 10/17/17), A-Fib on Eliquis, Asthma who presented to [facility] on 17 with fatigue. Prior to presenting patient was going for a chemotherapy / heme-onc appointment but noted onset of lightheadedness/dizziness. She denied any N/V/D prior to presentation. She denies any cough/SOB prior to presentation. No chills or fevers prior to presentation.  In the ED patient was afebrile, normotensive and not tachycardic. In the ED was found to have WBC of 17.63. Hemoglobin was 5.5. U/A with 2-5 WBC. RVP negative. Febrile to 100.4 on  and then to 101.7 on . Received a dose of Ceftriaxone and Zosyn on . Underwent CT Chest/Abdomen/Pelvis on 17 which revealed mural thickening of the sigmoid colon with adjacent air and fluid collection, consistent with contained perforation. Restarted on Vanco/zosyn.       PAST MEDICAL & SURGICAL HISTORY:  Atrial fibrillation, unspecified type  Endometrial cancer  HTN (hypertension)  Asthma  No significant past surgical history      Allergies  No Known Allergies        ANTIMICROBIALS:  piperacillin/tazobactam IVPB. 3.375 every 8 hours  vancomycin  IVPB 1000 once      OTHER MEDS: MEDICATIONS  (STANDING):  buDESOnide 160 MICROgram(s)/formoterol 4.5 MICROgram(s) Inhaler 2 two times a day PRN  gabapentin 300 two times a day  montelukast 10 daily  oxyCODONE    IR 10 every 6 hours PRN      SOCIAL HISTORY:  [ ] etoh [ ] tobacco [ ] former smoker [ ] IVDU    FAMILY HISTORY:  Family history of cystic fibrosis (Uncle)  Family history of amyotrophic lateral sclerosis (Sibling)      REVIEW OF SYSTEMS  [  ] ROS unobtainable because:    [  ] All other systems negative except as noted below:	    Constitutional:  [ ] fever [ ] weight loss  Skin:  [ ] rash [ ] phlebitis	  Eyes: [ ] icterus [ ] inflammation	  ENMT: [ ] discharge [ ] thrush [ ] ulcers [ ] exudates  Respiratory: [ ] dyspnea [ ] hemoptysis [ ] cough [ ] sputum	  Cardiovascular:  [ ] chest pain [ ] palpitations [ ] edema	  Gastrointestinal:  [ ] nausea [ ] vomiting [ ] diarrhea [ ] constipation [ ] pain	  Genitourinary:  [ ] dysuria [ ] frequency [ ] hematuria [ ] discharge [ ] flank pain  Musculoskeletal:  [ ] myalgias [ ] arthralgias [ ] arthritis	  Neurological:  [ ] headache [ ] seizures	  Psychiatric:  [ ] anxiety [ ] depression	  Hematology/Lymphatics:  [ ] lymphadenopathy  Endocrine:  [ ] adrenal [ ] thyroid  Allergic/Immunologic:	 [ ] transplant [ ] seasonal    Vital Signs Last 24 Hrs  T(F): 98.7 (17 @ 15:45), Max: 101.7 (17 @ 17:39)    Vital Signs Last 24 Hrs  HR: 81 (17 @ 15:45) (71 - 97)  BP: 126/45 (17 @ 15:45) (104/58 - 129/51)  RR: 17 (17 @ 15:45)  SpO2: 100% (17 @ 15:45) (93% - 100%)  Wt(kg): --    PHYSICAL EXAM:  General: non-toxic  HEAD/EYES: anicteric, PERRL  ENT:  supple  Cardiovascular:   S1, S2  Respiratory:  clear bilaterally  GI:  soft, non-tender, normal bowel sounds  :  no CVA tenderness   Musculoskeletal:  no synovitis  Neurologic:  grossly non-focal  Skin:  no rash  Lymph: no lymphadenopathy  Psychiatric:  appropriate affect  Vascular:  no phlebitis                                7.7    15.95 )-----------( 264      ( 01 Dec 2017 04:00 )             23.3       12-    135  |  98  |  17  ----------------------------<  88  3.3<L>   |  26  |  0.50    Ca    8.1<L>      01 Dec 2017 04:00  Phos  3.4     11-30  Mg     1.9     11-30    TPro  5.8<L>  /  Alb  2.2<L>  /  TBili  1.4<H>  /  DBili  x   /  AST  9   /  ALT  5   /  AlkPhos  59  12-      Urinalysis Basic - ( 2017 01:50 )    Color: YELLOW / Appearance: CLEAR / S.016 / pH: 5.5  Gluc: NEGATIVE / Ketone: NEGATIVE  / Bili: NEGATIVE / Urobili: 1 E.U.   Blood: NEGATIVE / Protein: 30 / Nitrite: NEGATIVE   Leuk Esterase: NEGATIVE / RBC: 0-2 / WBC 2-5   Sq Epi: OCC / Non Sq Epi: x / Bacteria: x        MICROBIOLOGY:    URINE MIDSTREAM  17 --  --  --      BLOOD PERIPHERAL  17 --  --  --    RADIOLOGY:    EXAM:  CT CHEST IC    EXAM:  CT ABDOMEN AND PELVIS IC    PROCEDURE DATE:  Dec  1 2017   Mural thickening of the sigmoid colon with adjacent air and fluid collection, consistent with contained perforation.  Known endometrial cancer is difficult to delineate. Unchanged left para-aortic lymph node.  Cavitary nodule in left upper lobe demonstrates further interval cavitation. HPI:    71 yo F PMH Endometrial Carcinoma (On Carboplatin/Taxol; last dose 17), A-Fib on Eliquis, Asthma who presented to [facility] on 17 with fatigue. Prior to presenting patient was going for a chemotherapy / heme-onc appointment but noted onset of lightheadedness/dizziness. She denied any N/V/D prior to presentation. She denies any cough/SOB prior to presentation. No chills or fevers prior to presentation.  In the ED patient was afebrile, normotensive and not tachycardic. In the ED was found to have WBC of 17.63. Hemoglobin was 5.5. U/A with 2-5 WBC. RVP negative. Febrile to 100.4 on  and then to 101.7 on . Received a dose of Ceftriaxone and Zosyn on . Underwent CT Chest/Abdomen/Pelvis on 17 which revealed mural thickening of the sigmoid colon with adjacent air and fluid collection, consistent with contained perforation. Restarted on Vanco/zosyn.       PAST MEDICAL & SURGICAL HISTORY:  Atrial fibrillation, unspecified type  Endometrial cancer  HTN (hypertension)  Asthma  No significant past surgical history      Allergies  No Known Allergies        ANTIMICROBIALS:  piperacillin/tazobactam IVPB. 3.375 every 8 hours  vancomycin  IVPB 1000 once      OTHER MEDS: MEDICATIONS  (STANDING):  buDESOnide 160 MICROgram(s)/formoterol 4.5 MICROgram(s) Inhaler 2 two times a day PRN  gabapentin 300 two times a day  montelukast 10 daily  oxyCODONE    IR 10 every 6 hours PRN      SOCIAL HISTORY: No smoking. No EtOH. Born in the . No recent travel. No pets.     FAMILY HISTORY:  Family history of cystic fibrosis (Uncle)  Family history of amyotrophic lateral sclerosis (Sibling)      REVIEW OF SYSTEMS  [  ] ROS unobtainable because:    [ x ] All other systems negative except as noted below:	    Constitutional:  [x ] fever [ ] weight loss  Skin:  [ ] rash [ ] phlebitis	  Eyes: [ ] icterus [ ] inflammation	  ENMT: [ ] discharge [ ] thrush [ ] ulcers [ ] exudates  Respiratory: [ ] dyspnea [ ] hemoptysis [ ] cough [ ] sputum	  Cardiovascular:  [ ] chest pain [ ] palpitations [ ] edema	  Gastrointestinal:  [ ] nausea [ ] vomiting [ ] diarrhea [ ] constipation [x ] pain	  Genitourinary:  [ ] dysuria [ ] frequency [ ] hematuria [ ] discharge [ ] flank pain  Musculoskeletal:  [ ] myalgias [ ] arthralgias [ ] arthritis	  Neurological:  [ ] headache [ ] seizures	  Psychiatric:  [ ] anxiety [ ] depression	  Hematology/Lymphatics:  [ ] lymphadenopathy  Endocrine:  [ ] adrenal [ ] thyroid  Allergic/Immunologic:	 [ ] transplant [ ] seasonal    Vital Signs Last 24 Hrs  T(F): 98.7 (17 @ 15:45), Max: 101.7 (17 @ 17:39)    Vital Signs Last 24 Hrs  HR: 81 (17 @ 15:45) (71 - 97)  BP: 126/45 (17 @ 15:45) (104/58 - 129/51)  RR: 17 (17 @ 15:45)  SpO2: 100% (17 @ 15:45) (93% - 100%)  Wt(kg): --    PHYSICAL EXAMINATION:  General: Alert and Awake, NAD  HEENT: PERRL, EOMI, No subconjunctival hemorrhages, Oropharynx Clear, MMM  Neck: Supple, No DEEPTI  Cardiac: RRR, No M/R/G  Resp: CTAB, No Wh/Rh/Ra  Abdomen: Firm abdomen, mildly distended, moderately tender to palpation in the LUQ and LLQ. No HSM, No rigidity or guarding  MSK: No LE edema. No stigmata of IE. No evidence of phlebitis. No evidence of synovitis.  Back: No CVA Tenderness  Skin: No rashes or lesions. Skin is warm and dry to the touch.   Neuro: Alert and Awake. CN 2-12 Grossly intact. Moves all four extremities spontaneously.  Psych: Calm, Pleasant, Cooperative                        7.7    15.95 )-----------( 264      ( 01 Dec 2017 04:00 )             23.3       12-01    135  |  98  |  17  ----------------------------<  88  3.3<L>   |  26  |  0.50    Ca    8.1<L>      01 Dec 2017 04:00  Phos  3.4     11-30  Mg     1.9     11-30    TPro  5.8<L>  /  Alb  2.2<L>  /  TBili  1.4<H>  /  DBili  x   /  AST  9   /  ALT  5   /  AlkPhos  59  12-      Urinalysis Basic - ( 2017 01:50 )    Color: YELLOW / Appearance: CLEAR / S.016 / pH: 5.5  Gluc: NEGATIVE / Ketone: NEGATIVE  / Bili: NEGATIVE / Urobili: 1 E.U.   Blood: NEGATIVE / Protein: 30 / Nitrite: NEGATIVE   Leuk Esterase: NEGATIVE / RBC: 0-2 / WBC 2-5   Sq Epi: OCC / Non Sq Epi: x / Bacteria: x        MICROBIOLOGY:    URINE MIDSTREAM  17 --  --  --      BLOOD PERIPHERAL  17 --  --  --    RADIOLOGY:    EXAM:  CT CHEST IC    EXAM:  CT ABDOMEN AND PELVIS IC    PROCEDURE DATE:  Dec  1 2017   Mural thickening of the sigmoid colon with adjacent air and fluid collection, consistent with contained perforation.  Known endometrial cancer is difficult to delineate. Unchanged left para-aortic lymph node.  Cavitary nodule in left upper lobe demonstrates further interval cavitation. HPI:    71 yo F PMH Endometrial Carcinoma (On Carboplatin/Taxol; last dose 17), A-Fib on Eliquis, Asthma who presented to [facility] on 17 with fatigue. Prior to presenting patient was going for a chemotherapy / heme-onc appointment but noted onset of lightheadedness/dizziness. She denied any N/V/D prior to presentation. She denies any cough/SOB prior to presentation. No chills or fevers prior to presentation.  In the ED patient was afebrile, normotensive and not tachycardic. In the ED was found to have WBC of 17.63. Hemoglobin was 5.5. U/A with 2-5 WBC. RVP negative. Febrile to 100.4 on  and then to 101.7 on . Received a dose of Ceftriaxone and Zosyn on . Underwent CT Chest/Abdomen/Pelvis on 17 which revealed mural thickening of the sigmoid colon with adjacent air and fluid collection, consistent with contained perforation. Restarted on Vanco/zosyn. Intermittent cramping abdominal pain. No N/V/D. No dysuria, urinary frequency or hesitancy. No cough or shortness of breath.      PAST MEDICAL & SURGICAL HISTORY:  Atrial fibrillation, unspecified type  Endometrial cancer  HTN (hypertension)  Asthma  No significant past surgical history      Allergies  No Known Allergies        ANTIMICROBIALS:  piperacillin/tazobactam IVPB. 3.375 every 8 hours  vancomycin  IVPB 1000 once      OTHER MEDS: MEDICATIONS  (STANDING):  buDESOnide 160 MICROgram(s)/formoterol 4.5 MICROgram(s) Inhaler 2 two times a day PRN  gabapentin 300 two times a day  montelukast 10 daily  oxyCODONE    IR 10 every 6 hours PRN      SOCIAL HISTORY: No smoking. No EtOH. Born in the . No recent travel. No pets.     FAMILY HISTORY:  Family history of cystic fibrosis (Uncle)  Family history of amyotrophic lateral sclerosis (Sibling)      REVIEW OF SYSTEMS  [  ] ROS unobtainable because:    [ x ] All other systems negative except as noted below:	    Constitutional:  [x ] fever [ ] weight loss  Skin:  [ ] rash [ ] phlebitis	  Eyes: [ ] icterus [ ] inflammation	  ENMT: [ ] discharge [ ] thrush [ ] ulcers [ ] exudates  Respiratory: [ ] dyspnea [ ] hemoptysis [ ] cough [ ] sputum	  Cardiovascular:  [ ] chest pain [ ] palpitations [ ] edema	  Gastrointestinal:  [ ] nausea [ ] vomiting [ ] diarrhea [ ] constipation [x ] pain	  Genitourinary:  [ ] dysuria [ ] frequency [ ] hematuria [ ] discharge [ ] flank pain  Musculoskeletal:  [ ] myalgias [ ] arthralgias [ ] arthritis	  Neurological:  [ ] headache [ ] seizures	  Psychiatric:  [ ] anxiety [ ] depression	  Hematology/Lymphatics:  [ ] lymphadenopathy  Endocrine:  [ ] adrenal [ ] thyroid  Allergic/Immunologic:	 [ ] transplant [ ] seasonal    Vital Signs Last 24 Hrs  T(F): 98.7 (17 @ 15:45), Max: 101.7 (17 @ 17:39)    Vital Signs Last 24 Hrs  HR: 81 (17 @ 15:45) (71 - 97)  BP: 126/45 (17 @ 15:45) (104/58 - 129/51)  RR: 17 (17 @ 15:45)  SpO2: 100% (17 @ 15:45) (93% - 100%)  Wt(kg): --    PHYSICAL EXAMINATION:  General: Alert and Awake, NAD  HEENT: PERRL, EOMI, No subconjunctival hemorrhages, Oropharynx Clear, MMM  Neck: Supple, No DEEPTI  Cardiac: RRR, No M/R/G  Resp: CTAB, No Wh/Rh/Ra  Abdomen: Firm abdomen, mildly distended, moderately tender to palpation in the LUQ and LLQ. No HSM, No rigidity or guarding  MSK: No LE edema. No stigmata of IE. No evidence of phlebitis. No evidence of synovitis.  Back: No CVA Tenderness  Skin: No rashes or lesions. Skin is warm and dry to the touch.   Neuro: Alert and Awake. CN 2-12 Grossly intact. Moves all four extremities spontaneously.  Psych: Calm, Pleasant, Cooperative                        7.7    15.95 )-----------( 264      ( 01 Dec 2017 04:00 )             23.3       12    135  |  98  |  17  ----------------------------<  88  3.3<L>   |  26  |  0.50    Ca    8.1<L>      01 Dec 2017 04:00  Phos  3.4     -  Mg     1.9     -    TPro  5.8<L>  /  Alb  2.2<L>  /  TBili  1.4<H>  /  DBili  x   /  AST  9   /  ALT  5   /  AlkPhos  59  12-      Urinalysis Basic - ( 2017 01:50 )    Color: YELLOW / Appearance: CLEAR / S.016 / pH: 5.5  Gluc: NEGATIVE / Ketone: NEGATIVE  / Bili: NEGATIVE / Urobili: 1 E.U.   Blood: NEGATIVE / Protein: 30 / Nitrite: NEGATIVE   Leuk Esterase: NEGATIVE / RBC: 0-2 / WBC 2-5   Sq Epi: OCC / Non Sq Epi: x / Bacteria: x        MICROBIOLOGY:    URINE MIDSTREAM  17 --  --  --      BLOOD PERIPHERAL  17 --  --  --    RADIOLOGY:    EXAM:  CT CHEST IC    EXAM:  CT ABDOMEN AND PELVIS IC    PROCEDURE DATE:  Dec  1 2017   Mural thickening of the sigmoid colon with adjacent air and fluid collection, consistent with contained perforation.  Known endometrial cancer is difficult to delineate. Unchanged left para-aortic lymph node.  Cavitary nodule in left upper lobe demonstrates further interval cavitation.

## 2017-12-01 NOTE — PROGRESS NOTE ADULT - PROBLEM SELECTOR PLAN 5
(on Carboplatin and Taxol)  - Will continue neuropathy pain management with home oxycodone 10mg q6hr (ISTOP Reference #: [id number])  - f/u heme onc

## 2017-12-01 NOTE — PROGRESS NOTE ADULT - SUBJECTIVE AND OBJECTIVE BOX
Patient is a 72y old  Female who presents with a chief complaint of fatigue (2017 15:46)      SUBJECTIVE / OVERNIGHT EVENTS: spiking fever 101.7 yesterday in evening. Pt H/h dropped, on pad count. She denied CP, no SOB or palpitation, no N/V/D.        MEDICATIONS  (STANDING):  gabapentin 300 milliGRAM(s) Oral two times a day  montelukast 10 milliGRAM(s) Oral daily  piperacillin/tazobactam IVPB. 3.375 Gram(s) IV Intermittent every 8 hours  sodium chloride 0.9%. 1000 milliLiter(s) (100 mL/Hr) IV Continuous <Continuous>    MEDICATIONS  (PRN):  buDESOnide 160 MICROgram(s)/formoterol 4.5 MICROgram(s) Inhaler 2 Puff(s) Inhalation two times a day PRN shortness of breath  oxyCODONE    IR 10 milliGRAM(s) Oral every 6 hours PRN Severe Pain (7 - 10)      Vital Signs Last 24 Hrs  T(C): 36.8 (01 Dec 2017 05:33), Max: 38.7 (2017 17:39)  T(F): 98.3 (01 Dec 2017 05:33), Max: 101.7 (2017 17:39)  HR: 75 (01 Dec 2017 05:33) (71 - 97)  BP: 108/52 (01 Dec 2017 05:33) (104/58 - 129/51)  BP(mean): --  RR: 17 (01 Dec 2017 05:33) (16 - 17)  SpO2: 100% (01 Dec 2017 05:33) (93% - 100%)  CAPILLARY BLOOD GLUCOSE        I&O's Summary      PHYSICAL EXAM:  GENERAL: NAD,   HEAD:  Atraumatic, Normocephalic  EYES: EOMI, PERRLA, conjunctiva and sclera clear  NECK: Supple, No JVD  CHEST/LUNG: Clear to auscultation bilaterally; No wheeze  HEART: Regular rate and rhythm; No murmurs, rubs, or gallops  ABDOMEN: Firm/full, Nontender; Bowel sounds present  EXTREMITIES:  2+ Peripheral Pulses, No clubbing, cyanosis, or edema  PSYCH: AAOx3  NEUROLOGY: non-focal  SKIN: No rashes or lesions    LABS:                        7.7    15.95 )-----------( 264      ( 01 Dec 2017 04:00 )             23.3     12-01    135  |  98  |  17  ----------------------------<  88  3.3<L>   |  26  |  0.50    Ca    8.1<L>      01 Dec 2017 04:00  Phos  3.4     11-30  Mg     1.9     11-30    TPro  5.8<L>  /  Alb  2.2<L>  /  TBili  1.4<H>  /  DBili  x   /  AST  9   /  ALT  5   /  AlkPhos  59  12-01          Urinalysis Basic - ( 2017 01:50 )    Color: YELLOW / Appearance: CLEAR / S.016 / pH: 5.5  Gluc: NEGATIVE / Ketone: NEGATIVE  / Bili: NEGATIVE / Urobili: 1 E.U.   Blood: NEGATIVE / Protein: 30 / Nitrite: NEGATIVE   Leuk Esterase: NEGATIVE / RBC: 0-2 / WBC 2-5   Sq Epi: OCC / Non Sq Epi: x / Bacteria: x        Culture - Urine (collected 2017 03:08)  Source: URINE MIDSTREAM  Final Report (01 Dec 2017 08:35):    NO GROWTH AT 24 HOURS    Culture - Blood (collected 2017 00:15)  Source: BLOOD VENOUS  Preliminary Report (01 Dec 2017 00:15):    NO ORGANISMS ISOLATED    NO ORGANISMS ISOLATED AT 24 HOURS    Culture - Blood (collected 2017 00:15)  Source: BLOOD PERIPHERAL  Preliminary Report (01 Dec 2017 00:15):    NO ORGANISMS ISOLATED    NO ORGANISMS ISOLATED AT 24 HOURS          RADIOLOGY & ADDITIONAL TESTS:    Imaging Personally Reviewed:    Consultant(s) Notes Reviewed:      Care Discussed with Consultants/Other Providers:

## 2017-12-01 NOTE — CONSULT NOTE ADULT - ASSESSMENT
Assessment/Plan: 72y Female with symptomatic anemia and fevers, found to have contained perforation of the sigmoid with a large fluid collection. Although patient is not having abdominal pain, will need to rule out pelvic collection as source of fever and leukocytosis.     - Recommend IR consult for pelvic fluid drainage  - NPO after midnight in anticipation of possible procedure  - General surgery will follow     Seen and discussed with Dr. Pabon  Pager [phone]

## 2017-12-01 NOTE — PROGRESS NOTE ADULT - ASSESSMENT
72 F PMH asthma, atrial fibrillation on Eliquis, stage 4 endometrial carcinoma (on Carboplatin and Taxol) presenting symptomatic anemia- per grand dgtr pt syncopized

## 2017-12-01 NOTE — CONSULT NOTE ADULT - ASSESSMENT
73 yo F PMH Endometrial Carcinoma (On Carboplatin/Taxol; last dose 11/7/17), A-Fib on Eliquis, Asthma who presented to [facility] on 11/28/17 with fatigue. Found to be anemic to 5.5 which was suspected to be secondary to 73 yo F PMH Endometrial Carcinoma (On Carboplatin/Taxol; last dose 11/7/17), A-Fib on Eliquis, Asthma who presented to [facility] on 11/28/17 with fatigue. Found to be anemic to 5.5 which was suspected to be secondary to chemotherapy and possible blood loss. Febrile during this admission which prompted CT Chest/Abdomen/Pelvis which revealed a large collection at the sigmoid colon. Likely occurred as a result of her chemotherapy. Would cover her with Zosyn at this time and followup with surgery recommendations. If no plan to drain from a general surgery stand point then would consult IR to drain abscess.  --Stop Vancomycin  --Continue Zosyn 3.375 mg IV Q8H  --F/U General Surgery recommendations; if no plans to drain then consult IR for drainage of abscess.

## 2017-12-01 NOTE — CONSULT NOTE ADULT - SUBJECTIVE AND OBJECTIVE BOX
General Surgery / B team Consult Note  Pager [phone]    HPI:  72 F with hx of asthma, atrial fibrillation on Eliquis, endometrial carcinoma (on Carboplatin and Taxol) admitted for symptomatic anemia with daily fevers during this admission. Patient underwent CT abdomen/pelvis as part of the fever workup and was found to have sigmoid mural thickening with an adjacent fluid collection. Has leukocytosis of 15.5. General surgery consulted for rule out pelvic abscess as source of fever.    Patient recalls having a colonoscopy more than 5 years ago and was not told of any abnormalities. Denies any abdominal pain. Tolerating regular diet. Last passed BM this morning.     PAST MEDICAL & SURGICAL HISTORY:  Atrial fibrillation, unspecified type  Endometrial cancer  HTN (hypertension)  Asthma  Ectopic pregnancy      ALLERGIES: NKA    HOME MEDICATIONS:  · 	budesonide-formoterol 160 mcg-4.5 mcg/inh inhalation aerosol: 2 puff(s) inhaled 2 times a day, Last Dose Taken:    · 	apixaban 5 mg oral tablet: 1 tab(s) orally 2 times a day, Last Dose Taken:    · 	montelukast 10 mg oral tablet: 1 tab(s) orally once a day, Last Dose Taken:    · 	oxyCODONE 10 mg oral tablet: 1 tab(s) orally every 6 hours, As Needed, Last Dose Taken:    · 	magnesium oxide 400 mg (240 mg elemental magnesium) oral tablet: 1 tab(s) orally 2 times a day, Last Dose Taken:    · 	metoprolol succinate 25 mg oral tablet, extended release: 1 tab(s) orally once a day, Last Dose Taken:      SOCIAL HISTORY:  Former nurse.     FAMILY HISTORY:  No relevant family history.  ___________________________________________  REVIEW OF SYSTEMS:  Constitutional: No fevers, chills, no recent weight loss  ENMT: No changes in hearing, no changes in vision, no sore throat, no cough  Respiratory: No shortness of breath  Cardiovascular: No chest pain, palpitations  Gastrointestinal: No abdominal pain, no diarrhea/constipation  Genitourinary: No dysuria, frequency, or urgency    Extremities: No joint swelling, no limited range of movement  Neurological: No paresthesia  Skin: No rashes  ___________________________________________  PHYSICAL EXAM:  Vital Signs Last 24 Hrs  T(C): 37.1 (01 Dec 2017 20:00), Max: 37.1 (01 Dec 2017 13:00)  T(F): 98.8 (01 Dec 2017 20:00), Max: 98.8 (01 Dec 2017 20:00)  HR: 69 (01 Dec 2017 20:00) (69 - 81)  BP: 117/69 (01 Dec 2017 20:00) (104/58 - 126/45)  BP(mean): --  RR: 16 (01 Dec 2017 20:00) (16 - 17)  SpO2: 100% (01 Dec 2017 20:00) (100% - 100%)CAPILLARY BLOOD GLUCOSE      General: A&O x3, NAD  Neuro: CN II-XII intact, motor and sensory grossly intact with no focal deficits.  HEENT: Normocephalic, atraumatic, EOMI, anicteric sclerae.  Respiratory: Clear to auscultation bilaterally, breathing non-labored.  CVS: Regular rate and rhythm  Abdomen: Soft, nondistended, nontender. No rebound, no guarding, No palpable organomegaly or masses. Midline incision scar from previous ectopic pregnancy.   Extremities: Warm bilaterally with palpable pulses.  MSK: Intact ROM.  ____________________________________________  LABS:  CBC Full  -  ( 01 Dec 2017 17:45 )  WBC Count : 15.47 K/uL  Hemoglobin : 8.7 g/dL  Hematocrit : 27.6 %  Platelet Count - Automated : 315 K/uL  Mean Cell Volume : 89.9 fL  Mean Cell Hemoglobin : 28.3 pg  Mean Cell Hemoglobin Concentration : 31.5 %    12    135  |  98  |  17  ----------------------------<  88  3.3<L>   |  26  |  0.50    Ca    8.1<L>      01 Dec 2017 04:00  Phos  3.4     11-30  Mg     1.9     11-30    TPro  5.8<L>  /  Alb  2.2<L>  /  TBili  1.4<H>  /  DBili  x   /  AST  9   /  ALT  5   /  AlkPhos  59  12-    LIVER FUNCTIONS - ( 01 Dec 2017 04:00 )  Alb: 2.2 g/dL / Pro: 5.8 g/dL / ALK PHOS: 59 u/L / ALT: 5 u/L / AST: 9 u/L / GGT: x             Urinalysis Basic - ( 2017 01:50 )    Color: YELLOW / Appearance: CLEAR / S.016 / pH: 5.5  Gluc: NEGATIVE / Ketone: NEGATIVE  / Bili: NEGATIVE / Urobili: 1 E.U.   Blood: NEGATIVE / Protein: 30 / Nitrite: NEGATIVE   Leuk Esterase: NEGATIVE / RBC: 0-2 / WBC 2-5   Sq Epi: OCC / Non Sq Epi: x / Bacteria: x    ____________________________________________  RADIOLOGY:  CT Abdomen and Pelvis w/ IV Cont (17 @ 13:50)   CHEST:     LUNGS AND LARGE AIRWAYS: Patent central airways. Redemonstrated 0.9 x 0.8   cm cavitary lesion in the left upper lobe; overall size is unchanged,   however the central cavity is slightly increased in size while the   surrounding hyperdensity is decreased in thickness. Unchanged 0.4 cm   nodule in the left lower lobe.  PLEURA: Trace bilateral pleural effusions.  VESSELS: No thoracic aortic aneurysm. The vessels are within normal   limits.  HEART: Heart size is normal. No pericardial effusion. Aortic annular   calcifications.  MEDIASTINUM AND ZELALEM: No lymphadenopathy.  CHEST WALL AND LOWER NECK: Within normal limits.    ABDOMEN AND PELVIS:    LIVER: Focal fatty infiltration around the falciform ligament.  BILE DUCTS: Normal caliber.  GALLBLADDER: Within normal limits.  SPLEEN: Within normal limits.  PANCREAS: Within normal limits.  ADRENALS: Within normal limits.  KIDNEYS/URETERS: Within normal limits.    BLADDER: Within normal limits.  REPRODUCTIVEORGANS: Enlarged uterus with multiple leiomyomas, some of   which are calcified. Small amount of fluid within the endometrial cavity.     BOWEL: Mural thickening of the sigmoid colon with adjacent collection of   air and fluid, measuring 5.8 x 8.5 x 8.3 cm anterior/superior to the   uterus (AP x TV x CC; series 2, image 98; series 602, image 25). No bowel   obstruction. Appendix is not definitively visualized, however there is no   evidence of appendicitis.  PERITONEUM: No abdominopelvic ascites or pneumoperitoneum.  VESSELS:  No aortic aneurysm. Mild vascular calcifications.  RETROPERITONEUM: No lymphadenopathy. Unchanged 0.7 x 0.6 cm left   para-aortic lymph node.  ABDOMINAL WALL: Mild anasarca.  BONES: Unchanged sclerotic lesions in the left sixth rib and right   suprapubic ramus. Mild thoracolumbar spondylosis.    IMPRESSION:  Mural thickening of the sigmoid colon with adjacent air and fluid   collection, consistent with contained perforation.    Known endometrial cancer is difficult to delineate. Unchanged left   para-aortic lymph node.    Cavitary nodule in left upper lobe demonstrates further interval   cavitation.

## 2017-12-01 NOTE — CONSULT NOTE ADULT - CONSULT REASON
Fever
Symptomatic anemia, metastatic endometrial cancer on carbo/taxol
abdominal abscess in setting of fevers
Pt with PAF on AC well known to my practice

## 2017-12-01 NOTE — PROGRESS NOTE ADULT - PROBLEM SELECTOR PLAN 2
wbc 7 on 11/7 per allscripts- ?infectious process- ua neg cxr neg  - started zosyn as Pt febrile, f/u blood cultures and Ux, patient is immunocompromised  - f/u CT chest/abd/pelvis for source of infection. wbc 7 on 11/7 per allscripts- ?infectious process- ua neg cxr neg  - Pt spiking fever, with leukocytosis, and HR > 97 with fever, meet sepsis criteria.   - started zosyn as Pt febrile, f/u blood cultures and Ux, patient is immunocompromised  - f/u CT chest/abd/pelvis for source of infection.

## 2017-12-01 NOTE — CHART NOTE - NSCHARTNOTEFT_GEN_A_CORE
Call received from attending Magda Gill , to order  blood culture x 2 ,RVP ,  Ct chest without contrast and Ct abdomen and pelvis with Iv contrast to rule out infectious source  as patient spiked fever in evening  . As per attending start on Zosyn Q8 hours  . IV fluid NS at 100 ml/hour ordered x24 hours .

## 2017-12-02 DIAGNOSIS — N73.9 FEMALE PELVIC INFLAMMATORY DISEASE, UNSPECIFIED: ICD-10-CM

## 2017-12-02 LAB
ALBUMIN SERPL ELPH-MCNC: 2.2 G/DL — LOW (ref 3.3–5)
ALP SERPL-CCNC: 93 U/L — SIGNIFICANT CHANGE UP (ref 40–120)
ALT FLD-CCNC: < 4 U/L — LOW (ref 4–33)
AST SERPL-CCNC: 13 U/L — SIGNIFICANT CHANGE UP (ref 4–32)
BASOPHILS # BLD AUTO: 0.02 K/UL — SIGNIFICANT CHANGE UP (ref 0–0.2)
BASOPHILS NFR BLD AUTO: 0.2 % — SIGNIFICANT CHANGE UP (ref 0–2)
BILIRUB SERPL-MCNC: 1 MG/DL — SIGNIFICANT CHANGE UP (ref 0.2–1.2)
BUN SERPL-MCNC: 9 MG/DL — SIGNIFICANT CHANGE UP (ref 7–23)
CALCIUM SERPL-MCNC: 8.5 MG/DL — SIGNIFICANT CHANGE UP (ref 8.4–10.5)
CHLORIDE SERPL-SCNC: 100 MMOL/L — SIGNIFICANT CHANGE UP (ref 98–107)
CO2 SERPL-SCNC: 25 MMOL/L — SIGNIFICANT CHANGE UP (ref 22–31)
CREAT SERPL-MCNC: 0.53 MG/DL — SIGNIFICANT CHANGE UP (ref 0.5–1.3)
EOSINOPHIL # BLD AUTO: 0.01 K/UL — SIGNIFICANT CHANGE UP (ref 0–0.5)
EOSINOPHIL NFR BLD AUTO: 0.1 % — SIGNIFICANT CHANGE UP (ref 0–6)
GLUCOSE SERPL-MCNC: 74 MG/DL — SIGNIFICANT CHANGE UP (ref 70–99)
GRAM STN FLD: SIGNIFICANT CHANGE UP
HCT VFR BLD CALC: 27.2 % — LOW (ref 34.5–45)
HGB BLD-MCNC: 8.5 G/DL — LOW (ref 11.5–15.5)
IMM GRANULOCYTES # BLD AUTO: 0.1 # — SIGNIFICANT CHANGE UP
IMM GRANULOCYTES NFR BLD AUTO: 0.8 % — SIGNIFICANT CHANGE UP (ref 0–1.5)
LYMPHOCYTES # BLD AUTO: 1.05 K/UL — SIGNIFICANT CHANGE UP (ref 1–3.3)
LYMPHOCYTES # BLD AUTO: 8.8 % — LOW (ref 13–44)
MCHC RBC-ENTMCNC: 28.3 PG — SIGNIFICANT CHANGE UP (ref 27–34)
MCHC RBC-ENTMCNC: 31.3 % — LOW (ref 32–36)
MCV RBC AUTO: 90.7 FL — SIGNIFICANT CHANGE UP (ref 80–100)
MONOCYTES # BLD AUTO: 1.01 K/UL — HIGH (ref 0–0.9)
MONOCYTES NFR BLD AUTO: 8.5 % — SIGNIFICANT CHANGE UP (ref 2–14)
NEUTROPHILS # BLD AUTO: 9.68 K/UL — HIGH (ref 1.8–7.4)
NEUTROPHILS NFR BLD AUTO: 81.6 % — HIGH (ref 43–77)
NRBC # FLD: 0 — SIGNIFICANT CHANGE UP
PLATELET # BLD AUTO: 306 K/UL — SIGNIFICANT CHANGE UP (ref 150–400)
PMV BLD: 9.4 FL — SIGNIFICANT CHANGE UP (ref 7–13)
POTASSIUM SERPL-MCNC: 3.1 MMOL/L — LOW (ref 3.5–5.3)
POTASSIUM SERPL-SCNC: 3.1 MMOL/L — LOW (ref 3.5–5.3)
PROT SERPL-MCNC: 6.8 G/DL — SIGNIFICANT CHANGE UP (ref 6–8.3)
RBC # BLD: 3 M/UL — LOW (ref 3.8–5.2)
RBC # FLD: 16.5 % — HIGH (ref 10.3–14.5)
SODIUM SERPL-SCNC: 139 MMOL/L — SIGNIFICANT CHANGE UP (ref 135–145)
SPECIMEN SOURCE: SIGNIFICANT CHANGE UP
SPECIMEN SOURCE: SIGNIFICANT CHANGE UP
WBC # BLD: 11.87 K/UL — HIGH (ref 3.8–10.5)
WBC # FLD AUTO: 11.87 K/UL — HIGH (ref 3.8–10.5)

## 2017-12-02 PROCEDURE — 99233 SBSQ HOSP IP/OBS HIGH 50: CPT

## 2017-12-02 PROCEDURE — 49406 IMAGE CATH FLUID PERI/RETRO: CPT

## 2017-12-02 RX ORDER — POTASSIUM CHLORIDE 20 MEQ
10 PACKET (EA) ORAL
Qty: 0 | Refills: 0 | Status: COMPLETED | OUTPATIENT
Start: 2017-12-02 | End: 2017-12-02

## 2017-12-02 RX ORDER — SODIUM CHLORIDE 9 MG/ML
1000 INJECTION INTRAMUSCULAR; INTRAVENOUS; SUBCUTANEOUS
Qty: 0 | Refills: 0 | Status: DISCONTINUED | OUTPATIENT
Start: 2017-12-02 | End: 2017-12-03

## 2017-12-02 RX ADMIN — MONTELUKAST 10 MILLIGRAM(S): 4 TABLET, CHEWABLE ORAL at 17:00

## 2017-12-02 RX ADMIN — Medication 100 MILLIEQUIVALENT(S): at 12:07

## 2017-12-02 RX ADMIN — OXYCODONE HYDROCHLORIDE 10 MILLIGRAM(S): 5 TABLET ORAL at 16:38

## 2017-12-02 RX ADMIN — GABAPENTIN 300 MILLIGRAM(S): 400 CAPSULE ORAL at 17:00

## 2017-12-02 RX ADMIN — Medication 100 MILLIEQUIVALENT(S): at 11:09

## 2017-12-02 RX ADMIN — PIPERACILLIN AND TAZOBACTAM 25 GRAM(S): 4; .5 INJECTION, POWDER, LYOPHILIZED, FOR SOLUTION INTRAVENOUS at 05:48

## 2017-12-02 RX ADMIN — Medication 100 MILLIEQUIVALENT(S): at 10:00

## 2017-12-02 RX ADMIN — GABAPENTIN 300 MILLIGRAM(S): 400 CAPSULE ORAL at 05:48

## 2017-12-02 RX ADMIN — SODIUM CHLORIDE 75 MILLILITER(S): 9 INJECTION INTRAMUSCULAR; INTRAVENOUS; SUBCUTANEOUS at 04:14

## 2017-12-02 RX ADMIN — OXYCODONE HYDROCHLORIDE 10 MILLIGRAM(S): 5 TABLET ORAL at 17:30

## 2017-12-02 RX ADMIN — OXYCODONE HYDROCHLORIDE 10 MILLIGRAM(S): 5 TABLET ORAL at 23:52

## 2017-12-02 RX ADMIN — OXYCODONE HYDROCHLORIDE 10 MILLIGRAM(S): 5 TABLET ORAL at 23:22

## 2017-12-02 RX ADMIN — PIPERACILLIN AND TAZOBACTAM 25 GRAM(S): 4; .5 INJECTION, POWDER, LYOPHILIZED, FOR SOLUTION INTRAVENOUS at 23:22

## 2017-12-02 NOTE — CHART NOTE - NSCHARTNOTEFT_GEN_A_CORE
Patient Age: 72    Patient Gender: female    Procedure (including site/side if known): pelvic abcess drainage    Diagnosis/Indication: pelvic fluid collection    Interventional Radiology Attending Physician: Dr. Holman    Ordering Attending Physician: Dr. Falcon    Pertinent medical history: metastatic endometrial cancer    Pertinent Labs: hgb 8.5/27, platelet 306, WBC 11.87, INR 1.54 (11/29/17)- eliquis on hold x 4 days      Patient and Family aware? yes      Attending/Resident/NP/PA    Contact:   Liya Mccarty    [id number]                 Date:        12/2/17                            time: 0836

## 2017-12-02 NOTE — PROGRESS NOTE ADULT - PROBLEM SELECTOR PLAN 8
DVT pxx: SCIDS  Diet: resume Regular diet if cleared with surgery and IR.   Dispo: PT evaluation rehab

## 2017-12-02 NOTE — PROGRESS NOTE ADULT - SUBJECTIVE AND OBJECTIVE BOX
Patient with no anginal chest pain or shortness of breath      Cardiovascular: Normal S1S2, RRR No JVD, 1/6 BELINDA, Peripheral pulses palpable 2+ B/L  Respiratory: Lungs clear to auscultation, normal effort  Gastrointestinal: Abdomen soft, ND, NT, +BS  Extremity no clubbing, cyanosis or edema B/L LE's       DIAGNOSTIC DATA  < from: CT Head No Cont (11.28.17 @ 19:35) >  IMPRESSION:   No CT evidence of acute intracranial hemorrhage, brain edema, or mass   effect. No displaced calvarial fracture.      TELEMETRY: none  EKG: < from: 12 Lead ECG (11.28.17 @ 17:17) >   Sinus rhythm with Premature supraventricular complexes  Nonspecific ST and T wave abnormality    < end of copied text >      ASSESSMENT/PLAN: 72 F with hx of asthma, atrial fibrillation on Eliquis, with NL LV function on TTE 12/2016, with unremarkable plain TST 3/2016,  endometrial carcinoma (on Carboplatin and Taxol) presenting with one day history of fatigue found to be markedly anemic, s/p 2 units PRBCs transfused, with increased confusion (negative CT head) and leukocytosis     --Eliquis on hold given profound anemia requiring transfusion]  --Metoprolol on hold.  BP/HR currently stable  - HD stable not in CHF  - infectious/confusion work up & ABX per primary medical team   -F/U Heme Onc Patient with no anginal chest pain or shortness of breath      Cardiovascular: Normal S1S2, RRR No JVD, 1/6 BELINDA, Peripheral pulses palpable 2+ B/L  Respiratory: Lungs clear to auscultation, normal effort  Gastrointestinal: Abdomen soft, ND, NT, +BS  Extremity no clubbing, cyanosis or edema B/L LE's       DIAGNOSTIC DATA  < from: CT Head No Cont (11.28.17 @ 19:35) >  IMPRESSION:   No CT evidence of acute intracranial hemorrhage, brain edema, or mass   effect. No displaced calvarial fracture.      TELEMETRY: none  EKG: < from: 12 Lead ECG (11.28.17 @ 17:17) >   Sinus rhythm with Premature supraventricular complexes  Nonspecific ST and T wave abnormality    < end of copied text >      ASSESSMENT/PLAN: 72 F with hx of asthma, atrial fibrillation on Eliquis, with NL LV function on TTE 12/2016, with unremarkable plain TST 3/2016,  endometrial carcinoma (on Carboplatin and Taxol) presenting with one day history of fatigue found to be markedly anemic, s/p 2 units PRBCs transfused, with increased confusion (negative CT head) and leukocytosis     --Eliquis on hold given profound anemia requiring transfusion]  --Metoprolol on hold.  BP/HR currently stable  - HD stable not in CHF  - infectious/confusion work up & ABX per primary medical team   - found to have contained perforation of the sigmoid with a large fluid collection- will need to rule out pelvic collection as source of fever and leukocytosis.      ------ Recommend IR consult for pelvic fluid drainage   - F/u IR   -F/U Heme Onc

## 2017-12-02 NOTE — PROGRESS NOTE ADULT - PROBLEM SELECTOR PLAN 2
- Pt spiking fever, with leukocytosis, and HR > 97 with fever, meet sepsis criteria.   - possible due to sigmoid colon abscess/contained perforation on CT   - ID consult appreciated. continue zosyn, f/u blood cultures and Ux, patient is immunocompromised

## 2017-12-02 NOTE — PROGRESS NOTE ADULT - PROBLEM SELECTOR PLAN 5
[id number] Score 5  - HR controlled  - Will hold off on Eliquis in setting of anemia/procedure, for risk of bleeding  - Will hold rate control with metoprolol in setting of symptomatic anemia  - Suggest restarting metoprolol when anemia improves. HR controlled.

## 2017-12-02 NOTE — PROGRESS NOTE ADULT - ATTENDING COMMENTS
Agree with above assessment and plan as outlined above.    - IR f/u for possible drain    Markus Harris MD, FACC  [facility], [facility]  [address] [place] [address]  PHONE:  [phone]  BEEPER : [phone]

## 2017-12-02 NOTE — PROGRESS NOTE ADULT - ASSESSMENT
72 F PMH asthma, atrial fibrillation on Eliquis, stage 4 endometrial carcinoma (on Carboplatin and Taxol) presenting symptomatic anemia- per grand dgtr pt syncopized. C/b sepsis found sigmoid colon mural fluid collection possible contained perforation s/p IR drainage 12/2/17.

## 2017-12-02 NOTE — PROGRESS NOTE ADULT - SUBJECTIVE AND OBJECTIVE BOX
Patient is a 72y old  Female who presents with a chief complaint of fatigue (29 Nov 2017 15:46)      SUBJECTIVE / OVERNIGHT EVENTS: afebrile over night. s/p IR drainage. Pt tolerated procedure well. Pain controlled. no active bleeding.     MEDICATIONS  (STANDING):  gabapentin 300 milliGRAM(s) Oral two times a day  montelukast 10 milliGRAM(s) Oral daily  piperacillin/tazobactam IVPB. 3.375 Gram(s) IV Intermittent every 8 hours  sodium chloride 0.9%. 1000 milliLiter(s) (75 mL/Hr) IV Continuous <Continuous>    MEDICATIONS  (PRN):  buDESOnide 160 MICROgram(s)/formoterol 4.5 MICROgram(s) Inhaler 2 Puff(s) Inhalation two times a day PRN shortness of breath  oxyCODONE    IR 10 milliGRAM(s) Oral every 6 hours PRN Severe Pain (7 - 10)      Vital Signs Last 24 Hrs  T(C): 36.8 (02 Dec 2017 14:44), Max: 37.1 (01 Dec 2017 15:45)  T(F): 98.3 (02 Dec 2017 14:44), Max: 98.8 (01 Dec 2017 20:00)  HR: 70 (02 Dec 2017 14:44) (65 - 81)  BP: 147/60 (02 Dec 2017 14:44) (117/69 - 147/60)  BP(mean): --  RR: 16 (02 Dec 2017 14:44) (16 - 17)  SpO2: 100% (02 Dec 2017 14:44) (100% - 100%)  CAPILLARY BLOOD GLUCOSE        I&O's Summary      PHYSICAL EXAM:  GENERAL: NAD,   HEAD:  Atraumatic, Normocephalic  EYES: EOMI, PERRLA, conjunctiva and sclera clear  NECK: Supple, No JVD  CHEST/LUNG: Clear to auscultation bilaterally; No wheeze  HEART: Regular rate and rhythm; No murmurs, rubs, or gallops  ABDOMEN: Soft, Nontender, Nondistended; Bowel sounds present. insertion site dressing C/D/I.   EXTREMITIES:  2+ Peripheral Pulses, No clubbing, cyanosis, or edema  PSYCH: AAOx3  NEUROLOGY: non-focal  SKIN: No rashes or lesions    LABS:                        8.5    11.87 )-----------( 306      ( 02 Dec 2017 05:40 )             27.2     12-02    139  |  100  |  9   ----------------------------<  74  3.1<L>   |  25  |  0.53    Ca    8.5      02 Dec 2017 05:40    TPro  6.8  /  Alb  2.2<L>  /  TBili  1.0  /  DBili  x   /  AST  13  /  ALT  < 4<L>  /  AlkPhos  93  12-02              Culture - Blood (collected 01 Dec 2017 04:46)  Source: BLOOD PERIPHERAL  Preliminary Report (02 Dec 2017 04:46):    NO ORGANISMS ISOLATED    NO ORGANISMS ISOLATED AT 24 HOURS    Culture - Urine (collected 30 Nov 2017 03:08)  Source: URINE MIDSTREAM  Final Report (01 Dec 2017 08:35):    NO GROWTH AT 24 HOURS    Culture - Blood (collected 30 Nov 2017 00:15)  Source: BLOOD VENOUS  Preliminary Report (02 Dec 2017 00:15):    NO ORGANISMS ISOLATED    NO ORGANISMS ISOLATED AT 48 HRS.    Culture - Blood (collected 30 Nov 2017 00:15)  Source: BLOOD PERIPHERAL  Preliminary Report (02 Dec 2017 00:15):    NO ORGANISMS ISOLATED    NO ORGANISMS ISOLATED AT 48 HRS.          RADIOLOGY & ADDITIONAL TESTS:    Imaging Personally Reviewed:    < from: CT Abdomen and Pelvis w/ IV Cont (12.01.17 @ 13:50) >  IMPRESSION:  Mural thickening of the sigmoid colon with adjacent air and fluid   collection, consistent with contained perforation.    Known endometrial cancer is difficult to delineate. Unchanged left   para-aortic lymph node.    Cavitary nodule in left upper lobe demonstrates further interval   cavitation.      < end of copied text >      Consultant(s) Notes Reviewed:  surgery, ID    Care Discussed with Consultants/Other Providers:

## 2017-12-03 DIAGNOSIS — K57.20 DIVERTICULITIS OF LARGE INTESTINE WITH PERFORATION AND ABSCESS WITHOUT BLEEDING: ICD-10-CM

## 2017-12-03 LAB
ALBUMIN SERPL ELPH-MCNC: 2.1 G/DL — LOW (ref 3.3–5)
ALP SERPL-CCNC: 59 U/L — SIGNIFICANT CHANGE UP (ref 40–120)
ALT FLD-CCNC: < 4 U/L — LOW (ref 4–33)
AST SERPL-CCNC: 10 U/L — SIGNIFICANT CHANGE UP (ref 4–32)
BASOPHILS # BLD AUTO: 0.03 K/UL — SIGNIFICANT CHANGE UP (ref 0–0.2)
BASOPHILS NFR BLD AUTO: 0.3 % — SIGNIFICANT CHANGE UP (ref 0–2)
BILIRUB SERPL-MCNC: 0.9 MG/DL — SIGNIFICANT CHANGE UP (ref 0.2–1.2)
BUN SERPL-MCNC: 9 MG/DL — SIGNIFICANT CHANGE UP (ref 7–23)
CALCIUM SERPL-MCNC: 8.4 MG/DL — SIGNIFICANT CHANGE UP (ref 8.4–10.5)
CHLORIDE SERPL-SCNC: 102 MMOL/L — SIGNIFICANT CHANGE UP (ref 98–107)
CO2 SERPL-SCNC: 24 MMOL/L — SIGNIFICANT CHANGE UP (ref 22–31)
CREAT SERPL-MCNC: 0.47 MG/DL — LOW (ref 0.5–1.3)
EOSINOPHIL # BLD AUTO: 0.07 K/UL — SIGNIFICANT CHANGE UP (ref 0–0.5)
EOSINOPHIL NFR BLD AUTO: 0.8 % — SIGNIFICANT CHANGE UP (ref 0–6)
GLUCOSE SERPL-MCNC: 75 MG/DL — SIGNIFICANT CHANGE UP (ref 70–99)
HCT VFR BLD CALC: 27.6 % — LOW (ref 34.5–45)
HGB BLD-MCNC: 8.7 G/DL — LOW (ref 11.5–15.5)
IMM GRANULOCYTES # BLD AUTO: 0.07 # — SIGNIFICANT CHANGE UP
IMM GRANULOCYTES NFR BLD AUTO: 0.8 % — SIGNIFICANT CHANGE UP (ref 0–1.5)
LYMPHOCYTES # BLD AUTO: 1.42 K/UL — SIGNIFICANT CHANGE UP (ref 1–3.3)
LYMPHOCYTES # BLD AUTO: 15.7 % — SIGNIFICANT CHANGE UP (ref 13–44)
MCHC RBC-ENTMCNC: 29.1 PG — SIGNIFICANT CHANGE UP (ref 27–34)
MCHC RBC-ENTMCNC: 31.5 % — LOW (ref 32–36)
MCV RBC AUTO: 92.3 FL — SIGNIFICANT CHANGE UP (ref 80–100)
MONOCYTES # BLD AUTO: 0.66 K/UL — SIGNIFICANT CHANGE UP (ref 0–0.9)
MONOCYTES NFR BLD AUTO: 7.3 % — SIGNIFICANT CHANGE UP (ref 2–14)
NEUTROPHILS # BLD AUTO: 6.82 K/UL — SIGNIFICANT CHANGE UP (ref 1.8–7.4)
NEUTROPHILS NFR BLD AUTO: 75.1 % — SIGNIFICANT CHANGE UP (ref 43–77)
NRBC # FLD: 0 — SIGNIFICANT CHANGE UP
PLATELET # BLD AUTO: 335 K/UL — SIGNIFICANT CHANGE UP (ref 150–400)
PMV BLD: 9.4 FL — SIGNIFICANT CHANGE UP (ref 7–13)
POTASSIUM SERPL-MCNC: 3.1 MMOL/L — LOW (ref 3.5–5.3)
POTASSIUM SERPL-SCNC: 3.1 MMOL/L — LOW (ref 3.5–5.3)
PROT SERPL-MCNC: 6.7 G/DL — SIGNIFICANT CHANGE UP (ref 6–8.3)
RBC # BLD: 2.99 M/UL — LOW (ref 3.8–5.2)
RBC # FLD: 16.5 % — HIGH (ref 10.3–14.5)
SODIUM SERPL-SCNC: 140 MMOL/L — SIGNIFICANT CHANGE UP (ref 135–145)
WBC # BLD: 9.07 K/UL — SIGNIFICANT CHANGE UP (ref 3.8–10.5)
WBC # FLD AUTO: 9.07 K/UL — SIGNIFICANT CHANGE UP (ref 3.8–10.5)

## 2017-12-03 PROCEDURE — 99233 SBSQ HOSP IP/OBS HIGH 50: CPT

## 2017-12-03 RX ORDER — POTASSIUM CHLORIDE 20 MEQ
40 PACKET (EA) ORAL EVERY 4 HOURS
Qty: 0 | Refills: 0 | Status: COMPLETED | OUTPATIENT
Start: 2017-12-03 | End: 2017-12-03

## 2017-12-03 RX ORDER — SODIUM CHLORIDE 9 MG/ML
1000 INJECTION INTRAMUSCULAR; INTRAVENOUS; SUBCUTANEOUS
Qty: 0 | Refills: 0 | Status: DISCONTINUED | OUTPATIENT
Start: 2017-12-03 | End: 2017-12-08

## 2017-12-03 RX ORDER — GABAPENTIN 400 MG/1
300 CAPSULE ORAL ONCE
Qty: 0 | Refills: 0 | Status: DISCONTINUED | OUTPATIENT
Start: 2017-12-03 | End: 2017-12-03

## 2017-12-03 RX ADMIN — Medication 40 MILLIEQUIVALENT(S): at 10:21

## 2017-12-03 RX ADMIN — SODIUM CHLORIDE 75 MILLILITER(S): 9 INJECTION INTRAMUSCULAR; INTRAVENOUS; SUBCUTANEOUS at 05:15

## 2017-12-03 RX ADMIN — PIPERACILLIN AND TAZOBACTAM 25 GRAM(S): 4; .5 INJECTION, POWDER, LYOPHILIZED, FOR SOLUTION INTRAVENOUS at 14:02

## 2017-12-03 RX ADMIN — GABAPENTIN 300 MILLIGRAM(S): 400 CAPSULE ORAL at 17:18

## 2017-12-03 RX ADMIN — OXYCODONE HYDROCHLORIDE 10 MILLIGRAM(S): 5 TABLET ORAL at 19:33

## 2017-12-03 RX ADMIN — OXYCODONE HYDROCHLORIDE 10 MILLIGRAM(S): 5 TABLET ORAL at 20:20

## 2017-12-03 RX ADMIN — PIPERACILLIN AND TAZOBACTAM 25 GRAM(S): 4; .5 INJECTION, POWDER, LYOPHILIZED, FOR SOLUTION INTRAVENOUS at 22:06

## 2017-12-03 RX ADMIN — GABAPENTIN 300 MILLIGRAM(S): 400 CAPSULE ORAL at 05:16

## 2017-12-03 RX ADMIN — MONTELUKAST 10 MILLIGRAM(S): 4 TABLET, CHEWABLE ORAL at 14:02

## 2017-12-03 RX ADMIN — OXYCODONE HYDROCHLORIDE 10 MILLIGRAM(S): 5 TABLET ORAL at 10:33

## 2017-12-03 RX ADMIN — OXYCODONE HYDROCHLORIDE 10 MILLIGRAM(S): 5 TABLET ORAL at 11:15

## 2017-12-03 RX ADMIN — Medication 40 MILLIEQUIVALENT(S): at 14:02

## 2017-12-03 RX ADMIN — PIPERACILLIN AND TAZOBACTAM 25 GRAM(S): 4; .5 INJECTION, POWDER, LYOPHILIZED, FOR SOLUTION INTRAVENOUS at 05:15

## 2017-12-03 NOTE — PROGRESS NOTE ADULT - SUBJECTIVE AND OBJECTIVE BOX
Patient is a 72y old  Female who presents with a chief complaint of fatigue (29 Nov 2017 15:46)      SUBJECTIVE / OVERNIGHT EVENTS: Afebrile, Pt denied pain. tolerated oral diet. s/p percutaneous drainage cathter.     MEDICATIONS  (STANDING):  gabapentin 300 milliGRAM(s) Oral two times a day  montelukast 10 milliGRAM(s) Oral daily  piperacillin/tazobactam IVPB. 3.375 Gram(s) IV Intermittent every 8 hours  sodium chloride 0.9%. 1000 milliLiter(s) (75 mL/Hr) IV Continuous <Continuous>    MEDICATIONS  (PRN):  buDESOnide 160 MICROgram(s)/formoterol 4.5 MICROgram(s) Inhaler 2 Puff(s) Inhalation two times a day PRN shortness of breath  oxyCODONE    IR 10 milliGRAM(s) Oral every 6 hours PRN Severe Pain (7 - 10)      Vital Signs Last 24 Hrs  T(C): 36.7 (03 Dec 2017 13:30), Max: 37 (02 Dec 2017 18:00)  T(F): 98 (03 Dec 2017 13:30), Max: 98.6 (02 Dec 2017 18:00)  HR: 66 (03 Dec 2017 13:30) (65 - 76)  BP: 133/64 (03 Dec 2017 13:30) (133/60 - 140/68)  BP(mean): --  RR: 16 (03 Dec 2017 13:30) (16 - 16)  SpO2: 99% (03 Dec 2017 13:30) (99% - 100%)  CAPILLARY BLOOD GLUCOSE        I&O's Summary    02 Dec 2017 07:01  -  03 Dec 2017 07:00  --------------------------------------------------------  IN: 0 mL / OUT: 60 mL / NET: -60 mL    03 Dec 2017 07:01  -  03 Dec 2017 16:04  --------------------------------------------------------  IN: 0 mL / OUT: 20 mL / NET: -20 mL        PHYSICAL EXAM:  GENERAL: NAD,  HEAD:  Atraumatic, Normocephalic  EYES: EOMI, PERRLA, conjunctiva and sclera clear  NECK: Supple, No JVD  CHEST/LUNG: Clear to auscultation bilaterally; No wheeze  HEART: Regular rate and rhythm; No murmurs, rubs, or gallops  ABDOMEN: Soft, Nontender, Nondistended; Bowel sounds present. s/p percutaneous drainage cathter.   EXTREMITIES:  2+ Peripheral Pulses, No clubbing, cyanosis, or edema.   PSYCH: AAOx3  NEUROLOGY: non-focal  SKIN: No rashes or lesions    LABS:                        8.7    9.07  )-----------( 335      ( 03 Dec 2017 05:35 )             27.6     12-03    140  |  102  |  9   ----------------------------<  75  3.1<L>   |  24  |  0.47<L>    Ca    8.4      03 Dec 2017 05:35    TPro  6.7  /  Alb  2.1<L>  /  TBili  0.9  /  DBili  x   /  AST  10  /  ALT  < 4<L>  /  AlkPhos  59  12-03              Culture - Body Fluid with Gram Stain (collected 02 Dec 2017 13:59)  Source: DRAINAGE  Gram Stain (02 Dec 2017 15:50):    GPCPR^Gram Pos Cocci in Pairs    QUANTITY OF BACTERIA SEEN: MODERATE (3+)    GPR^Gram Positive Rods    QUANTITY OF BACTERIA SEEN: MODERATE (3+)    WBC^White Blood Cells    QNTY CELLS IN GRAM STAIN: MANY (4+)  Preliminary Report (03 Dec 2017 11:27):    CULTURE IN PROGRESS, FURTHER REPORT TO FOLLOW.    Culture - Blood (collected 01 Dec 2017 04:46)  Source: BLOOD PERIPHERAL  Preliminary Report (03 Dec 2017 04:46):    NO ORGANISMS ISOLATED    NO ORGANISMS ISOLATED AT 48 HRS.          RADIOLOGY & ADDITIONAL TESTS:    Imaging Personally Reviewed:     Consultant(s) Notes Reviewed:  surgery, cardiology    Care Discussed with Consultants/Other Providers:

## 2017-12-03 NOTE — PROGRESS NOTE ADULT - SUBJECTIVE AND OBJECTIVE BOX
B-TEAM SURGERY PROGRESS NOTE:   Pager: [phone]    Interim Events: Patient underwent IR drainage yesterday; 12 Tamazight drainage catheter placed with 180cc purulent fluid aspirated.         Physical Exam  Vital Signs Last 24 Hrs  T(C): 36.7 (03 Dec 2017 09:30), Max: 37 (02 Dec 2017 18:00)  T(F): 98.1 (03 Dec 2017 09:30), Max: 98.6 (02 Dec 2017 18:00)  HR: 65 (03 Dec 2017 09:30) (65 - 76)  BP: 134/57 (03 Dec 2017 09:30) (133/60 - 147/60)  BP(mean): --  RR: 16 (03 Dec 2017 09:30) (16 - 16)  SpO2: 100% (03 Dec 2017 09:30) (100% - 100%)    Gen: NAD  HEENT: normocephalic, atraumatic, no scleral icterus  CV: S1, S2, RRR  Pulm: CTA B/L  Abd: Soft, ND, mildly tender around drain site. Purulent drainage from IR catheter.     I&O's Detail    02 Dec 2017 07:01  -  03 Dec 2017 07:00  --------------------------------------------------------  IN:  Total IN: 0 mL    OUT:    Drain: 60 mL  Total OUT: 60 mL    Total NET: -60 mL          Labs:                        8.7    9.07  )-----------( 335      ( 03 Dec 2017 05:35 )             27.6     12-03    140  |  102  |  9   ----------------------------<  75  3.1<L>   |  24  |  0.47<L>    Ca    8.4      03 Dec 2017 05:35    TPro  6.7  /  Alb  2.1<L>  /  TBili  0.9  /  DBili  x   /  AST  10  /  ALT  < 4<L>  /  AlkPhos  59  12-03

## 2017-12-03 NOTE — PROGRESS NOTE ADULT - ASSESSMENT
73 yo woman with symptomatic anemia and fevers, found to have contained perforation of the sigmoid with a large fluid collection now s/p IR transabdominal drainage with 12F catheter. Patient's leukocytosis now improved, afebrile.     - c/w Zosyn  - Care of catheter per IR  - No acute surgical intervention  - General surgery will follow     ASETR Segal MD PGY2  B-team surgery: [id number]

## 2017-12-03 NOTE — PROGRESS NOTE ADULT - SUBJECTIVE AND OBJECTIVE BOX
Patient with no anginal chest pain or shortness of breath    MEDICATIONS  (STANDING):  gabapentin 300 milliGRAM(s) Oral two times a day  montelukast 10 milliGRAM(s) Oral daily  piperacillin/tazobactam IVPB. 3.375 Gram(s) IV Intermittent every 8 hours  potassium chloride    Tablet ER 40 milliEquivalent(s) Oral every 4 hours  sodium chloride 0.9%. 1000 milliLiter(s) (75 mL/Hr) IV Continuous <Continuous>    MEDICATIONS  (PRN):  buDESOnide 160 MICROgram(s)/formoterol 4.5 MICROgram(s) Inhaler 2 Puff(s) Inhalation two times a day PRN shortness of breath  oxyCODONE    IR 10 milliGRAM(s) Oral every 6 hours PRN Severe Pain (7 - 10)      LABS:                        8.7    9.07  )-----------( 335      ( 03 Dec 2017 05:35 )             27.6     Hemoglobin: 8.7 g/dL (12-03 @ 05:35)  Hemoglobin: 8.5 g/dL (12-02 @ 05:40)  Hemoglobin: 8.7 g/dL (12-01 @ 17:45)  Hemoglobin: 7.7 g/dL (12-01 @ 04:00)  Hemoglobin: 10.5 g/dL (11-30 @ 06:30)    12-03    140  |  102  |  9   ----------------------------<  75  3.1<L>   |  24  |  0.47<L>    Ca    8.4      03 Dec 2017 05:35    TPro  6.7  /  Alb  2.1<L>  /  TBili  0.9  /  DBili  x   /  AST  10  /  ALT  < 4<L>  /  AlkPhos  59  12-03    Creatinine Trend: 0.47<--, 0.53<--, 0.50<--, 0.76<--, 0.63<--, 0.55<--           PHYSICAL EXAM  Vital Signs Last 24 Hrs  T(C): 36.7 (03 Dec 2017 09:30), Max: 37 (02 Dec 2017 18:00)  T(F): 98.1 (03 Dec 2017 09:30), Max: 98.6 (02 Dec 2017 18:00)  HR: 65 (03 Dec 2017 09:30) (65 - 76)  BP: 134/57 (03 Dec 2017 09:30) (133/60 - 147/60)  BP(mean): --  RR: 16 (03 Dec 2017 09:30) (16 - 16)  SpO2: 100% (03 Dec 2017 09:30) (100% - 100%)      Cardiovascular: Normal S1S2, RRR No JVD, 1/6 BELINDA, Peripheral pulses palpable 2+ B/L  Respiratory: Lungs clear to auscultation, normal effort  Gastrointestinal: Abdomen soft, ND, NT, +BS  Extremity no clubbing, cyanosis or edema B/L LE's       DIAGNOSTIC DATA  < from: CT Head No Cont (11.28.17 @ 19:35) >  IMPRESSION:   No CT evidence of acute intracranial hemorrhage, brain edema, or mass   effect. No displaced calvarial fracture.      TELEMETRY: none  EKG: < from: 12 Lead ECG (11.28.17 @ 17:17) >   Sinus rhythm with Premature supraventricular complexes  Nonspecific ST and T wave abnormality    < end of copied text >      ASSESSMENT/PLAN: 72 F with hx of asthma, atrial fibrillation on Eliquis, with NL LV function on TTE 12/2016, with unremarkable plain TST 3/2016,  endometrial carcinoma (on Carboplatin and Taxol) presenting with one day history of fatigue found to be markedly anemic, s/p 2 units PRBCs transfused, with increased confusion (negative CT head) and leukocytosis     --Eliquis on hold given profound anemia requiring transfusion   --Metoprolol on hold.  BP/HR currently stable  - HD stable not in CHF  - infectious/confusion work up & ABX per primary medical team   - found to have contained perforation of the sigmoid with a large fluid collection- will need to rule out pelvic collection as source of fever and leukocytosis.      ------ s/p  IR  pelvic fluid drainage f/u fluid cxs   -F/U Heme Onc

## 2017-12-03 NOTE — PROGRESS NOTE ADULT - ATTENDING COMMENTS
Agree with above assessment and plan as outlined above.    - Eliquis on hold    Markus Harris MD, FACC  [facility], [facility]  [address] [place] [address]  PHONE:  [phone]  BEEPER : [phone]

## 2017-12-03 NOTE — PROGRESS NOTE ADULT - ASSESSMENT
72 F PMH asthma, atrial fibrillation on Eliquis, stage 4 endometrial carcinoma (on Carboplatin and Taxol) presenting symptomatic anemia- per grand dgtr pt syncopized. C/b sepsis found sigmoid colon mural fluid collection possible contained perforation s/p IR drainage 12/2/17 with percutaneous drainage catheter.

## 2017-12-03 NOTE — PROGRESS NOTE ADULT - PROBLEM SELECTOR PLAN 2
- Pt spiking fever, with leukocytosis, and HR > 97 with fever, meet sepsis criteria.   - possible due to sigmoid colon abscess/contained perforation on CT   - ID consult appreciated. continue zosyn, s/p IR percutaneous drainage catheter.   - f/u blood cultures and Ux, fluid Cx for I&S, patient is immunocompromised. Monitor WBC.

## 2017-12-03 NOTE — PROGRESS NOTE ADULT - PROBLEM SELECTOR PLAN 3
- s/p IR drainage, f/u fluid Cx fo I &S. Tolerated oral diet. Surgery and IR follow up.   - surgery and IR follow up, continue zosyn. Leukocytosis improved, fever resolved.   - pain management.

## 2017-12-03 NOTE — PROGRESS NOTE ADULT - PROBLEM SELECTOR PLAN 5
[id number] Score 5  - HR controlled  - Will hold off on Eliquis in setting of anemia/procedure, for risk of bleeding  - Will hold rate control with metoprolol in setting of symptomatic anemia  - Suggest restarting metoprolol when anemia improves. HR controlled.

## 2017-12-04 LAB
ALBUMIN SERPL ELPH-MCNC: 2.2 G/DL — LOW (ref 3.3–5)
ALP SERPL-CCNC: 51 U/L — SIGNIFICANT CHANGE UP (ref 40–120)
ALT FLD-CCNC: 4 U/L — SIGNIFICANT CHANGE UP (ref 4–33)
AST SERPL-CCNC: 11 U/L — SIGNIFICANT CHANGE UP (ref 4–32)
BASOPHILS # BLD AUTO: 0.03 K/UL — SIGNIFICANT CHANGE UP (ref 0–0.2)
BASOPHILS NFR BLD AUTO: 0.4 % — SIGNIFICANT CHANGE UP (ref 0–2)
BILIRUB SERPL-MCNC: 0.6 MG/DL — SIGNIFICANT CHANGE UP (ref 0.2–1.2)
BUN SERPL-MCNC: 5 MG/DL — LOW (ref 7–23)
CALCIUM SERPL-MCNC: 8.3 MG/DL — LOW (ref 8.4–10.5)
CHLORIDE SERPL-SCNC: 107 MMOL/L — SIGNIFICANT CHANGE UP (ref 98–107)
CO2 SERPL-SCNC: 26 MMOL/L — SIGNIFICANT CHANGE UP (ref 22–31)
CREAT SERPL-MCNC: 0.49 MG/DL — LOW (ref 0.5–1.3)
EOSINOPHIL # BLD AUTO: 0.16 K/UL — SIGNIFICANT CHANGE UP (ref 0–0.5)
EOSINOPHIL NFR BLD AUTO: 2 % — SIGNIFICANT CHANGE UP (ref 0–6)
GLUCOSE SERPL-MCNC: 83 MG/DL — SIGNIFICANT CHANGE UP (ref 70–99)
HCT VFR BLD CALC: 26.8 % — LOW (ref 34.5–45)
HGB BLD-MCNC: 8 G/DL — LOW (ref 11.5–15.5)
IMM GRANULOCYTES # BLD AUTO: 0.09 # — SIGNIFICANT CHANGE UP
IMM GRANULOCYTES NFR BLD AUTO: 1.1 % — SIGNIFICANT CHANGE UP (ref 0–1.5)
LYMPHOCYTES # BLD AUTO: 1.78 K/UL — SIGNIFICANT CHANGE UP (ref 1–3.3)
LYMPHOCYTES # BLD AUTO: 21.8 % — SIGNIFICANT CHANGE UP (ref 13–44)
MCHC RBC-ENTMCNC: 28.1 PG — SIGNIFICANT CHANGE UP (ref 27–34)
MCHC RBC-ENTMCNC: 29.9 % — LOW (ref 32–36)
MCV RBC AUTO: 94 FL — SIGNIFICANT CHANGE UP (ref 80–100)
MONOCYTES # BLD AUTO: 0.54 K/UL — SIGNIFICANT CHANGE UP (ref 0–0.9)
MONOCYTES NFR BLD AUTO: 6.6 % — SIGNIFICANT CHANGE UP (ref 2–14)
NEUTROPHILS # BLD AUTO: 5.56 K/UL — SIGNIFICANT CHANGE UP (ref 1.8–7.4)
NEUTROPHILS NFR BLD AUTO: 68.1 % — SIGNIFICANT CHANGE UP (ref 43–77)
NRBC # FLD: 0 — SIGNIFICANT CHANGE UP
PLATELET # BLD AUTO: 371 K/UL — SIGNIFICANT CHANGE UP (ref 150–400)
PMV BLD: 9.5 FL — SIGNIFICANT CHANGE UP (ref 7–13)
POTASSIUM SERPL-MCNC: 3.9 MMOL/L — SIGNIFICANT CHANGE UP (ref 3.5–5.3)
POTASSIUM SERPL-SCNC: 3.9 MMOL/L — SIGNIFICANT CHANGE UP (ref 3.5–5.3)
PROT SERPL-MCNC: 6.7 G/DL — SIGNIFICANT CHANGE UP (ref 6–8.3)
RBC # BLD: 2.85 M/UL — LOW (ref 3.8–5.2)
RBC # FLD: 16.2 % — HIGH (ref 10.3–14.5)
SODIUM SERPL-SCNC: 144 MMOL/L — SIGNIFICANT CHANGE UP (ref 135–145)
SPECIMEN SOURCE: SIGNIFICANT CHANGE UP
WBC # BLD: 8.16 K/UL — SIGNIFICANT CHANGE UP (ref 3.8–10.5)
WBC # FLD AUTO: 8.16 K/UL — SIGNIFICANT CHANGE UP (ref 3.8–10.5)

## 2017-12-04 PROCEDURE — 99232 SBSQ HOSP IP/OBS MODERATE 35: CPT

## 2017-12-04 PROCEDURE — 99233 SBSQ HOSP IP/OBS HIGH 50: CPT

## 2017-12-04 RX ADMIN — OXYCODONE HYDROCHLORIDE 10 MILLIGRAM(S): 5 TABLET ORAL at 21:18

## 2017-12-04 RX ADMIN — GABAPENTIN 300 MILLIGRAM(S): 400 CAPSULE ORAL at 18:27

## 2017-12-04 RX ADMIN — OXYCODONE HYDROCHLORIDE 10 MILLIGRAM(S): 5 TABLET ORAL at 11:43

## 2017-12-04 RX ADMIN — PIPERACILLIN AND TAZOBACTAM 25 GRAM(S): 4; .5 INJECTION, POWDER, LYOPHILIZED, FOR SOLUTION INTRAVENOUS at 05:03

## 2017-12-04 RX ADMIN — OXYCODONE HYDROCHLORIDE 10 MILLIGRAM(S): 5 TABLET ORAL at 20:39

## 2017-12-04 RX ADMIN — PIPERACILLIN AND TAZOBACTAM 25 GRAM(S): 4; .5 INJECTION, POWDER, LYOPHILIZED, FOR SOLUTION INTRAVENOUS at 21:18

## 2017-12-04 RX ADMIN — OXYCODONE HYDROCHLORIDE 10 MILLIGRAM(S): 5 TABLET ORAL at 12:13

## 2017-12-04 RX ADMIN — MONTELUKAST 10 MILLIGRAM(S): 4 TABLET, CHEWABLE ORAL at 11:43

## 2017-12-04 RX ADMIN — GABAPENTIN 300 MILLIGRAM(S): 400 CAPSULE ORAL at 05:03

## 2017-12-04 RX ADMIN — PIPERACILLIN AND TAZOBACTAM 25 GRAM(S): 4; .5 INJECTION, POWDER, LYOPHILIZED, FOR SOLUTION INTRAVENOUS at 14:21

## 2017-12-04 NOTE — PROGRESS NOTE ADULT - PROBLEM SELECTOR PLAN 2
- Pt spiking fever, with leukocytosis, and HR > 97 with fever, meet sepsis criteria.   - possible due to sigmoid colon abscess/contained perforation on CT   - c/w  zosyn, s/p IR percutaneous drainage catheter.   - so far Blood Cx/Urine Cx negative   fluid Cx grain stain + gram + rods. f/u Cx  f/u ID recs.

## 2017-12-04 NOTE — PROGRESS NOTE ADULT - PROBLEM SELECTOR PLAN 3
- s/p IR drainage, f/u fluid Cx fo I &S. Tolerated oral diet.   - surgery and IR follow up, continue zosyn. Leukocytosis improved, fever resolved.   - pain management.

## 2017-12-04 NOTE — PROGRESS NOTE ADULT - SUBJECTIVE AND OBJECTIVE BOX
B-TEAM SURGERY PROGRESS NOTE:   Pager: [phone]    Interim Events: Patient reports that she is tolerating a regular diet without nausea or vomiting. She has not yet had a bowel movement.     Physical Exam  Vital Signs Last 24 Hrs  T(C): 36.9 (04 Dec 2017 04:58), Max: 37.3 (03 Dec 2017 22:02)  T(F): 98.5 (04 Dec 2017 04:58), Max: 99.2 (03 Dec 2017 22:02)  HR: 60 (04 Dec 2017 04:58) (60 - 70)  BP: 127/59 (04 Dec 2017 04:58) (118/53 - 133/64)  BP(mean): --  RR: 16 (04 Dec 2017 04:58) (16 - 16)  SpO2: 100% (04 Dec 2017 04:58) (99% - 100%)    Gen: NAD  HEENT: normocephalic, atraumatic, no scleral icterus  CV: S1, S2, RRR  Pulm: CTA B/L  Abd: Soft, ND, mildly tender around drain site. Purulent drainage from IR catheter.     I&O's Detail    03 Dec 2017 07:01  -  04 Dec 2017 07:00  --------------------------------------------------------  IN:  Total IN: 0 mL    OUT:    Drain: 30 mL  Total OUT: 30 mL    Total NET: -30 mL          Labs:                        8.0    8.16  )-----------( 371      ( 04 Dec 2017 05:45 )             26.8     12-04    144  |  107  |  5<L>  ----------------------------<  83  3.9   |  26  |  0.49<L>    Ca    8.3<L>      04 Dec 2017 05:45    TPro  6.7  /  Alb  2.2<L>  /  TBili  0.6  /  DBili  x   /  AST  11  /  ALT  4   /  AlkPhos  51  12-04

## 2017-12-04 NOTE — PROGRESS NOTE ADULT - PROBLEM SELECTOR PLAN 5
(on Carboplatin and Taxol)  - Will continue neuropathy pain management with home oxycodone 10mg q6hr (ISTOP Reference #: [id number])  - f/u heme onc

## 2017-12-04 NOTE — PROGRESS NOTE ADULT - ASSESSMENT
73 yo woman with asthma, atrial fibrillation on Eliquis, stage 4 endometrial carcinoma, symptomatic anemia and fevers, found to have contained perforation of the sigmoid with a large fluid collection now s/p IR transabdominal drainage with 12F catheter. Patient's leukocytosis now improved, afebrile.     - c/w Zosyn  - Care of catheter per IR  - No acute surgical intervention  - Please call with additional questions    ASTER Segal MD PGY2  B-team surgery: [id number]

## 2017-12-04 NOTE — PROGRESS NOTE ADULT - SUBJECTIVE AND OBJECTIVE BOX
Subjective:  Patient with no anginal chest pain or shortness of breath    MEDICATIONS  (STANDING):  gabapentin 300 milliGRAM(s) Oral two times a day  montelukast 10 milliGRAM(s) Oral daily  piperacillin/tazobactam IVPB. 3.375 Gram(s) IV Intermittent every 8 hours  sodium chloride 0.9%. 1000 milliLiter(s) (75 mL/Hr) IV Continuous <Continuous>    LABS:                        8.0    8.16  )-----------( 371      ( 04 Dec 2017 05:45 )             26.8     144  |  107  |  5<L>  ----------------------------<  83  3.9   |  26  |  0.49<L>    Ca    8.3<L>      04 Dec 2017 05:45    TPro  6.7  /  Alb  2.2<L>  /  TBili  0.6  /  DBili  x   /  AST  11  /  ALT  4   /  AlkPhos  51  12-04    PHYSICAL EXAM  Vital Signs Last 24 Hrs  T(C): 37 (04 Dec 2017 08:58), Max: 37.3 (03 Dec 2017 22:02)  T(F): 98.6 (04 Dec 2017 08:58), Max: 99.2 (03 Dec 2017 22:02)  HR: 71 (04 Dec 2017 08:58) (60 - 71)  BP: 118/54 (04 Dec 2017 08:58) (118/53 - 127/59)  RR: 16 (04 Dec 2017 08:58) (16 - 16)  SpO2: 100% (04 Dec 2017 08:58) (100% - 100%)    Cardiovascular: Normal S1S2, RRR No JVD, 1/6 BELINDA, Peripheral pulses palpable 2+ B/L  Respiratory: Lungs clear to auscultation, normal effort  Gastrointestinal: Abdomen soft, ND, NT, +BS  Extremity no clubbing, cyanosis or edema B/L LE's       DIAGNOSTIC DATA    < from: CT Chest w/ IV Cont (12.01.17 @ 13:33) >  IMPRESSION:  Mural thickening of the sigmoid colon with adjacent air and fluid   collection, consistent with contained perforation.    Known endometrial cancer is difficult to delineate. Unchanged left   para-aortic lymph node.    Cavitary nodule in left upper lobe demonstrates further interval   cavitation.    Dr. Willson discussed the findings with NORMA Mccarty on December 1, 2017 at 3:57   PM.  Readback was obtained.      AMAURY BARRETT M.D., RADIOLOGY RESIDENT  This document has been electronically signed.  ALESSANDRO WILLSON M.D., ATTENDING RADIOLOGIST  This document has been electronically signed. Dec  1 2017  3:57PM    < end of copied text >    < from: CT Head No Cont (11.28.17 @ 19:35) >  IMPRESSION:   No CT evidence of acute intracranial hemorrhage, brain edema, or mass   effect. No displaced calvarial fracture.    AGUSTIN CONROY M.D., RADIOLOGY RESIDENT  This document has been electronically signed.  JIMBO BATISTA M.D., ATTENDING RADIOLOGIST  This document has been electronically signed. Nov 28 2017  8:07PM    < end of copied text >      ASSESSMENT/PLAN:   72 year old Female with hx of asthma, atrial fibrillation on Eliquis, with NL LV function on TTE 12/2016, with unremarkable plain TST 3/2016,  stage 4 endometrial carcinoma (on Carboplatin and Taxol) presenting with one day history of fatigue found to be markedly anemic, s/p 2 units PRBCs transfused, with increased confusion (negative CT head) and leukocytosis     --Eliquis on hold given profound anemia requiring transfusion   --Metoprolol on hold.  BP/HR currently stable  - HD stable- not in CHF  - found to have contained perforation of the sigmoid with a large fluid collection- will need to rule out pelvic collection as source of fever and leukocytosis. S/P  IR pelvic fluid drainage--f/u Fluid Cx  --on IV Abx    Lorena Bianchi PA-C  [facility]  [address], [address]   [place] [address]  office [phone]  pager [phone]

## 2017-12-04 NOTE — PROGRESS NOTE ADULT - ATTENDING COMMENTS
72 year old female with endometrial carcinoma (On Carboplatin/Taxol; last dose 11/7/17), A-Fib on Eliquis, Asthma presented with fatigue.     In the ED was found to have WBC of 17.63. Hemoglobin was 5.5. UA neg. RVP negative. Bld cxs negative.    Became febrile. CT Chest/Abdomen/Pelvis revealed mural thickening of the sigmoid colon with adjacent air and fluid collection, consistent with contained perforation.     s/p IR drainage with drain in place. cxs in progress.    Assessment:  -Sigmoid colon abscess s/p IR drainage 12/2      Recommend:  -Continue zosyn.  -F/U drainage cxs  -Monitor for fevers and trend WBC.    Jesús Choi MD  Pager [phone]  After 5pm/weekends call [phone]

## 2017-12-04 NOTE — PROGRESS NOTE ADULT - SUBJECTIVE AND OBJECTIVE BOX
Patient is a 72y old  Female who presents with a chief complaint of fatigue (29 Nov 2017 15:46)      SUBJECTIVE / OVERNIGHT EVENTS:      MEDICATIONS  (STANDING):  gabapentin 300 milliGRAM(s) Oral two times a day  montelukast 10 milliGRAM(s) Oral daily  piperacillin/tazobactam IVPB. 3.375 Gram(s) IV Intermittent every 8 hours  sodium chloride 0.9%. 1000 milliLiter(s) (75 mL/Hr) IV Continuous <Continuous>    MEDICATIONS  (PRN):  buDESOnide 160 MICROgram(s)/formoterol 4.5 MICROgram(s) Inhaler 2 Puff(s) Inhalation two times a day PRN shortness of breath  oxyCODONE    IR 10 milliGRAM(s) Oral every 6 hours PRN Severe Pain (7 - 10)      Vital Signs Last 24 Hrs  T(C): 36.9 (04 Dec 2017 04:58), Max: 37.3 (03 Dec 2017 22:02)  T(F): 98.5 (04 Dec 2017 04:58), Max: 99.2 (03 Dec 2017 22:02)  HR: 60 (04 Dec 2017 04:58) (60 - 70)  BP: 127/59 (04 Dec 2017 04:58) (118/53 - 133/64)  BP(mean): --  RR: 16 (04 Dec 2017 04:58) (16 - 16)  SpO2: 100% (04 Dec 2017 04:58) (99% - 100%)  CAPILLARY BLOOD GLUCOSE        I&O's Summary    03 Dec 2017 07:01  -  04 Dec 2017 07:00  --------------------------------------------------------  IN: 0 mL / OUT: 30 mL / NET: -30 mL        PHYSICAL EXAM:  GENERAL: NAD, well-developed  HEAD:  Atraumatic, Normocephalic  EYES: EOMI, PERRLA, conjunctiva and sclera clear  NECK: Supple, No JVD  CHEST/LUNG: Clear to auscultation bilaterally; No wheeze  HEART: Regular rate and rhythm; No murmurs, rubs, or gallops  ABDOMEN: Soft, Nontender, Nondistended; Bowel sounds present  EXTREMITIES:  2+ Peripheral Pulses, No clubbing, cyanosis, or edema  PSYCH: AAOx3  NEUROLOGY: non-focal  SKIN: No rashes or lesions    LABS:                        8.0    8.16  )-----------( 371      ( 04 Dec 2017 05:45 )             26.8     12-04    144  |  107  |  5<L>  ----------------------------<  83  3.9   |  26  |  0.49<L>    Ca    8.3<L>      04 Dec 2017 05:45    TPro  6.7  /  Alb  2.2<L>  /  TBili  0.6  /  DBili  x   /  AST  11  /  ALT  4   /  AlkPhos  51  12-04              RADIOLOGY & ADDITIONAL TESTS:    Imaging Personally Reviewed:    Consultant(s) Notes Reviewed:      Care Discussed with Consultants/Other Providers: NP Patient is a 72y old  Female who presents with a chief complaint of fatigue (29 Nov 2017 15:46)      SUBJECTIVE / OVERNIGHT EVENTS:  Patient c/o generalized weakness. She denies vaginal bleeding. Patient has no new complaints. Denies cp, SOB, abdominal pain, N/V/D.      MEDICATIONS  (STANDING):  gabapentin 300 milliGRAM(s) Oral two times a day  montelukast 10 milliGRAM(s) Oral daily  piperacillin/tazobactam IVPB. 3.375 Gram(s) IV Intermittent every 8 hours  sodium chloride 0.9%. 1000 milliLiter(s) (75 mL/Hr) IV Continuous <Continuous>    MEDICATIONS  (PRN):  buDESOnide 160 MICROgram(s)/formoterol 4.5 MICROgram(s) Inhaler 2 Puff(s) Inhalation two times a day PRN shortness of breath  oxyCODONE    IR 10 milliGRAM(s) Oral every 6 hours PRN Severe Pain (7 - 10)      Vital Signs Last 24 Hrs  T(C): 36.9 (04 Dec 2017 04:58), Max: 37.3 (03 Dec 2017 22:02)  T(F): 98.5 (04 Dec 2017 04:58), Max: 99.2 (03 Dec 2017 22:02)  HR: 60 (04 Dec 2017 04:58) (60 - 70)  BP: 127/59 (04 Dec 2017 04:58) (118/53 - 133/64)  BP(mean): --  RR: 16 (04 Dec 2017 04:58) (16 - 16)  SpO2: 100% (04 Dec 2017 04:58) (99% - 100%)  CAPILLARY BLOOD GLUCOSE        I&O's Summary    03 Dec 2017 07:01  -  04 Dec 2017 07:00  --------------------------------------------------------  IN: 0 mL / OUT: 30 mL / NET: -30 mL        PHYSICAL EXAM:  GENERAL: NAD, well-developed  HEAD:  Atraumatic, Normocephalic  EYES: EOMI, PERRLA, conjunctiva and sclera clear  NECK: Supple, No JVD  CHEST/LUNG: Clear to auscultation bilaterally; No wheeze  HEART: Regular rate and rhythm; No murmurs, rubs, or gallops  ABDOMEN: Soft, Nontender, Nondistended; Bowel sounds present  EXTREMITIES:  2+ Peripheral Pulses, No clubbing, cyanosis, or edema  PSYCH: AAOx3  NEUROLOGY: non-focal  SKIN: No rashes or lesions    LABS:                        8.0    8.16  )-----------( 371      ( 04 Dec 2017 05:45 )             26.8     12-04    144  |  107  |  5<L>  ----------------------------<  83  3.9   |  26  |  0.49<L>    Ca    8.3<L>      04 Dec 2017 05:45    TPro  6.7  /  Alb  2.2<L>  /  TBili  0.6  /  DBili  x   /  AST  11  /  ALT  4   /  AlkPhos  51  12-04              RADIOLOGY & ADDITIONAL TESTS:    Imaging Personally Reviewed:    Consultant(s) Notes Reviewed:      Care Discussed with Consultants/Other Providers: NP Patient is a 72y old  Female who presents with a chief complaint of fatigue (29 Nov 2017 15:46)      SUBJECTIVE / OVERNIGHT EVENTS:  Patient c/o generalized weakness. She denies vaginal bleeding. Patient has no new complaints. Denies cp, SOB, abdominal pain, N/V/D.      MEDICATIONS  (STANDING):  gabapentin 300 milliGRAM(s) Oral two times a day  montelukast 10 milliGRAM(s) Oral daily  piperacillin/tazobactam IVPB. 3.375 Gram(s) IV Intermittent every 8 hours  sodium chloride 0.9%. 1000 milliLiter(s) (75 mL/Hr) IV Continuous <Continuous>    MEDICATIONS  (PRN):  buDESOnide 160 MICROgram(s)/formoterol 4.5 MICROgram(s) Inhaler 2 Puff(s) Inhalation two times a day PRN shortness of breath  oxyCODONE    IR 10 milliGRAM(s) Oral every 6 hours PRN Severe Pain (7 - 10)      Vital Signs Last 24 Hrs  T(C): 36.9 (04 Dec 2017 04:58), Max: 37.3 (03 Dec 2017 22:02)  T(F): 98.5 (04 Dec 2017 04:58), Max: 99.2 (03 Dec 2017 22:02)  HR: 60 (04 Dec 2017 04:58) (60 - 70)  BP: 127/59 (04 Dec 2017 04:58) (118/53 - 133/64)  BP(mean): --  RR: 16 (04 Dec 2017 04:58) (16 - 16)  SpO2: 100% (04 Dec 2017 04:58) (99% - 100%)  CAPILLARY BLOOD GLUCOSE        I&O's Summary    03 Dec 2017 07:01  -  04 Dec 2017 07:00  --------------------------------------------------------  IN: 0 mL / OUT: 30 mL / NET: -30 mL        PHYSICAL EXAM:  GENERAL: NAD, well-developed  HEAD:  Atraumatic, Normocephalic  EYES: EOMI, PERRLA, conjunctiva and sclera clear  NECK: Supple, No JVD  CHEST/LUNG: Clear to auscultation bilaterally; No wheeze  HEART: Regular rate and rhythm; No murmurs, rubs, or gallops  ABDOMEN: Pigtail catheter. minimal drainage. Soft, Nontender, Nondistended; Bowel sounds present  EXTREMITIES:  2+ Peripheral Pulses, No clubbing, cyanosis, or edema  PSYCH: AAOx3  NEUROLOGY: non-focal  SKIN: No rashes or lesions    LABS:                        8.0    8.16  )-----------( 371      ( 04 Dec 2017 05:45 )             26.8     12-04    144  |  107  |  5<L>  ----------------------------<  83  3.9   |  26  |  0.49<L>    Ca    8.3<L>      04 Dec 2017 05:45    TPro  6.7  /  Alb  2.2<L>  /  TBili  0.6  /  DBili  x   /  AST  11  /  ALT  4   /  AlkPhos  51  12-04              RADIOLOGY & ADDITIONAL TESTS:    Imaging Personally Reviewed:    Consultant(s) Notes Reviewed:      Care Discussed with Consultants/Other Providers: NP

## 2017-12-04 NOTE — PROGRESS NOTE ADULT - SUBJECTIVE AND OBJECTIVE BOX
CC: F/U sigmoid colon abscess    Inverval History/ROS: Patient s/p IR drainage on 12/2. Drainage cx in progress. Patient feels well. Has chronic pain in her feet after chemo. No other complaints. Denies N/V/D/C, fever, chills, chest pain, sob, cough.    Allergies  No Known Allergies        ANTIMICROBIALS:  piperacillin/tazobactam IVPB. 3.375 every 8 hours      OTHER MEDS:  buDESOnide 160 MICROgram(s)/formoterol 4.5 MICROgram(s) Inhaler 2 Puff(s) Inhalation two times a day PRN  gabapentin 300 milliGRAM(s) Oral two times a day  montelukast 10 milliGRAM(s) Oral daily  oxyCODONE    IR 10 milliGRAM(s) Oral every 6 hours PRN  sodium chloride 0.9%. 1000 milliLiter(s) IV Continuous <Continuous>      PE:    Vital Signs Last 24 Hrs  T(C): 37 (04 Dec 2017 08:58), Max: 37.3 (03 Dec 2017 22:02)  T(F): 98.6 (04 Dec 2017 08:58), Max: 99.2 (03 Dec 2017 22:02)  HR: 71 (04 Dec 2017 08:58) (60 - 71)  BP: 118/54 (04 Dec 2017 08:58) (118/53 - 127/59)  BP(mean): --  RR: 16 (04 Dec 2017 08:58) (16 - 16)  SpO2: 100% (04 Dec 2017 08:58) (100% - 100%)    Gen: AOx3, NAD, non-toxic, pleasant  CV: S1+S2 normal, no murmurs  Resp: Clear bilat, no resp distress  Abd: Soft, nontender, +BS  Ext: No LE edema, no wounds  : No Feng  IV/Skin: No thrombophlebitis, +drain in place with seropurulent drainge.   Neuro: no focal deficits.    LABS:                          8.0    8.16  )-----------( 371      ( 04 Dec 2017 05:45 )             26.8       12-04    144  |  107  |  5<L>  ----------------------------<  83  3.9   |  26  |  0.49<L>    Ca    8.3<L>      04 Dec 2017 05:45    TPro  6.7  /  Alb  2.2<L>  /  TBili  0.6  /  DBili  x   /  AST  11  /  ALT  4   /  AlkPhos  51  12-04    MICROBIOLOGY:  v  DRAINAGE  12-02-17 --  --    GPCPR^Gram Pos Cocci in Pairs  QUANTITY OF BACTERIA SEEN: MODERATE (3+)  GPR^Gram Positive Rods  QUANTITY OF BACTERIA SEEN: MODERATE (3+)  WBC^White Blood Cells  QNTY CELLS IN GRAM STAIN: MANY (4+)      BLOOD PERIPHERAL  12-01-17 --  --  --      URINE MIDSTREAM  11-30-17 --  --  --      BLOOD PERIPHERAL  11-30-17 --  --  --      RADIOLOGY:    No new images.

## 2017-12-04 NOTE — PROGRESS NOTE ADULT - SUBJECTIVE AND OBJECTIVE BOX
ANESTHESIA POSTOP CHECK    72y Female POSTOP DAY 1 S/P   [x ] General Anesthesia  [ ] Ad Anesthesia  [ ] MAC    Vital Signs Last 24 Hrs  T(C): 36.9 (04 Dec 2017 04:58), Max: 37.3 (03 Dec 2017 22:02)  T(F): 98.5 (04 Dec 2017 04:58), Max: 99.2 (03 Dec 2017 22:02)  HR: 60 (04 Dec 2017 04:58) (60 - 70)  BP: 127/59 (04 Dec 2017 04:58) (118/53 - 133/64)  BP(mean): --  RR: 16 (04 Dec 2017 04:58) (16 - 16)  SpO2: 100% (04 Dec 2017 04:58) (99% - 100%)  I&O's Summary    03 Dec 2017 07:01  -  04 Dec 2017 07:00  --------------------------------------------------------  IN: 0 mL / OUT: 30 mL / NET: -30 mL        [x ] NO APPARENT ANESTHESIA COMPLICATIONS      Comments:

## 2017-12-04 NOTE — PROGRESS NOTE ADULT - ATTENDING COMMENTS
Patient seen and examined.  Agree with above.   -AC on hold for anemia    Giovanny Hood MD  [facility]  [address], [address]  [place] [address]  office: [phone]  pager: [phone]

## 2017-12-05 LAB
ALBUMIN SERPL ELPH-MCNC: 2.3 G/DL — LOW (ref 3.3–5)
ALP SERPL-CCNC: 52 U/L — SIGNIFICANT CHANGE UP (ref 40–120)
ALT FLD-CCNC: 6 U/L — SIGNIFICANT CHANGE UP (ref 4–33)
AST SERPL-CCNC: 12 U/L — SIGNIFICANT CHANGE UP (ref 4–32)
BACTERIA BLD CULT: SIGNIFICANT CHANGE UP
BACTERIA BLD CULT: SIGNIFICANT CHANGE UP
BASOPHILS # BLD AUTO: 0.04 K/UL — SIGNIFICANT CHANGE UP (ref 0–0.2)
BASOPHILS NFR BLD AUTO: 0.4 % — SIGNIFICANT CHANGE UP (ref 0–2)
BILIRUB SERPL-MCNC: 0.4 MG/DL — SIGNIFICANT CHANGE UP (ref 0.2–1.2)
BUN SERPL-MCNC: 4 MG/DL — LOW (ref 7–23)
CALCIUM SERPL-MCNC: 8.4 MG/DL — SIGNIFICANT CHANGE UP (ref 8.4–10.5)
CHLORIDE SERPL-SCNC: 102 MMOL/L — SIGNIFICANT CHANGE UP (ref 98–107)
CO2 SERPL-SCNC: 28 MMOL/L — SIGNIFICANT CHANGE UP (ref 22–31)
CREAT SERPL-MCNC: 0.5 MG/DL — SIGNIFICANT CHANGE UP (ref 0.5–1.3)
EOSINOPHIL # BLD AUTO: 0.2 K/UL — SIGNIFICANT CHANGE UP (ref 0–0.5)
EOSINOPHIL NFR BLD AUTO: 2.2 % — SIGNIFICANT CHANGE UP (ref 0–6)
FOLATE SERPL-MCNC: 5.9 NG/ML — SIGNIFICANT CHANGE UP (ref 4.7–20)
GLUCOSE SERPL-MCNC: 115 MG/DL — HIGH (ref 70–99)
GRAM STN FLD: SIGNIFICANT CHANGE UP
HCT VFR BLD CALC: 28 % — LOW (ref 34.5–45)
HGB BLD-MCNC: 8.8 G/DL — LOW (ref 11.5–15.5)
IMM GRANULOCYTES # BLD AUTO: 0.07 # — SIGNIFICANT CHANGE UP
IMM GRANULOCYTES NFR BLD AUTO: 0.8 % — SIGNIFICANT CHANGE UP (ref 0–1.5)
LYMPHOCYTES # BLD AUTO: 1.73 K/UL — SIGNIFICANT CHANGE UP (ref 1–3.3)
LYMPHOCYTES # BLD AUTO: 18.7 % — SIGNIFICANT CHANGE UP (ref 13–44)
MAGNESIUM SERPL-MCNC: 1 MG/DL — CRITICAL LOW (ref 1.6–2.6)
MCHC RBC-ENTMCNC: 29.2 PG — SIGNIFICANT CHANGE UP (ref 27–34)
MCHC RBC-ENTMCNC: 31.4 % — LOW (ref 32–36)
MCV RBC AUTO: 93 FL — SIGNIFICANT CHANGE UP (ref 80–100)
METHOD TYPE: SIGNIFICANT CHANGE UP
MONOCYTES # BLD AUTO: 0.7 K/UL — SIGNIFICANT CHANGE UP (ref 0–0.9)
MONOCYTES NFR BLD AUTO: 7.6 % — SIGNIFICANT CHANGE UP (ref 2–14)
NEUTROPHILS # BLD AUTO: 6.52 K/UL — SIGNIFICANT CHANGE UP (ref 1.8–7.4)
NEUTROPHILS NFR BLD AUTO: 70.3 % — SIGNIFICANT CHANGE UP (ref 43–77)
NRBC # FLD: 0 — SIGNIFICANT CHANGE UP
ORGANISM # SPEC MICROSCOPIC CNT: SIGNIFICANT CHANGE UP
PHOSPHATE SERPL-MCNC: 2.9 MG/DL — SIGNIFICANT CHANGE UP (ref 2.5–4.5)
PLATELET # BLD AUTO: 371 K/UL — SIGNIFICANT CHANGE UP (ref 150–400)
PMV BLD: 9.1 FL — SIGNIFICANT CHANGE UP (ref 7–13)
POTASSIUM SERPL-MCNC: 3.6 MMOL/L — SIGNIFICANT CHANGE UP (ref 3.5–5.3)
POTASSIUM SERPL-SCNC: 3.6 MMOL/L — SIGNIFICANT CHANGE UP (ref 3.5–5.3)
PROT SERPL-MCNC: 7 G/DL — SIGNIFICANT CHANGE UP (ref 6–8.3)
RBC # BLD: 3.01 M/UL — LOW (ref 3.8–5.2)
RBC # FLD: 16.1 % — HIGH (ref 10.3–14.5)
SODIUM SERPL-SCNC: 141 MMOL/L — SIGNIFICANT CHANGE UP (ref 135–145)
WBC # BLD: 9.26 K/UL — SIGNIFICANT CHANGE UP (ref 3.8–10.5)
WBC # FLD AUTO: 9.26 K/UL — SIGNIFICANT CHANGE UP (ref 3.8–10.5)

## 2017-12-05 PROCEDURE — 99233 SBSQ HOSP IP/OBS HIGH 50: CPT

## 2017-12-05 PROCEDURE — 99232 SBSQ HOSP IP/OBS MODERATE 35: CPT

## 2017-12-05 RX ORDER — GABAPENTIN 400 MG/1
400 CAPSULE ORAL THREE TIMES A DAY
Qty: 0 | Refills: 0 | Status: DISCONTINUED | OUTPATIENT
Start: 2017-12-05 | End: 2017-12-05

## 2017-12-05 RX ORDER — GABAPENTIN 400 MG/1
600 CAPSULE ORAL AT BEDTIME
Qty: 0 | Refills: 0 | Status: DISCONTINUED | OUTPATIENT
Start: 2017-12-05 | End: 2017-12-06

## 2017-12-05 RX ORDER — MAGNESIUM SULFATE 500 MG/ML
2 VIAL (ML) INJECTION ONCE
Qty: 0 | Refills: 0 | Status: COMPLETED | OUTPATIENT
Start: 2017-12-05 | End: 2017-12-05

## 2017-12-05 RX ADMIN — OXYCODONE HYDROCHLORIDE 10 MILLIGRAM(S): 5 TABLET ORAL at 08:41

## 2017-12-05 RX ADMIN — PIPERACILLIN AND TAZOBACTAM 25 GRAM(S): 4; .5 INJECTION, POWDER, LYOPHILIZED, FOR SOLUTION INTRAVENOUS at 22:52

## 2017-12-05 RX ADMIN — GABAPENTIN 600 MILLIGRAM(S): 400 CAPSULE ORAL at 22:52

## 2017-12-05 RX ADMIN — OXYCODONE HYDROCHLORIDE 10 MILLIGRAM(S): 5 TABLET ORAL at 09:20

## 2017-12-05 RX ADMIN — PIPERACILLIN AND TAZOBACTAM 25 GRAM(S): 4; .5 INJECTION, POWDER, LYOPHILIZED, FOR SOLUTION INTRAVENOUS at 05:07

## 2017-12-05 RX ADMIN — OXYCODONE HYDROCHLORIDE 10 MILLIGRAM(S): 5 TABLET ORAL at 17:49

## 2017-12-05 RX ADMIN — Medication 50 GRAM(S): at 10:12

## 2017-12-05 RX ADMIN — OXYCODONE HYDROCHLORIDE 10 MILLIGRAM(S): 5 TABLET ORAL at 18:20

## 2017-12-05 RX ADMIN — MONTELUKAST 10 MILLIGRAM(S): 4 TABLET, CHEWABLE ORAL at 13:54

## 2017-12-05 RX ADMIN — PIPERACILLIN AND TAZOBACTAM 25 GRAM(S): 4; .5 INJECTION, POWDER, LYOPHILIZED, FOR SOLUTION INTRAVENOUS at 13:54

## 2017-12-05 RX ADMIN — GABAPENTIN 300 MILLIGRAM(S): 400 CAPSULE ORAL at 05:07

## 2017-12-05 NOTE — DIETITIAN INITIAL EVALUATION ADULT. - PROBLEM SELECTOR PLAN 4
[id number] Score 5  - No issues presently  - Will hold off on eliquis in setting of anemia   - Will hold rate control with metoprolol in setting of anemia, do not want to mask reflex tachycardia from hypotension  - Suggest restarting metoprolol when anemia improves

## 2017-12-05 NOTE — DIETITIAN INITIAL EVALUATION ADULT. - ENERGY NEEDS
Pt's height: 60"         IBW: 100#+/-10%  Pt's stated current body weight (~100#) not consistent with dosing weight: ~94.7# (11/29). ??Question about accuracy of stated or recorded weights??

## 2017-12-05 NOTE — PROGRESS NOTE ADULT - PROBLEM SELECTOR PLAN 2
- Pt was spiking fever, with leukocytosis, and HR > 97 with fever, meet sepsis criteria. sepsis resolving.  - possible due to sigmoid colon abscess/contained perforation on CT   - c/w  zosyn, s/p IR percutaneous drainage catheter.   - so far Blood Cx/Urine Cx negative   fluid Cx grain stain + gram + rods and streptococcus. f/u Cx  f/u ID recs. - Pt was spiking fever, with leukocytosis, and HR > 97 with fever, meet sepsis criteria. sepsis resolving.  - possible due to sigmoid colon abscess/contained perforation on CT   - c/w  zosyn, s/p IR percutaneous drainage catheter.   - so far Blood Cx/Urine Cx negative   fluid Cx grew pansensitive streptococcus constellatus  f/u ID recs.

## 2017-12-05 NOTE — DIETITIAN INITIAL EVALUATION ADULT. - PROBLEM SELECTOR PLAN 3
as per family, patient has been more forgetful this week which can be due to symptomatic anemia vs infectious process. Patient is currently AAOx4  - Will rule out infection as mentioned above  - Fall and aspiration precautions.   - PT consulted  - F/U TSH level and Vit D levels

## 2017-12-05 NOTE — PROGRESS NOTE ADULT - PROBLEM SELECTOR PLAN 3
- s/p IR drainage, f/u fluid Cx fo I &S. Tolerated oral diet.   - surgery and IR follow up, continue zosyn. Leukocytosis resolved, fever resolved.   - pain management. - s/p IR drainage,  Tolerated oral diet.   - surgery and IR follow up of wound dressing. continue zosyn. Leukocytosis resolved, fever resolved.   - pain management.

## 2017-12-05 NOTE — DIETITIAN INITIAL EVALUATION ADULT. - PROBLEM SELECTOR PLAN 5
(on Carboplatin and Taxol)  - Will continue neuropathy pain management with home oxycodone 10mg q6hr (ISTOP Reference #: [id number])  - Will consult house Heme/Onc in AM, was due to chemo today but will hold off for now

## 2017-12-05 NOTE — DIETITIAN INITIAL EVALUATION ADULT. - PROBLEM SELECTOR PLAN 2
may be secondary to underlying infectious process  - Will monitor off antibiotics for now   - UA, CXR final read pending   - If patient becomes febrile, will obtain blood cultures as patient is immunocompromised

## 2017-12-05 NOTE — DIETITIAN INITIAL EVALUATION ADULT. - NS AS NUTRI INTERV MEDICAL AND FOOD SUPPLEMENTS
Ensure Enlive 8oz. 3x daily (will provide additional ~1050 Kcal, ~60 gm Protein);/Commercial beverage

## 2017-12-05 NOTE — PROGRESS NOTE ADULT - SUBJECTIVE AND OBJECTIVE BOX
Patient is a 72y old  Female who presents with a chief complaint of fatigue (29 Nov 2017 15:46)      SUBJECTIVE / OVERNIGHT EVENTS:  Patient has no new complaints. Denies cp, SOB, abdominal pain, N/V/D.    MEDICATIONS  (STANDING):  gabapentin 300 milliGRAM(s) Oral two times a day  magnesium sulfate  IVPB 2 Gram(s) IV Intermittent once  montelukast 10 milliGRAM(s) Oral daily  piperacillin/tazobactam IVPB. 3.375 Gram(s) IV Intermittent every 8 hours  sodium chloride 0.9%. 1000 milliLiter(s) (75 mL/Hr) IV Continuous <Continuous>    MEDICATIONS  (PRN):  buDESOnide 160 MICROgram(s)/formoterol 4.5 MICROgram(s) Inhaler 2 Puff(s) Inhalation two times a day PRN shortness of breath  oxyCODONE    IR 10 milliGRAM(s) Oral every 6 hours PRN Severe Pain (7 - 10)      Vital Signs Last 24 Hrs  T(C): 37.1 (05 Dec 2017 05:06), Max: 37.1 (05 Dec 2017 05:06)  T(F): 98.7 (05 Dec 2017 05:06), Max: 98.7 (05 Dec 2017 05:06)  HR: 61 (05 Dec 2017 05:06) (61 - 64)  BP: 129/53 (05 Dec 2017 05:06) (129/53 - 136/63)  BP(mean): --  RR: 16 (05 Dec 2017 05:06) (16 - 17)  SpO2: 100% (05 Dec 2017 05:06) (100% - 100%)  CAPILLARY BLOOD GLUCOSE        I&O's Summary    04 Dec 2017 07:01  -  05 Dec 2017 07:00  --------------------------------------------------------  IN: 0 mL / OUT: 30 mL / NET: -30 mL        PHYSICAL EXAM:  GENERAL: NAD, well-developed  HEAD:  Atraumatic, Normocephalic  EYES: EOMI, PERRLA, conjunctiva and sclera clear  NECK: Supple, No JVD  CHEST/LUNG: Clear to auscultation bilaterally; No wheeze  HEART: Regular rate and rhythm; No murmurs, rubs, or gallops  ABDOMEN: Soft, Nontender, Nondistended; Bowel sounds present  EXTREMITIES:  2+ Peripheral Pulses, No clubbing, cyanosis, or edema  PSYCH: AAOx3  NEUROLOGY: non-focal  SKIN: No rashes or lesions    LABS:                        8.8    9.26  )-----------( 371      ( 05 Dec 2017 06:23 )             28.0     12-05    141  |  102  |  4<L>  ----------------------------<  115<H>  3.6   |  28  |  0.50    Ca    8.4      05 Dec 2017 06:23  Phos  2.9     12-05  Mg     1.0     12-05    TPro  7.0  /  Alb  2.3<L>  /  TBili  0.4  /  DBili  x   /  AST  12  /  ALT  6   /  AlkPhos  52  12-05              RADIOLOGY & ADDITIONAL TESTS:    Imaging Personally Reviewed:    Consultant(s) Notes Reviewed:      Care Discussed with Consultants/Other Providers: NP Patient is a 72y old  Female who presents with a chief complaint of fatigue (29 Nov 2017 15:46)      SUBJECTIVE / OVERNIGHT EVENTS:  Patient has no new complaints. Still complaining of B/L LE neuropathy and dressings from abdominal drain not being changed. Denies cp, SOB, abdominal pain, N/V/D.    MEDICATIONS  (STANDING):  gabapentin 300 milliGRAM(s) Oral two times a day  magnesium sulfate  IVPB 2 Gram(s) IV Intermittent once  montelukast 10 milliGRAM(s) Oral daily  piperacillin/tazobactam IVPB. 3.375 Gram(s) IV Intermittent every 8 hours  sodium chloride 0.9%. 1000 milliLiter(s) (75 mL/Hr) IV Continuous <Continuous>    MEDICATIONS  (PRN):  buDESOnide 160 MICROgram(s)/formoterol 4.5 MICROgram(s) Inhaler 2 Puff(s) Inhalation two times a day PRN shortness of breath  oxyCODONE    IR 10 milliGRAM(s) Oral every 6 hours PRN Severe Pain (7 - 10)      Vital Signs Last 24 Hrs  T(C): 37.1 (05 Dec 2017 05:06), Max: 37.1 (05 Dec 2017 05:06)  T(F): 98.7 (05 Dec 2017 05:06), Max: 98.7 (05 Dec 2017 05:06)  HR: 61 (05 Dec 2017 05:06) (61 - 64)  BP: 129/53 (05 Dec 2017 05:06) (129/53 - 136/63)  BP(mean): --  RR: 16 (05 Dec 2017 05:06) (16 - 17)  SpO2: 100% (05 Dec 2017 05:06) (100% - 100%)  CAPILLARY BLOOD GLUCOSE        I&O's Summary    04 Dec 2017 07:01  -  05 Dec 2017 07:00  --------------------------------------------------------  IN: 0 mL / OUT: 30 mL / NET: -30 mL        PHYSICAL EXAM:  GENERAL: NAD, well-developed  HEAD:  Atraumatic, Normocephalic  EYES: EOMI, PERRLA, conjunctiva and sclera clear  NECK: Supple, No JVD  CHEST/LUNG: Clear to auscultation bilaterally; No wheeze  HEART: Regular rate and rhythm; No murmurs, rubs, or gallops  ABDOMEN: abdominal drain draining white pus. Soft, Nontender, Nondistended; Bowel sounds present  EXTREMITIES:  2+ Peripheral Pulses, No clubbing, cyanosis, or edema  PSYCH: AAOx3  NEUROLOGY: non-focal  SKIN: No rashes or lesions    LABS:                        8.8    9.26  )-----------( 371      ( 05 Dec 2017 06:23 )             28.0     12-05    141  |  102  |  4<L>  ----------------------------<  115<H>  3.6   |  28  |  0.50    Ca    8.4      05 Dec 2017 06:23  Phos  2.9     12-05  Mg     1.0     12-05    TPro  7.0  /  Alb  2.3<L>  /  TBili  0.4  /  DBili  x   /  AST  12  /  ALT  6   /  AlkPhos  52  12-05              RADIOLOGY & ADDITIONAL TESTS:    Imaging Personally Reviewed:    Consultant(s) Notes Reviewed:      Care Discussed with Consultants/Other Providers: NP

## 2017-12-05 NOTE — PROGRESS NOTE ADULT - ATTENDING COMMENTS
Agree with above.   -AC on hold for anemia  -monitor HR/BP off beta blockers    Giovanny Hood MD  [facility]  [address], [address]  [place] [address]  office: [phone]  pager: [phone]

## 2017-12-05 NOTE — PROGRESS NOTE ADULT - PROBLEM SELECTOR PLAN 5
(on Carboplatin and Taxol)  - Will continue neuropathy pain management with home oxycodone 10mg q6hr (ISTOP Reference #: [id number])  - f/u heme onc (on Carboplatin and Taxol)  - Will continue neuropathy pain management with home oxycodone 10mg q6hr restarted neurontin 600mg qHs.

## 2017-12-05 NOTE — PROGRESS NOTE ADULT - SUBJECTIVE AND OBJECTIVE BOX
Subjective:  Patient with no anginal chest pain or shortness of breath    MEDICATIONS  (STANDING):  gabapentin 400 milliGRAM(s) Oral three times a day  montelukast 10 milliGRAM(s) Oral daily  piperacillin/tazobactam IVPB. 3.375 Gram(s) IV Intermittent every 8 hours  sodium chloride 0.9%. 1000 milliLiter(s) (75 mL/Hr) IV Continuous <Continuous>    LABS:                        8.8    9.26  )-----------( 371      ( 05 Dec 2017 06:23 )             28.0     141  |  102  |  4<L>  ----------------------------<  115<H>  3.6   |  28  |  0.50    Ca    8.4      05 Dec 2017 06:23  Phos  2.9     12-05  Mg     1.0     12-05    TPro  7.0  /  Alb  2.3<L>  /  TBili  0.4  /  DBili  x   /  AST  12  /  ALT  6   /  AlkPhos  52  12-05     PHYSICAL EXAM  Vital Signs Last 24 Hrs  T(C): 37.1 (05 Dec 2017 05:06), Max: 37.1 (05 Dec 2017 05:06)  T(F): 98.7 (05 Dec 2017 05:06), Max: 98.7 (05 Dec 2017 05:06)  HR: 61 (05 Dec 2017 05:06) (61 - 64)  BP: 129/53 (05 Dec 2017 05:06) (129/53 - 136/63)  RR: 16 (05 Dec 2017 05:06) (16 - 17)  SpO2: 100% (05 Dec 2017 05:06) (100% - 100%)    Cardiovascular: Normal S1S2, RRR No JVD, 1/6 BELINDA, Peripheral pulses palpable 2+ B/L  Respiratory: Lungs clear to auscultation, normal effort  Gastrointestinal: Abdomen soft, ND, NT, +BS  Extremity no clubbing, cyanosis or edema B/L LE's     DIAGNOSTIC DATA    < from: CT Chest w/ IV Cont (12.01.17 @ 13:33) >  IMPRESSION:  Mural thickening of the sigmoid colon with adjacent air and fluid   collection, consistent with contained perforation.    Known endometrial cancer is difficult to delineate. Unchanged left   para-aortic lymph node.    Cavitary nodule in left upper lobe demonstrates further interval   cavitation.    Dr. Willson discussed the findings with NORMA Mccarty on December 1, 2017 at 3:57   PM.  Readback was obtained.      AMAURY BARRETT M.D., RADIOLOGY RESIDENT  This document has been electronically signed.  ALESSANDRO WILLSON M.D., ATTENDING RADIOLOGIST  This document has been electronically signed. Dec  1 2017  3:57PM    < end of copied text >    < from: CT Head No Cont (11.28.17 @ 19:35) >  IMPRESSION:   No CT evidence of acute intracranial hemorrhage, brain edema, or mass   effect. No displaced calvarial fracture.    AGUSTIN CONROY M.D., RADIOLOGY RESIDENT  This document has been electronically signed.  JIMBO BATISTA M.D., ATTENDING RADIOLOGIST  This document has been electronically signed. Nov 28 2017  8:07PM    < end of copied text >      ASSESSMENT/PLAN:   72 year old Female with hx of asthma, atrial fibrillation on Eliquis, with NL LV function on TTE 12/2016, with unremarkable plain TST 3/2016,  stage 4 endometrial carcinoma (on Carboplatin and Taxol) presenting with one day history of fatigue found to be markedly anemic, s/p 2 units PRBCs transfused, with increased confusion (negative CT head) and leukocytosis     --Eliquis on hold given profound anemia requiring transfusion   --Metoprolol on hold.  BP/HR currently stable  - HD stable- not in CHF  - found to have contained perforation of the sigmoid with a large fluid collection- will need to rule out pelvic collection as source of fever and leukocytosis. S/P  IR pelvic fluid drainage--f/u Fluid Cx  --on IV Abx    Lorena Bianchi PA-C  [facility]  [address], [address]   [place] [address]  office [phone]  pager [phone]

## 2017-12-05 NOTE — PROGRESS NOTE ADULT - SUBJECTIVE AND OBJECTIVE BOX
CC: F/U sigmoid colon abscess s/p IR drainage    Inverval History/ROS: Patient s/p IR drainage on 12/2. Drainage cx in progress. Patient feels well. No complaints today. Denies N/V/D/C, fever, chills, chest pain, sob, cough.    Allergies  No Known Allergies        ANTIMICROBIALS:  piperacillin/tazobactam IVPB. 3.375 every 8 hours      OTHER MEDS:  buDESOnide 160 MICROgram(s)/formoterol 4.5 MICROgram(s) Inhaler 2 Puff(s) Inhalation two times a day PRN  gabapentin 400 milliGRAM(s) Oral three times a day  montelukast 10 milliGRAM(s) Oral daily  oxyCODONE    IR 10 milliGRAM(s) Oral every 6 hours PRN  sodium chloride 0.9%. 1000 milliLiter(s) IV Continuous <Continuous>      PE:    Vital Signs Last 24 Hrs  T(C): 37.1 (05 Dec 2017 05:06), Max: 37.1 (05 Dec 2017 05:06)  T(F): 98.7 (05 Dec 2017 05:06), Max: 98.7 (05 Dec 2017 05:06)  HR: 61 (05 Dec 2017 05:06) (61 - 64)  BP: 129/53 (05 Dec 2017 05:06) (129/53 - 136/63)  BP(mean): --  RR: 16 (05 Dec 2017 05:06) (16 - 17)  SpO2: 100% (05 Dec 2017 05:06) (100% - 100%)    Gen: AOx3, NAD, non-toxic, pleasant  CV: S1+S2 normal, no murmurs  Resp: Clear bilat, no resp distress  Abd: Soft, nontender, +BS  Ext: No LE edema, no wounds  : No Feng  IV/Skin: No thrombophlebitis, +drain in place  Neuro: no focal deficits.      LABS:                          8.8    9.26  )-----------( 371      ( 05 Dec 2017 06:23 )             28.0       12-05    141  |  102  |  4<L>  ----------------------------<  115<H>  3.6   |  28  |  0.50    Ca    8.4      05 Dec 2017 06:23  Phos  2.9     12-05  Mg     1.0     12-05    TPro  7.0  /  Alb  2.3<L>  /  TBili  0.4  /  DBili  x   /  AST  12  /  ALT  6   /  AlkPhos  52  12-05      MICROBIOLOGY:  v  DRAINAGE  12-02-17 --  --    GPCPR^Gram Pos Cocci in Pairs  QUANTITY OF BACTERIA SEEN: MODERATE (3+)  GPR^Gram Positive Rods  QUANTITY OF BACTERIA SEEN: MODERATE (3+)  WBC^White Blood Cells  QNTY CELLS IN GRAM STAIN: MANY (4+)      BLOOD PERIPHERAL  12-01-17 --  --  --      URINE MIDSTREAM  11-30-17 --  --  --      BLOOD PERIPHERAL  11-30-17 --  --  --      RADIOLOGY:  No new images.

## 2017-12-05 NOTE — DIETITIAN INITIAL EVALUATION ADULT. - PROBLEM SELECTOR PLAN 1
multifactorial- may be chemotherapy induced in addition to blood loss from hematuria. Patient is currently hemodynamically stable.   - Will complete 2U PRBCs and will check post transfusion CBC  - Will obtain UA and fecal occult to find source of blood loss   - CBC q12 if there is an adequate response to transfusion   - Telemetry monitor  - fall precautions  - consider iron supplementation if necessary

## 2017-12-05 NOTE — PROGRESS NOTE ADULT - ATTENDING COMMENTS
72 year old female with endometrial carcinoma (On Carboplatin/Taxol; last dose 11/7/17), A-Fib on Eliquis, Asthma presented with fatigue.     In the ED was found to have WBC of 17.63. Hemoglobin was 5.5. UA neg. RVP negative. Bld cxs negative.    Became febrile. CT Chest/Abdomen/Pelvis revealed mural thickening of the sigmoid colon with adjacent air and fluid collection, consistent with contained perforation.     s/p IR drainage with drain in place. cxs in progress - with strep constellatus.    Assessment:  -Sigmoid colon abscess s/p IR drainage 12/2  -Abscess cxs in progress- with strep constellatus      Recommend:  -Continue zosyn.  -F/U drainage cxs  -F/U IR regarding tube check.  -Monitor for fevers and trend WBC.    Jesús Choi MD  Pager [phone]  After 5pm/weekends call [phone]

## 2017-12-05 NOTE — DIETITIAN INITIAL EVALUATION ADULT. - OTHER INFO
Pt seen for length of stay. Pt 73 yo female appears alert, oriented, but weak. Per Pt her appetite/PO intake not well lately. Pt bringing PO supplement: Ensure Plus from home to drink. Pt partially edentulous. But Pt denied having chewing difficulty. No swallowing difficulty voiced either. No report of nausea/vomiting/diarrhea @ present. Pt also stated her UBW: ~145#; Pt lost ~45# since April, 2017. Food preferences discussed with Pt. RDN attempted to conduct physical assessment on Pt, but Pt declined. Pt C/O back pain @ time of visit. Pt asked RDN to came back some other time. RDN remains available, Pt made aware.

## 2017-12-06 DIAGNOSIS — C54.1 MALIGNANT NEOPLASM OF ENDOMETRIUM: ICD-10-CM

## 2017-12-06 DIAGNOSIS — L02.91 CUTANEOUS ABSCESS, UNSPECIFIED: ICD-10-CM

## 2017-12-06 LAB
ALBUMIN SERPL ELPH-MCNC: 2.2 G/DL — LOW (ref 3.3–5)
ALP SERPL-CCNC: 49 U/L — SIGNIFICANT CHANGE UP (ref 40–120)
ALT FLD-CCNC: 8 U/L — SIGNIFICANT CHANGE UP (ref 4–33)
AST SERPL-CCNC: 14 U/L — SIGNIFICANT CHANGE UP (ref 4–32)
BACTERIA BLD CULT: SIGNIFICANT CHANGE UP
BASOPHILS # BLD AUTO: 0.02 K/UL — SIGNIFICANT CHANGE UP (ref 0–0.2)
BASOPHILS NFR BLD AUTO: 0.2 % — SIGNIFICANT CHANGE UP (ref 0–2)
BILIRUB SERPL-MCNC: 0.3 MG/DL — SIGNIFICANT CHANGE UP (ref 0.2–1.2)
BUN SERPL-MCNC: 6 MG/DL — LOW (ref 7–23)
CALCIUM SERPL-MCNC: 8.6 MG/DL — SIGNIFICANT CHANGE UP (ref 8.4–10.5)
CHLORIDE SERPL-SCNC: 103 MMOL/L — SIGNIFICANT CHANGE UP (ref 98–107)
CO2 SERPL-SCNC: 30 MMOL/L — SIGNIFICANT CHANGE UP (ref 22–31)
CREAT SERPL-MCNC: 0.45 MG/DL — LOW (ref 0.5–1.3)
EOSINOPHIL # BLD AUTO: 0.16 K/UL — SIGNIFICANT CHANGE UP (ref 0–0.5)
EOSINOPHIL NFR BLD AUTO: 1.9 % — SIGNIFICANT CHANGE UP (ref 0–6)
GLUCOSE SERPL-MCNC: 89 MG/DL — SIGNIFICANT CHANGE UP (ref 70–99)
HCT VFR BLD CALC: 28 % — LOW (ref 34.5–45)
HGB BLD-MCNC: 8.4 G/DL — LOW (ref 11.5–15.5)
IMM GRANULOCYTES # BLD AUTO: 0.07 # — SIGNIFICANT CHANGE UP
IMM GRANULOCYTES NFR BLD AUTO: 0.8 % — SIGNIFICANT CHANGE UP (ref 0–1.5)
LYMPHOCYTES # BLD AUTO: 1.58 K/UL — SIGNIFICANT CHANGE UP (ref 1–3.3)
LYMPHOCYTES # BLD AUTO: 18.6 % — SIGNIFICANT CHANGE UP (ref 13–44)
MAGNESIUM SERPL-MCNC: 1.3 MG/DL — LOW (ref 1.6–2.6)
MCHC RBC-ENTMCNC: 27.9 PG — SIGNIFICANT CHANGE UP (ref 27–34)
MCHC RBC-ENTMCNC: 30 % — LOW (ref 32–36)
MCV RBC AUTO: 93 FL — SIGNIFICANT CHANGE UP (ref 80–100)
MONOCYTES # BLD AUTO: 0.59 K/UL — SIGNIFICANT CHANGE UP (ref 0–0.9)
MONOCYTES NFR BLD AUTO: 6.9 % — SIGNIFICANT CHANGE UP (ref 2–14)
NEUTROPHILS # BLD AUTO: 6.08 K/UL — SIGNIFICANT CHANGE UP (ref 1.8–7.4)
NEUTROPHILS NFR BLD AUTO: 71.6 % — SIGNIFICANT CHANGE UP (ref 43–77)
NRBC # FLD: 0 — SIGNIFICANT CHANGE UP
PHOSPHATE SERPL-MCNC: 2.9 MG/DL — SIGNIFICANT CHANGE UP (ref 2.5–4.5)
PLATELET # BLD AUTO: 408 K/UL — HIGH (ref 150–400)
PMV BLD: 9.4 FL — SIGNIFICANT CHANGE UP (ref 7–13)
POTASSIUM SERPL-MCNC: 3.8 MMOL/L — SIGNIFICANT CHANGE UP (ref 3.5–5.3)
POTASSIUM SERPL-SCNC: 3.8 MMOL/L — SIGNIFICANT CHANGE UP (ref 3.5–5.3)
PROT SERPL-MCNC: 6.7 G/DL — SIGNIFICANT CHANGE UP (ref 6–8.3)
RBC # BLD: 3.01 M/UL — LOW (ref 3.8–5.2)
RBC # FLD: 16 % — HIGH (ref 10.3–14.5)
SODIUM SERPL-SCNC: 143 MMOL/L — SIGNIFICANT CHANGE UP (ref 135–145)
WBC # BLD: 8.5 K/UL — SIGNIFICANT CHANGE UP (ref 3.8–10.5)
WBC # FLD AUTO: 8.5 K/UL — SIGNIFICANT CHANGE UP (ref 3.8–10.5)

## 2017-12-06 PROCEDURE — 99232 SBSQ HOSP IP/OBS MODERATE 35: CPT

## 2017-12-06 PROCEDURE — 99233 SBSQ HOSP IP/OBS HIGH 50: CPT

## 2017-12-06 RX ORDER — GABAPENTIN 400 MG/1
600 CAPSULE ORAL
Qty: 0 | Refills: 0 | Status: DISCONTINUED | OUTPATIENT
Start: 2017-12-06 | End: 2017-12-08

## 2017-12-06 RX ADMIN — OXYCODONE HYDROCHLORIDE 10 MILLIGRAM(S): 5 TABLET ORAL at 21:54

## 2017-12-06 RX ADMIN — OXYCODONE HYDROCHLORIDE 10 MILLIGRAM(S): 5 TABLET ORAL at 12:30

## 2017-12-06 RX ADMIN — PIPERACILLIN AND TAZOBACTAM 25 GRAM(S): 4; .5 INJECTION, POWDER, LYOPHILIZED, FOR SOLUTION INTRAVENOUS at 14:12

## 2017-12-06 RX ADMIN — OXYCODONE HYDROCHLORIDE 10 MILLIGRAM(S): 5 TABLET ORAL at 11:41

## 2017-12-06 RX ADMIN — GABAPENTIN 600 MILLIGRAM(S): 400 CAPSULE ORAL at 17:09

## 2017-12-06 RX ADMIN — PIPERACILLIN AND TAZOBACTAM 25 GRAM(S): 4; .5 INJECTION, POWDER, LYOPHILIZED, FOR SOLUTION INTRAVENOUS at 05:03

## 2017-12-06 RX ADMIN — PIPERACILLIN AND TAZOBACTAM 25 GRAM(S): 4; .5 INJECTION, POWDER, LYOPHILIZED, FOR SOLUTION INTRAVENOUS at 21:41

## 2017-12-06 RX ADMIN — OXYCODONE HYDROCHLORIDE 10 MILLIGRAM(S): 5 TABLET ORAL at 21:28

## 2017-12-06 RX ADMIN — MONTELUKAST 10 MILLIGRAM(S): 4 TABLET, CHEWABLE ORAL at 14:12

## 2017-12-06 NOTE — PROGRESS NOTE ADULT - SUBJECTIVE AND OBJECTIVE BOX
Patient is a 72y old  Female who presents with a chief complaint of fatigue (29 Nov 2017 15:46)      SUBJECTIVE / OVERNIGHT EVENTS:  Patient has no new complaints. Denies cp, SOB, abdominal pain, N/V/D     MEDICATIONS  (STANDING):  gabapentin 600 milliGRAM(s) Oral at bedtime  montelukast 10 milliGRAM(s) Oral daily  piperacillin/tazobactam IVPB. 3.375 Gram(s) IV Intermittent every 8 hours  sodium chloride 0.9%. 1000 milliLiter(s) (75 mL/Hr) IV Continuous <Continuous>    MEDICATIONS  (PRN):  buDESOnide 160 MICROgram(s)/formoterol 4.5 MICROgram(s) Inhaler 2 Puff(s) Inhalation two times a day PRN shortness of breath  oxyCODONE    IR 10 milliGRAM(s) Oral every 6 hours PRN Severe Pain (7 - 10)      Vital Signs Last 24 Hrs  T(C): 36.9 (06 Dec 2017 12:19), Max: 36.9 (05 Dec 2017 20:49)  T(F): 98.5 (06 Dec 2017 12:19), Max: 98.5 (05 Dec 2017 20:49)  HR: 62 (06 Dec 2017 12:19) (62 - 72)  BP: 129/68 (06 Dec 2017 12:19) (119/55 - 144/50)  BP(mean): --  RR: 16 (06 Dec 2017 12:19) (16 - 17)  SpO2: 100% (06 Dec 2017 12:19) (100% - 100%)  CAPILLARY BLOOD GLUCOSE        I&O's Summary    05 Dec 2017 07:01  -  06 Dec 2017 07:00  --------------------------------------------------------  IN: 0 mL / OUT: 40 mL / NET: -40 mL        PHYSICAL EXAM:  GENERAL: NAD, well-developed  HEAD:  Atraumatic, Normocephalic  EYES: EOMI, PERRLA, conjunctiva and sclera clear  NECK: Supple, No JVD  CHEST/LUNG: Clear to auscultation bilaterally; No wheeze  HEART: Regular rate and rhythm; No murmurs, rubs, or gallops  ABDOMEN:  + pigtail drain. Soft, Nontender, Nondistended; Bowel sounds present  EXTREMITIES:  2+ Peripheral Pulses, No clubbing, cyanosis, or edema  PSYCH: AAOx3  NEUROLOGY: non-focal  SKIN: No rashes or lesions    LABS:                        8.4    8.50  )-----------( 408      ( 06 Dec 2017 06:20 )             28.0     12-06    143  |  103  |  6<L>  ----------------------------<  89  3.8   |  30  |  0.45<L>    Ca    8.6      06 Dec 2017 06:20  Phos  2.9     12-06  Mg     1.3     12-06    TPro  6.7  /  Alb  2.2<L>  /  TBili  0.3  /  DBili  x   /  AST  14  /  ALT  8   /  AlkPhos  49  12-06              RADIOLOGY & ADDITIONAL TESTS:    Imaging Personally Reviewed:    Consultant(s) Notes Reviewed:      Care Discussed with Consultants/Other Providers: ID/NP

## 2017-12-06 NOTE — PROGRESS NOTE ADULT - ATTENDING COMMENTS
72 year old female with endometrial carcinoma (On Carboplatin/Taxol; last dose 11/7/17), A-Fib on Eliquis, Asthma presented with fatigue.     In the ED was found to have WBC of 17.63. Hemoglobin was 5.5. UA neg. RVP negative. Bld cxs negative.    Became febrile. CT Chest/Abdomen/Pelvis revealed mural thickening of the sigmoid colon with adjacent air and fluid collection, consistent with contained perforation.     s/p IR drainage with drain in place. cxs in progress - with strep constellatus.    Assessment:  -Sigmoid colon abscess s/p IR drainage 12/2  -Abscess cxs in progress- with strep constellatus and bacteroides.    Recommend:  -Continue zosyn.  -F/U drainage cxs - remains with purulent drainage.  -F/U IR regarding tube check.  -Monitor for fevers and trend WBC.    Jesús Choi MD  Pager [phone]  After 5pm/weekends call [phone]

## 2017-12-06 NOTE — PROGRESS NOTE ADULT - ATTENDING COMMENTS
Patient seen and examined.  Agree with above.   -no further cardiac workup needed at this time    Giovanny Hood MD  [facility]  [address], [address]  [place] [address]  office: [phone]  pager: [phone]

## 2017-12-06 NOTE — PROGRESS NOTE ADULT - PROBLEM SELECTOR PLAN 2
Sigmoid abscess with contained perforation s/p drainage.  Drainage clearing up, s/p zosyn, leukocytosis resolved.  No further bleeding.    Kae [place]  Oncology Fellow  [phone] Sigmoid abscess with contained perforation s/p drainage.  Drainage clearing up, on zosyn, leukocytosis resolved.  Drain cx still pending. No further bleeding.    Kae Maribel  Oncology Fellow  [phone]

## 2017-12-06 NOTE — PROGRESS NOTE ADULT - SUBJECTIVE AND OBJECTIVE BOX
Subjective:  No CP or SOB    MEDICATIONS  (STANDING):  gabapentin 600 milliGRAM(s) Oral at bedtime  montelukast 10 milliGRAM(s) Oral daily  piperacillin/tazobactam IVPB. 3.375 Gram(s) IV Intermittent every 8 hours  sodium chloride 0.9%. 1000 milliLiter(s) (75 mL/Hr) IV Continuous <Continuous>    LABS:                        8.4    8.50  )-----------( 408      ( 06 Dec 2017 06:20 )             28.0     143  |  103  |  6<L>  ----------------------------<  89  3.8   |  30  |  0.45<L>    Ca    8.6      06 Dec 2017 06:20  Phos  2.9     12-06  Mg     1.3     12-06    TPro  6.7  /  Alb  2.2<L>  /  TBili  0.3  /  DBili  x   /  AST  14  /  ALT  8   /  AlkPhos  49  12-06     PHYSICAL EXAM  Vital Signs Last 24 Hrs  T(C): 36.7 (06 Dec 2017 04:43), Max: 37.1 (05 Dec 2017 12:41)  T(F): 98.1 (06 Dec 2017 04:43), Max: 98.8 (05 Dec 2017 12:41)  HR: 72 (06 Dec 2017 04:43) (60 - 72)  BP: 119/55 (06 Dec 2017 04:43) (119/55 - 144/50)  RR: 16 (06 Dec 2017 04:43) (16 - 17)  SpO2: 100% (06 Dec 2017 04:43) (100% - 100%)    Cardiovascular: Normal S1S2, RRR No JVD, 1/6 BELINDA, Peripheral pulses palpable 2+ B/L  Respiratory: Lungs clear to auscultation, normal effort  Gastrointestinal: Abdomen soft, ND, NT, +BS  Extremity no clubbing, cyanosis or edema B/L LE's     DIAGNOSTIC DATA    < from: CT Chest w/ IV Cont (12.01.17 @ 13:33) >  IMPRESSION:  Mural thickening of the sigmoid colon with adjacent air and fluid   collection, consistent with contained perforation.    Known endometrial cancer is difficult to delineate. Unchanged left   para-aortic lymph node.    Cavitary nodule in left upper lobe demonstrates further interval   cavitation.    Dr. Willson discussed the findings with NP Moje on December 1, 2017 at 3:57   PM.  Readback was obtained.      AMAURY BARRETT M.D., RADIOLOGY RESIDENT  This document has been electronically signed.  ALESSANDRO WILLSON M.D., ATTENDING RADIOLOGIST  This document has been electronically signed. Dec  1 2017  3:57PM    < end of copied text >    < from: CT Head No Cont (11.28.17 @ 19:35) >  IMPRESSION:   No CT evidence of acute intracranial hemorrhage, brain edema, or mass   effect. No displaced calvarial fracture.    AGUSTIN CONROY M.D., RADIOLOGY RESIDENT  This document has been electronically signed.  JIMBO BATISTA M.D., ATTENDING RADIOLOGIST  This document has been electronically signed. Nov 28 2017  8:07PM    < end of copied text >    ASSESSMENT/PLAN:   72 year old Female with hx of asthma, atrial fibrillation on Eliquis, with NL LV function on TTE 12/2016, with unremarkable plain TST 3/2016,  stage 4 endometrial carcinoma (on Carboplatin and Taxol) presenting with one day history of fatigue found to be markedly anemic, s/p 2 units PRBCs transfused, with increased confusion (negative CT head) and leukocytosis due to sigmoid colon fluid collection s/p IR drainage    --Eliquis on hold given profound anemia requiring transfusion   --Metoprolol on hold.  BP/HR currently stable  - HD stable- not in CHF  --on IV Abx    Lorena Bianchi PA-C  [facility]  [address], [address]   [place] [address]  office [phone]  pager [phone]

## 2017-12-06 NOTE — PROGRESS NOTE ADULT - SUBJECTIVE AND OBJECTIVE BOX
INTERVAL HPI/OVERNIGHT EVENTS:  Patient S&E at bedside.  Neuropathic pain in her feet uncontrolled, requesting back to home regimen.  Otherwise, she denies any other complaints.  Drain in place.  No further bleeding noted.    VITAL SIGNS:  T(F): 98.5 (12-06-17 @ 12:19)  HR: 62 (12-06-17 @ 12:19)  BP: 129/68 (12-06-17 @ 12:19)  RR: 16 (12-06-17 @ 12:19)  SpO2: 100% (12-06-17 @ 12:19)  Wt(kg): --    PHYSICAL EXAM:  Constitutional: NAD, thin female  HEENT: PERRL, EOMI, MMM  Respiratory: CTA b/l, good air entry b/l  Cardiovascular: RRR, no M/R/G  Gastrointestinal: soft, NT/ND, +bs, drain with clear yellow fluid   Extremities: foot pain b/l upon palpation, 2+ DP, no LE edema  Neurological: AAOx3    MEDICATIONS  (STANDING):  gabapentin 600 milliGRAM(s) Oral at bedtime  montelukast 10 milliGRAM(s) Oral daily  piperacillin/tazobactam IVPB. 3.375 Gram(s) IV Intermittent every 8 hours  sodium chloride 0.9%. 1000 milliLiter(s) (75 mL/Hr) IV Continuous <Continuous>    MEDICATIONS  (PRN):  buDESOnide 160 MICROgram(s)/formoterol 4.5 MICROgram(s) Inhaler 2 Puff(s) Inhalation two times a day PRN shortness of breath  oxyCODONE    IR 10 milliGRAM(s) Oral every 6 hours PRN Severe Pain (7 - 10)    Allergies  No Known Allergies    LABS:                        8.4    8.50  )-----------( 408      ( 06 Dec 2017 06:20 )             28.0     12-06    143  |  103  |  6<L>  ----------------------------<  89  3.8   |  30  |  0.45<L>    Ca    8.6      06 Dec 2017 06:20  Phos  2.9     12-06  Mg     1.3     12-06    TPro  6.7  /  Alb  2.2<L>  /  TBili  0.3  /  DBili  x   /  AST  14  /  ALT  8   /  AlkPhos  49  12-06        EXAM:  CT CHEST IC    EXAM:  CT ABDOMEN AND PELVIS IC      PROCEDURE DATE:  Dec  1 2017     INTERPRETATION:  CLINICAL INFORMATION: Fever, history of endometrial   cancer, evaluate for source of infection.    COMPARISON: CT chest/abdomen/pelvis with IV and oral contrast 10/4/2017    PROCEDURE:   CT of the Chest, Abdomen and Pelvis was performed with intravenous   contrast.   Intravenous contrast: 80 ml Omnipaque 350. 20 ml discarded.  Oral contrast: None.  Sagittal and coronal reformats were performed.    FINDINGS:    CHEST:     LUNGS AND LARGE AIRWAYS: Patent central airways. Redemonstrated 0.9 x 0.8   cm cavitary lesion in the left upper lobe; overall size is unchanged,   however the central cavity is slightly increased in size while the   surrounding hyperdensity is decreased in thickness. Unchanged 0.4 cm   nodule in the left lower lobe.  PLEURA: Trace bilateral pleural effusions.  VESSELS: No thoracic aortic aneurysm. The vessels are within normal   limits.  HEART: Heart size is normal. No pericardial effusion. Aortic annular   calcifications.  MEDIASTINUM AND ZELALEM: No lymphadenopathy.  CHEST WALL AND LOWER NECK: Within normal limits.    ABDOMEN AND PELVIS:    LIVER: Focal fatty infiltration around the falciform ligament.  BILE DUCTS: Normal caliber.  GALLBLADDER: Within normal limits.  SPLEEN: Within normal limits.  PANCREAS: Within normal limits.  ADRENALS: Within normal limits.  KIDNEYS/URETERS: Within normal limits.    BLADDER: Within normal limits.  REPRODUCTIVE ORGANS: Enlarged uterus with multiple leiomyomas, some of   which are calcified. Small amount of fluid within the endometrial cavity.     BOWEL: Mural thickening of the sigmoid colon with adjacent collection of   air and fluid, measuring 5.8 x 8.5 x 8.3 cm anterior/superior to the   uterus (AP x TV x CC; series 2, image 98; series 602, image 25). No bowel   obstruction. Appendix is not definitively visualized, however there is no   evidence of appendicitis.  PERITONEUM: No abdominopelvic ascites or pneumoperitoneum.  VESSELS:  No aortic aneurysm. Mild vascular calcifications.  RETROPERITONEUM: No lymphadenopathy. Unchanged 0.7 x 0.6 cm left   para-aortic lymph node.  ABDOMINAL WALL: Mild anasarca.  BONES: Unchanged sclerotic lesions in the left sixth rib and right   suprapubic ramus. Mild thoracolumbar spondylosis.    IMPRESSION:  Mural thickening of the sigmoid colon with adjacent air and fluid   collection, consistent with contained perforation.    Known endometrial cancer is difficult to delineate. Unchanged left   para-aortic lymph node.    Cavitary nodule in left upper lobe demonstrates further interval   cavitation.    Dr. Poe discussed the findings with NORMA Mccarty on December 1, 2017 at 3:57   PM.  Readback was obtained.        AMAURY BARRETT M.D., RADIOLOGY RESIDENT  This document has been electronically signed.  ALESSANDRO POE M.D., ATTENDING RADIOLOGIST  This document has been electronically signed. Dec  1 2017  3:57PM

## 2017-12-06 NOTE — PROGRESS NOTE ADULT - PROBLEM SELECTOR PLAN 2
- Pt was spiking fever, with leukocytosis, and HR > 97 with fever, meet sepsis criteria. sepsis resolving.  - possible due to sigmoid colon abscess/contained perforation on CT   - c/w  zosyn, s/p IR percutaneous drainage catheter.   - so far Blood Cx/Urine Cx negative   fluid Cx grew pansensitive streptococcus constellatus  f/u ID recs.

## 2017-12-06 NOTE — PROGRESS NOTE ADULT - PROBLEM SELECTOR PLAN 5
(on Carboplatin and Taxol)  - Will continue neuropathy pain management with home oxycodone 10mg q6hr restarted neurontin 600mg qHs with mild improvement in pain. (on Carboplatin and Taxol)  - Will continue neuropathy pain management with home oxycodone 10mg q6hr prn . increased neurontin to 600mg bid

## 2017-12-06 NOTE — PROGRESS NOTE ADULT - SUBJECTIVE AND OBJECTIVE BOX
CC: F/U sigmoid colon abscess s/p IR drainage    Inverval History/ROS: Patient s/p IR drainage on 12/2. Drainage cx in progress. Patient feels well. No complaints today. Denies N/V/D/C, fever, chills, chest pain, sob, cough. Sitting in chair eating breakfast.    Allergies  No Known Allergies        ANTIMICROBIALS:  piperacillin/tazobactam IVPB. 3.375 every 8 hours      OTHER MEDS:  buDESOnide 160 MICROgram(s)/formoterol 4.5 MICROgram(s) Inhaler 2 Puff(s) Inhalation two times a day PRN  gabapentin 600 milliGRAM(s) Oral at bedtime  montelukast 10 milliGRAM(s) Oral daily  oxyCODONE    IR 10 milliGRAM(s) Oral every 6 hours PRN  sodium chloride 0.9%. 1000 milliLiter(s) IV Continuous <Continuous>      PE:    Vital Signs Last 24 Hrs  T(C): 36.7 (06 Dec 2017 04:43), Max: 37.1 (05 Dec 2017 12:41)  T(F): 98.1 (06 Dec 2017 04:43), Max: 98.8 (05 Dec 2017 12:41)  HR: 72 (06 Dec 2017 04:43) (60 - 72)  BP: 119/55 (06 Dec 2017 04:43) (119/55 - 144/50)  BP(mean): --  RR: 16 (06 Dec 2017 04:43) (16 - 17)  SpO2: 100% (06 Dec 2017 04:43) (100% - 100%)    Gen: AOx3, NAD, non-toxic, pleasant  CV: S1+S2 normal, no murmurs  Resp: Clear bilat, no resp distress  Abd: Soft, nontender, +BS  Ext: No LE edema, no wounds  : No Feng  IV/Skin: No thrombophlebitis, +drain in place with purulent drainage  Neuro: no focal deficits.    LABS:                          8.4    8.50  )-----------( 408      ( 06 Dec 2017 06:20 )             28.0       12-06    143  |  103  |  6<L>  ----------------------------<  89  3.8   |  30  |  0.45<L>    Ca    8.6      06 Dec 2017 06:20  Phos  2.9     12-06  Mg     1.3     12-06    TPro  6.7  /  Alb  2.2<L>  /  TBili  0.3  /  DBili  x   /  AST  14  /  ALT  8   /  AlkPhos  49  12-06      MICROBIOLOGY:  v  DRAINAGE  12-02-17 --  --  Streptococcus constellatus  Bacteroides thetaiotaomicron      BLOOD PERIPHERAL  12-01-17 --  --  --      URINE MIDSTREAM  11-30-17 --  --  --      BLOOD PERIPHERAL  11-30-17 --  --  --    RADIOLOGY:    No new images.

## 2017-12-06 NOTE — PROGRESS NOTE ADULT - PROBLEM SELECTOR PLAN 3
- s/p IR drainage,  Tolerated oral diet.   - surgery and IR follow up of wound dressing. continue zosyn. Leukocytosis resolved, fever resolved.   - pain management.

## 2017-12-07 LAB
BUN SERPL-MCNC: 6 MG/DL — LOW (ref 7–23)
CALCIUM SERPL-MCNC: 8.8 MG/DL — SIGNIFICANT CHANGE UP (ref 8.4–10.5)
CHLORIDE SERPL-SCNC: 102 MMOL/L — SIGNIFICANT CHANGE UP (ref 98–107)
CO2 SERPL-SCNC: 31 MMOL/L — SIGNIFICANT CHANGE UP (ref 22–31)
CREAT SERPL-MCNC: 0.47 MG/DL — LOW (ref 0.5–1.3)
GLUCOSE SERPL-MCNC: 78 MG/DL — SIGNIFICANT CHANGE UP (ref 70–99)
HCT VFR BLD CALC: 28.3 % — LOW (ref 34.5–45)
HGB BLD-MCNC: 8.4 G/DL — LOW (ref 11.5–15.5)
MCHC RBC-ENTMCNC: 27.7 PG — SIGNIFICANT CHANGE UP (ref 27–34)
MCHC RBC-ENTMCNC: 29.7 % — LOW (ref 32–36)
MCV RBC AUTO: 93.4 FL — SIGNIFICANT CHANGE UP (ref 80–100)
NRBC # FLD: 0 — SIGNIFICANT CHANGE UP
PLATELET # BLD AUTO: 410 K/UL — HIGH (ref 150–400)
PMV BLD: 9.2 FL — SIGNIFICANT CHANGE UP (ref 7–13)
POTASSIUM SERPL-MCNC: 3.5 MMOL/L — SIGNIFICANT CHANGE UP (ref 3.5–5.3)
POTASSIUM SERPL-SCNC: 3.5 MMOL/L — SIGNIFICANT CHANGE UP (ref 3.5–5.3)
RBC # BLD: 3.03 M/UL — LOW (ref 3.8–5.2)
RBC # FLD: 16.1 % — HIGH (ref 10.3–14.5)
SODIUM SERPL-SCNC: 142 MMOL/L — SIGNIFICANT CHANGE UP (ref 135–145)
WBC # BLD: 8.3 K/UL — SIGNIFICANT CHANGE UP (ref 3.8–10.5)
WBC # FLD AUTO: 8.3 K/UL — SIGNIFICANT CHANGE UP (ref 3.8–10.5)

## 2017-12-07 PROCEDURE — 99233 SBSQ HOSP IP/OBS HIGH 50: CPT

## 2017-12-07 PROCEDURE — 99232 SBSQ HOSP IP/OBS MODERATE 35: CPT

## 2017-12-07 RX ORDER — OXYCODONE HYDROCHLORIDE 5 MG/1
10 TABLET ORAL EVERY 6 HOURS
Qty: 0 | Refills: 0 | Status: DISCONTINUED | OUTPATIENT
Start: 2017-12-07 | End: 2017-12-08

## 2017-12-07 RX ADMIN — OXYCODONE HYDROCHLORIDE 10 MILLIGRAM(S): 5 TABLET ORAL at 22:25

## 2017-12-07 RX ADMIN — PIPERACILLIN AND TAZOBACTAM 25 GRAM(S): 4; .5 INJECTION, POWDER, LYOPHILIZED, FOR SOLUTION INTRAVENOUS at 13:10

## 2017-12-07 RX ADMIN — OXYCODONE HYDROCHLORIDE 10 MILLIGRAM(S): 5 TABLET ORAL at 16:18

## 2017-12-07 RX ADMIN — PIPERACILLIN AND TAZOBACTAM 25 GRAM(S): 4; .5 INJECTION, POWDER, LYOPHILIZED, FOR SOLUTION INTRAVENOUS at 05:42

## 2017-12-07 RX ADMIN — GABAPENTIN 600 MILLIGRAM(S): 400 CAPSULE ORAL at 06:18

## 2017-12-07 RX ADMIN — MONTELUKAST 10 MILLIGRAM(S): 4 TABLET, CHEWABLE ORAL at 13:10

## 2017-12-07 RX ADMIN — GABAPENTIN 600 MILLIGRAM(S): 400 CAPSULE ORAL at 17:00

## 2017-12-07 RX ADMIN — PIPERACILLIN AND TAZOBACTAM 25 GRAM(S): 4; .5 INJECTION, POWDER, LYOPHILIZED, FOR SOLUTION INTRAVENOUS at 22:25

## 2017-12-07 RX ADMIN — OXYCODONE HYDROCHLORIDE 10 MILLIGRAM(S): 5 TABLET ORAL at 22:55

## 2017-12-07 NOTE — PROGRESS NOTE ADULT - PROBLEM SELECTOR PLAN 2
- Pt was spiking fever, with leukocytosis, and HR > 97 with fever, meet sepsis criteria. sepsis resolved.  - possible due to sigmoid colon abscess/contained perforation on CT   - c/w  zosyn, s/p IR percutaneous drainage catheter.   - so far Blood Cx/Urine Cx negative   fluid Cx grew pansensitive streptococcus constellatus  f/u ID recs.

## 2017-12-07 NOTE — PROGRESS NOTE ADULT - ATTENDING COMMENTS
72 year old female with endometrial carcinoma (On Carboplatin/Taxol; last dose 11/7/17), A-Fib on Eliquis, Asthma presented with fatigue.     In the ED was found to have WBC of 17.63. Hemoglobin was 5.5. UA neg. RVP negative. Bld cxs negative.    Became febrile. CT Chest/Abdomen/Pelvis revealed mural thickening of the sigmoid colon with adjacent air and fluid collection, consistent with contained perforation.     s/p IR drainage with drain in place. cxs in progress - with strep constellatus.    Assessment:  -Sigmoid colon abscess s/p IR drainage 12/2  -Abscess cxs in progress- with strep constellatus and bacteroides.    Recommend:  -Continue zosyn.  -F/U drainage cxs - remains with purulent drainage.  -F/U IR regarding tube check.  -Monitor for fevers and trend WBC.    Discussed with NP.    Jesús Choi MD  Pager [phone]  After 5pm/weekends call [phone]

## 2017-12-07 NOTE — PROGRESS NOTE ADULT - PROBLEM SELECTOR PLAN 3
- s/p IR drainage,  Tolerated oral diet.   - surgery and IR changed wound dressing yesterday. continue zosyn. Leukocytosis resolved, fever resolved.   - pain management.

## 2017-12-07 NOTE — PROGRESS NOTE ADULT - ATTENDING COMMENTS
Agree with above.   -Eliquis on hold for severe anemia  -monitor h/h    Giovanny Hood MD  [facility]  [address], [address]  [place] [address]  office: [phone]  pager: [phone]

## 2017-12-07 NOTE — PROGRESS NOTE ADULT - PROBLEM SELECTOR PLAN 5
(on Carboplatin and Taxol)  - Will continue neuropathy pain management with home oxycodone 10mg q6hr prn . increased neurontin to 600mg bid

## 2017-12-07 NOTE — PROGRESS NOTE ADULT - SUBJECTIVE AND OBJECTIVE BOX
Patient is a 72y old  Female who presents with a chief complaint of fatigue (29 Nov 2017 15:46)      SUBJECTIVE / OVERNIGHT EVENTS:  patient says LE neuropathy improved with increase in neurontin. Patient has no new complaints. Denies cp, SOB, abdominal pain, N/V/D     MEDICATIONS  (STANDING):  gabapentin 600 milliGRAM(s) Oral <User Schedule>  montelukast 10 milliGRAM(s) Oral daily  piperacillin/tazobactam IVPB. 3.375 Gram(s) IV Intermittent every 8 hours  sodium chloride 0.9%. 1000 milliLiter(s) (75 mL/Hr) IV Continuous <Continuous>    MEDICATIONS  (PRN):  buDESOnide 160 MICROgram(s)/formoterol 4.5 MICROgram(s) Inhaler 2 Puff(s) Inhalation two times a day PRN shortness of breath      Vital Signs Last 24 Hrs  T(C): 36.8 (07 Dec 2017 12:36), Max: 36.9 (06 Dec 2017 21:15)  T(F): 98.3 (07 Dec 2017 12:36), Max: 98.5 (06 Dec 2017 21:15)  HR: 72 (07 Dec 2017 12:36) (63 - 72)  BP: 124/60 (07 Dec 2017 12:36) (124/60 - 131/62)  BP(mean): --  RR: 18 (07 Dec 2017 12:36) (17 - 18)  SpO2: 100% (07 Dec 2017 12:36) (100% - 100%)  CAPILLARY BLOOD GLUCOSE        I&O's Summary    06 Dec 2017 07:01  -  07 Dec 2017 07:00  --------------------------------------------------------  IN: 0 mL / OUT: 20 mL / NET: -20 mL        PHYSICAL EXAM:  GENERAL: NAD, well-developed  HEAD:  Atraumatic, Normocephalic  EYES: EOMI, PERRLA, conjunctiva and sclera clear  NECK: Supple, No JVD  CHEST/LUNG: Clear to auscultation bilaterally; No wheeze  HEART: Regular rate and rhythm; No murmurs, rubs, or gallops  ABDOMEN: Soft, Nontender, Nondistended; Bowel sounds present  EXTREMITIES:  + right pigtail catheter. 2+ Peripheral Pulses, No clubbing, cyanosis, or edema  PSYCH: AAOx3  NEUROLOGY: non-focal  SKIN: No rashes or lesions    LABS:                        8.4    8.30  )-----------( 410      ( 07 Dec 2017 05:39 )             28.3     12-07    142  |  102  |  6<L>  ----------------------------<  78  3.5   |  31  |  0.47<L>    Ca    8.8      07 Dec 2017 05:39  Phos  2.9     12-06  Mg     1.3     12-06    TPro  6.7  /  Alb  2.2<L>  /  TBili  0.3  /  DBili  x   /  AST  14  /  ALT  8   /  AlkPhos  49  12-06              RADIOLOGY & ADDITIONAL TESTS:    Imaging Personally Reviewed:    Consultant(s) Notes Reviewed:      Care Discussed with Consultants/Other Providers: NP/Oncology

## 2017-12-07 NOTE — PROGRESS NOTE ADULT - SUBJECTIVE AND OBJECTIVE BOX
Subjective:  No CP or SOB    MEDICATIONS  (STANDING):  gabapentin 600 milliGRAM(s) Oral <User Schedule>  montelukast 10 milliGRAM(s) Oral daily  piperacillin/tazobactam IVPB. 3.375 Gram(s) IV Intermittent every 8 hours  sodium chloride 0.9%. 1000 milliLiter(s) (75 mL/Hr) IV Continuous <Continuous>    LABS:                        8.4    8.30  )-----------( 410      ( 07 Dec 2017 05:39 )             28.3    142  |  102  |  6<L>  ----------------------------<  78  3.5   |  31  |  0.47<L>    Ca    8.8      07 Dec 2017 05:39  Phos  2.9     12-06  Mg     1.3     12-06    TPro  6.7  /  Alb  2.2<L>  /  TBili  0.3  /  DBili  x   /  AST  14  /  ALT  8   /  AlkPhos  49  12-06    Creatinine Trend: 0.47<--, 0.45<--, 0.50<--, 0.49<--, 0.47<--, 0.53<--     PHYSICAL EXAM  Vital Signs Last 24 Hrs  T(C): 36.8 (07 Dec 2017 12:36), Max: 36.9 (06 Dec 2017 21:15)  T(F): 98.3 (07 Dec 2017 12:36), Max: 98.5 (06 Dec 2017 21:15)  HR: 72 (07 Dec 2017 12:36) (63 - 72)  BP: 124/60 (07 Dec 2017 12:36) (124/60 - 131/62)  RR: 18 (07 Dec 2017 12:36) (17 - 18)  SpO2: 100% (07 Dec 2017 12:36) (100% - 100%)    Cardiovascular: Normal S1S2, RRR No JVD, 1/6 BELINDA, Peripheral pulses palpable 2+ B/L  Respiratory: Lungs clear to auscultation, normal effort  Gastrointestinal: Abdomen soft, ND, NT, +BS  Extremity no clubbing, cyanosis or edema B/L LE's     DIAGNOSTIC DATA    < from: CT Chest w/ IV Cont (12.01.17 @ 13:33) >  IMPRESSION:  Mural thickening of the sigmoid colon with adjacent air and fluid   collection, consistent with contained perforation.    Known endometrial cancer is difficult to delineate. Unchanged left   para-aortic lymph node.    Cavitary nodule in left upper lobe demonstrates further interval   cavitation.    Dr. Willson discussed the findings with NORMA Mccarty on December 1, 2017 at 3:57   PM.  Readback was obtained.      AMAURY BARRETT M.D., RADIOLOGY RESIDENT  This document has been electronically signed.  ALESSANDRO WILLSON M.D., ATTENDING RADIOLOGIST  This document has been electronically signed. Dec  1 2017  3:57PM    < end of copied text >    < from: CT Head No Cont (11.28.17 @ 19:35) >  IMPRESSION:   No CT evidence of acute intracranial hemorrhage, brain edema, or mass   effect. No displaced calvarial fracture.    AGUSTIN CONROY M.D., RADIOLOGY RESIDENT  This document has been electronically signed.  JIMBO BATISTA M.D., ATTENDING RADIOLOGIST  This document has been electronically signed. Nov 28 2017  8:07PM    < end of copied text >    ASSESSMENT/PLAN:   72 year old Female with hx of asthma, atrial fibrillation on Eliquis, with NL LV function on TTE 12/2016, with unremarkable plain TST 3/2016,  stage 4 endometrial carcinoma (on Carboplatin and Taxol) presenting with one day history of fatigue found to be markedly anemic, s/p 2 units PRBCs transfused, with increased confusion (negative CT head) and leukocytosis due to sigmoid colon fluid collection s/p IR drainage    --Eliquis on hold given profound anemia requiring transfusion   --Metoprolol on hold.  BP/HR currently stable  - HD stable- not in CHF  --on IV Abx    Lorean Bianchi PA-C  [facility]  [address], [address]   [place] [address]  office [phone]  pager [phone]

## 2017-12-07 NOTE — PROGRESS NOTE ADULT - SUBJECTIVE AND OBJECTIVE BOX
CC: F/U sigmoid colon abscess s/p IR drainage    Inverval History/ROS: Patient s/p IR drainage on 12/2. Drainage cx in progress. Patient feels well. No complaints today. Denies N/V/D/C, fever, chills, chest pain, sob, cough.      Allergies  No Known Allergies        ANTIMICROBIALS:  piperacillin/tazobactam IVPB. 3.375 every 8 hours      OTHER MEDS:  buDESOnide 160 MICROgram(s)/formoterol 4.5 MICROgram(s) Inhaler 2 Puff(s) Inhalation two times a day PRN  gabapentin 600 milliGRAM(s) Oral <User Schedule>  montelukast 10 milliGRAM(s) Oral daily  sodium chloride 0.9%. 1000 milliLiter(s) IV Continuous <Continuous>      PE:    Vital Signs Last 24 Hrs  T(C): 36.8 (07 Dec 2017 12:36), Max: 36.9 (06 Dec 2017 21:15)  T(F): 98.3 (07 Dec 2017 12:36), Max: 98.5 (06 Dec 2017 21:15)  HR: 72 (07 Dec 2017 12:36) (63 - 72)  BP: 124/60 (07 Dec 2017 12:36) (124/60 - 131/62)  BP(mean): --  RR: 18 (07 Dec 2017 12:36) (17 - 18)  SpO2: 100% (07 Dec 2017 12:36) (100% - 100%)    Gen: AOx3, NAD, non-toxic, pleasant  CV: S1+S2 normal, no murmurs  Resp: Clear bilat, no resp distress  Abd: Soft, nontender, +BS  Ext: No LE edema, no wounds  : No Feng  IV/Skin: No thrombophlebitis, +drain in place with purulent drainage.  Neuro: no focal deficits.      LABS:                          8.4    8.30  )-----------( 410      ( 07 Dec 2017 05:39 )             28.3       12-07    142  |  102  |  6<L>  ----------------------------<  78  3.5   |  31  |  0.47<L>    Ca    8.8      07 Dec 2017 05:39  Phos  2.9     12-06  Mg     1.3     12-06    TPro  6.7  /  Alb  2.2<L>  /  TBili  0.3  /  DBili  x   /  AST  14  /  ALT  8   /  AlkPhos  49  12-06    MICROBIOLOGY:  v  DRAINAGE  12-02-17 --  --  Streptococcus constellatus  Bacteroides thetaiotaomicron      BLOOD PERIPHERAL  12-01-17 --  --  --      URINE MIDSTREAM  11-30-17 --  --  --      BLOOD PERIPHERAL  11-30-17 --  --  --      RADIOLOGY:    No new images.

## 2017-12-08 VITALS
OXYGEN SATURATION: 100 % | RESPIRATION RATE: 17 BRPM | TEMPERATURE: 98 F | SYSTOLIC BLOOD PRESSURE: 110 MMHG | DIASTOLIC BLOOD PRESSURE: 62 MMHG | HEART RATE: 65 BPM

## 2017-12-08 LAB
-  CEFTRIAXONE: SIGNIFICANT CHANGE UP
-  CLINDAMYCIN: SIGNIFICANT CHANGE UP
-  ERYTHROMYCIN: SIGNIFICANT CHANGE UP
-  PENICILLIN G: SIGNIFICANT CHANGE UP
-  VANCOMYCIN: SIGNIFICANT CHANGE UP
BACTERIA FLD CULT: SIGNIFICANT CHANGE UP
BASOPHILS # BLD AUTO: 0.03 K/UL — SIGNIFICANT CHANGE UP (ref 0–0.2)
BASOPHILS NFR BLD AUTO: 0.4 % — SIGNIFICANT CHANGE UP (ref 0–2)
BUN SERPL-MCNC: 8 MG/DL — SIGNIFICANT CHANGE UP (ref 7–23)
CALCIUM SERPL-MCNC: 8.7 MG/DL — SIGNIFICANT CHANGE UP (ref 8.4–10.5)
CHLORIDE SERPL-SCNC: 103 MMOL/L — SIGNIFICANT CHANGE UP (ref 98–107)
CO2 SERPL-SCNC: 32 MMOL/L — HIGH (ref 22–31)
CREAT SERPL-MCNC: 0.45 MG/DL — LOW (ref 0.5–1.3)
EOSINOPHIL # BLD AUTO: 0.17 K/UL — SIGNIFICANT CHANGE UP (ref 0–0.5)
EOSINOPHIL NFR BLD AUTO: 2.1 % — SIGNIFICANT CHANGE UP (ref 0–6)
GLUCOSE SERPL-MCNC: 90 MG/DL — SIGNIFICANT CHANGE UP (ref 70–99)
HCT VFR BLD CALC: 28.4 % — LOW (ref 34.5–45)
HGB BLD-MCNC: 8.6 G/DL — LOW (ref 11.5–15.5)
IMM GRANULOCYTES # BLD AUTO: 0.04 # — SIGNIFICANT CHANGE UP
IMM GRANULOCYTES NFR BLD AUTO: 0.5 % — SIGNIFICANT CHANGE UP (ref 0–1.5)
LYMPHOCYTES # BLD AUTO: 1.69 K/UL — SIGNIFICANT CHANGE UP (ref 1–3.3)
LYMPHOCYTES # BLD AUTO: 20.8 % — SIGNIFICANT CHANGE UP (ref 13–44)
MAGNESIUM SERPL-MCNC: 1.2 MG/DL — LOW (ref 1.6–2.6)
MCHC RBC-ENTMCNC: 28.2 PG — SIGNIFICANT CHANGE UP (ref 27–34)
MCHC RBC-ENTMCNC: 30.3 % — LOW (ref 32–36)
MCV RBC AUTO: 93.1 FL — SIGNIFICANT CHANGE UP (ref 80–100)
MONOCYTES # BLD AUTO: 0.6 K/UL — SIGNIFICANT CHANGE UP (ref 0–0.9)
MONOCYTES NFR BLD AUTO: 7.4 % — SIGNIFICANT CHANGE UP (ref 2–14)
NEUTROPHILS # BLD AUTO: 5.61 K/UL — SIGNIFICANT CHANGE UP (ref 1.8–7.4)
NEUTROPHILS NFR BLD AUTO: 68.8 % — SIGNIFICANT CHANGE UP (ref 43–77)
NRBC # FLD: 0 — SIGNIFICANT CHANGE UP
ORGANISM # SPEC MICROSCOPIC CNT: SIGNIFICANT CHANGE UP
ORGANISM # SPEC MICROSCOPIC CNT: SIGNIFICANT CHANGE UP
PLATELET # BLD AUTO: 389 K/UL — SIGNIFICANT CHANGE UP (ref 150–400)
PMV BLD: 9.1 FL — SIGNIFICANT CHANGE UP (ref 7–13)
POTASSIUM SERPL-MCNC: 3.5 MMOL/L — SIGNIFICANT CHANGE UP (ref 3.5–5.3)
POTASSIUM SERPL-SCNC: 3.5 MMOL/L — SIGNIFICANT CHANGE UP (ref 3.5–5.3)
RBC # BLD: 3.05 M/UL — LOW (ref 3.8–5.2)
RBC # FLD: 16.4 % — HIGH (ref 10.3–14.5)
SODIUM SERPL-SCNC: 143 MMOL/L — SIGNIFICANT CHANGE UP (ref 135–145)
WBC # BLD: 8.14 K/UL — SIGNIFICANT CHANGE UP (ref 3.8–10.5)
WBC # FLD AUTO: 8.14 K/UL — SIGNIFICANT CHANGE UP (ref 3.8–10.5)

## 2017-12-08 PROCEDURE — 99232 SBSQ HOSP IP/OBS MODERATE 35: CPT

## 2017-12-08 PROCEDURE — 99239 HOSP IP/OBS DSCHRG MGMT >30: CPT

## 2017-12-08 RX ORDER — APIXABAN 2.5 MG/1
5 TABLET, FILM COATED ORAL EVERY 12 HOURS
Qty: 0 | Refills: 0 | Status: DISCONTINUED | OUTPATIENT
Start: 2017-12-08 | End: 2017-12-08

## 2017-12-08 RX ORDER — GABAPENTIN 400 MG/1
1 CAPSULE ORAL
Qty: 60 | Refills: 0 | OUTPATIENT
Start: 2017-12-08 | End: 2018-01-07

## 2017-12-08 RX ORDER — MAGNESIUM OXIDE 400 MG ORAL TABLET 241.3 MG
400 TABLET ORAL
Qty: 0 | Refills: 0 | Status: DISCONTINUED | OUTPATIENT
Start: 2017-12-08 | End: 2017-12-08

## 2017-12-08 RX ORDER — GABAPENTIN 400 MG/1
1 CAPSULE ORAL
Qty: 0 | Refills: 0 | COMMUNITY
Start: 2017-12-08

## 2017-12-08 RX ORDER — OXYCODONE HYDROCHLORIDE 5 MG/1
1 TABLET ORAL
Qty: 40 | Refills: 0 | OUTPATIENT
Start: 2017-12-08 | End: 2017-12-18

## 2017-12-08 RX ORDER — OXYCODONE HYDROCHLORIDE 5 MG/1
1 TABLET ORAL
Qty: 0 | Refills: 0 | COMMUNITY

## 2017-12-08 RX ORDER — APIXABAN 2.5 MG/1
1 TABLET, FILM COATED ORAL
Qty: 60 | Refills: 0 | OUTPATIENT
Start: 2017-12-08 | End: 2018-01-07

## 2017-12-08 RX ORDER — MAGNESIUM SULFATE 500 MG/ML
2 VIAL (ML) INJECTION ONCE
Qty: 0 | Refills: 0 | Status: COMPLETED | OUTPATIENT
Start: 2017-12-08 | End: 2017-12-08

## 2017-12-08 RX ORDER — L.ACIDOPH/B.ANIMALIS/B.LONGUM 15B CELL
1 CAPSULE ORAL
Qty: 20 | Refills: 0 | OUTPATIENT
Start: 2017-12-08 | End: 2017-12-18

## 2017-12-08 RX ADMIN — PIPERACILLIN AND TAZOBACTAM 25 GRAM(S): 4; .5 INJECTION, POWDER, LYOPHILIZED, FOR SOLUTION INTRAVENOUS at 06:05

## 2017-12-08 RX ADMIN — MAGNESIUM OXIDE 400 MG ORAL TABLET 400 MILLIGRAM(S): 241.3 TABLET ORAL at 19:37

## 2017-12-08 RX ADMIN — GABAPENTIN 600 MILLIGRAM(S): 400 CAPSULE ORAL at 06:05

## 2017-12-08 RX ADMIN — Medication 50 GRAM(S): at 12:21

## 2017-12-08 RX ADMIN — MONTELUKAST 10 MILLIGRAM(S): 4 TABLET, CHEWABLE ORAL at 12:20

## 2017-12-08 RX ADMIN — GABAPENTIN 600 MILLIGRAM(S): 400 CAPSULE ORAL at 19:37

## 2017-12-08 RX ADMIN — OXYCODONE HYDROCHLORIDE 10 MILLIGRAM(S): 5 TABLET ORAL at 12:20

## 2017-12-08 RX ADMIN — MAGNESIUM OXIDE 400 MG ORAL TABLET 400 MILLIGRAM(S): 241.3 TABLET ORAL at 12:20

## 2017-12-08 RX ADMIN — PIPERACILLIN AND TAZOBACTAM 25 GRAM(S): 4; .5 INJECTION, POWDER, LYOPHILIZED, FOR SOLUTION INTRAVENOUS at 13:07

## 2017-12-08 RX ADMIN — OXYCODONE HYDROCHLORIDE 10 MILLIGRAM(S): 5 TABLET ORAL at 13:10

## 2017-12-08 RX ADMIN — APIXABAN 5 MILLIGRAM(S): 2.5 TABLET, FILM COATED ORAL at 19:53

## 2017-12-08 NOTE — PROGRESS NOTE ADULT - PROBLEM SELECTOR PROBLEM 5
Endometrial cancer
Atrial fibrillation, unspecified type
Atrial fibrillation, unspecified type
Endometrial cancer

## 2017-12-08 NOTE — PROGRESS NOTE ADULT - PROBLEM SELECTOR PLAN 4
[id number] Score 5  - HR controlled  - Will hold off on Eliquis in setting of anemia/procedure, for risk of bleeding  - Will hold rate control with metoprolol in setting of symptomatic anemia  - will restart metoprolol when anemia improves. HR controlled.
as per family, patient has been more forgetful this week which can be due to symptomatic anemia vs infectious process. Patient is currently AAOx4  - tsh, b12 wnl, As reported by pt she has been forgetful for few months w/ short term memory loss, ?early dementia vs ?psych consult for r/o depression.   - PT evaluation.
[id number] Score 5  - HR controlled  - Will hold off on Eliquis in setting of anemia/procedure, for risk of bleeding  - Will hold rate control with metoprolol in setting of symptomatic anemia  - will restart metoprolol when anemia improves. HR controlled.
[id number] Score 5  - No issues presently  - Will hold off on eliquis in setting of anemia   - Will hold rate control with metoprolol in setting of symptomatic anemia  - Suggest restarting metoprolol when anemia improves
[id number] Score 5  - HR controlled  - Will hold off on eliquis in setting of anemia, for risk of bleeding  - Will hold rate control with metoprolol in setting of symptomatic anemia  - Suggest restarting metoprolol when anemia improves
[id number] Score 5  - HR controlled  - Will hold off on Eliquis in setting of anemia/procedure, for risk of bleeding  - Will hold rate control with metoprolol in setting of symptomatic anemia  - will restart metoprolol when anemia improves. HR controlled.
[id number] Score 5  - HR controlled  - Will hold off on Eliquis in setting of anemia/procedure, for risk of bleeding  - Will hold rate control with metoprolol in setting of symptomatic anemia  - will restart metoprolol when anemia improves. HR controlled.
as per family, patient has been more forgetful this week which can be due to symptomatic anemia vs infectious process. Patient is currently AAOx4  - tsh, b12 wnl, As reported by pt she has been forgetful for few months w/ short term memory loss, ?early dementia vs ?psych consult for r/o depression.   - PT evaluation.
[id number] Score 5  - HR controlled  - Will hold off on Eliquis in setting of anemia/procedure, for risk of bleeding  - Will hold rate control with metoprolol in setting of symptomatic anemia  - Suggest restarting metoprolol when anemia improves. HR controlled.

## 2017-12-08 NOTE — PROGRESS NOTE ADULT - SUBJECTIVE AND OBJECTIVE BOX
Patient is a 72y old  Female who presents with a chief complaint of fatigue (29 Nov 2017 15:46)      SUBJECTIVE / OVERNIGHT EVENTS:    MEDICATIONS  (STANDING):  apixaban 5 milliGRAM(s) Oral every 12 hours  gabapentin 600 milliGRAM(s) Oral <User Schedule>  magnesium oxide 400 milliGRAM(s) Oral three times a day with meals  montelukast 10 milliGRAM(s) Oral daily  piperacillin/tazobactam IVPB. 3.375 Gram(s) IV Intermittent every 8 hours    MEDICATIONS  (PRN):  buDESOnide 160 MICROgram(s)/formoterol 4.5 MICROgram(s) Inhaler 2 Puff(s) Inhalation two times a day PRN shortness of breath  oxyCODONE    IR 10 milliGRAM(s) Oral every 6 hours PRN Severe Pain (7 - 10)      Vital Signs Last 24 Hrs  T(C): 36.8 (08 Dec 2017 13:34), Max: 37 (07 Dec 2017 21:05)  T(F): 98.3 (08 Dec 2017 13:34), Max: 98.6 (07 Dec 2017 21:05)  HR: 65 (08 Dec 2017 13:34) (62 - 74)  BP: 110/62 (08 Dec 2017 13:34) (108/49 - 118/49)  BP(mean): --  RR: 17 (08 Dec 2017 13:34) (17 - 18)  SpO2: 100% (08 Dec 2017 13:34) (100% - 100%)  CAPILLARY BLOOD GLUCOSE        I&O's Summary    07 Dec 2017 07:01  -  08 Dec 2017 07:00  --------------------------------------------------------  IN: 0 mL / OUT: 10 mL / NET: -10 mL        PHYSICAL EXAM:  GENERAL: NAD, well-developed  HEAD:  Atraumatic, Normocephalic  EYES: EOMI, PERRLA, conjunctiva and sclera clear  NECK: Supple, No JVD  CHEST/LUNG: Clear to auscultation bilaterally; No wheeze  HEART: Regular rate and rhythm; No murmurs, rubs, or gallops  ABDOMEN: Soft, Nontender, Nondistended; Bowel sounds present  EXTREMITIES:  2+ Peripheral Pulses, No clubbing, cyanosis, or edema  PSYCH: AAOx3  NEUROLOGY: non-focal  SKIN: No rashes or lesions    LABS:                        8.6    8.14  )-----------( 389      ( 08 Dec 2017 06:04 )             28.4     12-08    143  |  103  |  8   ----------------------------<  90  3.5   |  32<H>  |  0.45<L>    Ca    8.7      08 Dec 2017 06:04  Mg     1.2     12-08                RADIOLOGY & ADDITIONAL TESTS:    Imaging Personally Reviewed:    Consultant(s) Notes Reviewed:      Care Discussed with Consultants/Other Providers: Patient is a 72y old  Female who presents with a chief complaint of fatigue (29 Nov 2017 15:46)      SUBJECTIVE / OVERNIGHT EVENTS:  Patient has no new complaints. Denies cp, SOB, abdominal pain, N/V/D     MEDICATIONS  (STANDING):  apixaban 5 milliGRAM(s) Oral every 12 hours  gabapentin 600 milliGRAM(s) Oral <User Schedule>  magnesium oxide 400 milliGRAM(s) Oral three times a day with meals  montelukast 10 milliGRAM(s) Oral daily  piperacillin/tazobactam IVPB. 3.375 Gram(s) IV Intermittent every 8 hours    MEDICATIONS  (PRN):  buDESOnide 160 MICROgram(s)/formoterol 4.5 MICROgram(s) Inhaler 2 Puff(s) Inhalation two times a day PRN shortness of breath  oxyCODONE    IR 10 milliGRAM(s) Oral every 6 hours PRN Severe Pain (7 - 10)      Vital Signs Last 24 Hrs  T(C): 36.8 (08 Dec 2017 13:34), Max: 37 (07 Dec 2017 21:05)  T(F): 98.3 (08 Dec 2017 13:34), Max: 98.6 (07 Dec 2017 21:05)  HR: 65 (08 Dec 2017 13:34) (62 - 74)  BP: 110/62 (08 Dec 2017 13:34) (108/49 - 118/49)  BP(mean): --  RR: 17 (08 Dec 2017 13:34) (17 - 18)  SpO2: 100% (08 Dec 2017 13:34) (100% - 100%)  CAPILLARY BLOOD GLUCOSE        I&O's Summary    07 Dec 2017 07:01  -  08 Dec 2017 07:00  --------------------------------------------------------  IN: 0 mL / OUT: 10 mL / NET: -10 mL        PHYSICAL EXAM:  GENERAL: NAD, well-developed  HEAD:  Atraumatic, Normocephalic  EYES: EOMI, PERRLA, conjunctiva and sclera clear  NECK: Supple, No JVD  CHEST/LUNG: Clear to auscultation bilaterally; No wheeze  HEART: Regular rate and rhythm; No murmurs, rubs, or gallops  ABDOMEN: + pigtail catheter draining clearing serosanginous fluid. Soft, Nontender, Nondistended; Bowel sounds present  EXTREMITIES:  2+ Peripheral Pulses, No clubbing, cyanosis, or edema  PSYCH: AAOx3  NEUROLOGY: non-focal  SKIN: No rashes or lesions    LABS:                        8.6    8.14  )-----------( 389      ( 08 Dec 2017 06:04 )             28.4     12-08    143  |  103  |  8   ----------------------------<  90  3.5   |  32<H>  |  0.45<L>    Ca    8.7      08 Dec 2017 06:04  Mg     1.2     12-08                RADIOLOGY & ADDITIONAL TESTS:    Imaging Personally Reviewed:    Consultant(s) Notes Reviewed:      Care Discussed with Consultants/Other Providers: NP

## 2017-12-08 NOTE — PROGRESS NOTE ADULT - PROBLEM SELECTOR PROBLEM 7
Need for prophylactic measure
Asthma
Asthma
Need for prophylactic measure

## 2017-12-08 NOTE — PROGRESS NOTE ADULT - PROBLEM SELECTOR PROBLEM 1
Symptomatic anemia
Endometrial adenocarcinoma
Symptomatic anemia

## 2017-12-08 NOTE — PROGRESS NOTE ADULT - PROBLEM SELECTOR PLAN 6
- Continue Symbicort as needed  - Continue Singulair daily
(on Carboplatin and Taxol)  - Will continue neuropathy pain management with home oxycodone 10mg q6hr (ISTOP Reference #: [id number])  - f/u heme onc
(on Carboplatin and Taxol)  - Will continue neuropathy pain management with home oxycodone 10mg q6hr (ISTOP Reference #: [id number])  - f/u heme onc
- Continue Symbicort as needed  - Continue Singulair daily
- No issues presently  - Continue Symbicort as needed  - Continue Singulair daily
- Continue Symbicort as needed  - Continue Singulair daily

## 2017-12-08 NOTE — PROGRESS NOTE ADULT - SUBJECTIVE AND OBJECTIVE BOX
Subjective:  No CP or SOB      MEDICATIONS  (STANDING):  apixaban 5 milliGRAM(s) Oral every 12 hours  gabapentin 600 milliGRAM(s) Oral <User Schedule>  magnesium oxide 400 milliGRAM(s) Oral three times a day with meals  montelukast 10 milliGRAM(s) Oral daily  piperacillin/tazobactam IVPB. 3.375 Gram(s) IV Intermittent every 8 hours    MEDICATIONS  (PRN):  buDESOnide 160 MICROgram(s)/formoterol 4.5 MICROgram(s) Inhaler 2 Puff(s) Inhalation two times a day PRN shortness of breath  oxyCODONE    IR 10 milliGRAM(s) Oral every 6 hours PRN Severe Pain (7 - 10)      LABS:                        8.6    8.14  )-----------( 389      ( 08 Dec 2017 06:04 )             28.4     Hemoglobin: 8.6 g/dL (12-08 @ 06:04)  Hemoglobin: 8.4 g/dL (12-07 @ 05:39)  Hemoglobin: 8.4 g/dL (12-06 @ 06:20)  Hemoglobin: 8.8 g/dL (12-05 @ 06:23)  Hemoglobin: 8.0 g/dL (12-04 @ 05:45)    12-08    143  |  103  |  8   ----------------------------<  90  3.5   |  32<H>  |  0.45<L>    Ca    8.7      08 Dec 2017 06:04  Mg     1.2     12-08      Creatinine Trend: 0.45<--, 0.47<--, 0.45<--, 0.50<--, 0.49<--, 0.47<--           PHYSICAL EXAM  Vital Signs Last 24 Hrs  T(C): 36.8 (08 Dec 2017 05:16), Max: 37 (07 Dec 2017 21:05)  T(F): 98.3 (08 Dec 2017 05:16), Max: 98.6 (07 Dec 2017 21:05)  HR: 62 (08 Dec 2017 05:16) (62 - 74)  BP: 108/49 (08 Dec 2017 05:16) (108/49 - 118/49)  BP(mean): --  RR: 18 (08 Dec 2017 05:16) (17 - 18)  SpO2: 100% (08 Dec 2017 05:16) (100% - 100%)    Cardiovascular: Normal S1S2, RRR No JVD, 1/6 BELINDA, Peripheral pulses palpable 2+ B/L  Respiratory: Lungs clear to auscultation, normal effort  Gastrointestinal: Abdomen soft, ND, NT, +BS  Extremity no clubbing, cyanosis or edema B/L LE's     DIAGNOSTIC DATA    < from: CT Chest w/ IV Cont (12.01.17 @ 13:33) >  IMPRESSION:  Mural thickening of the sigmoid colon with adjacent air and fluid   collection, consistent with contained perforation.    Known endometrial cancer is difficult to delineate. Unchanged left   para-aortic lymph node.    Cavitary nodule in left upper lobe demonstrates further interval   cavitation.    Dr. Willson discussed the findings with NORMA Mccarty on December 1, 2017 at 3:57   PM.  Readback was obtained.      AMAURY BARRETT M.D., RADIOLOGY RESIDENT  This document has been electronically signed.  ALESSANDRO WILLSON M.D., ATTENDING RADIOLOGIST  This document has been electronically signed. Dec  1 2017  3:57PM    < end of copied text >    < from: CT Head No Cont (11.28.17 @ 19:35) >  IMPRESSION:   No CT evidence of acute intracranial hemorrhage, brain edema, or mass   effect. No displaced calvarial fracture.    AGUSTIN CONROY M.D., RADIOLOGY RESIDENT  This document has been electronically signed.  JIMBO BATISTA M.D., ATTENDING RADIOLOGIST  This document has been electronically signed. Nov 28 2017  8:07PM    < end of copied text >    ASSESSMENT/PLAN:   72 year old Female with hx of asthma, atrial fibrillation on Eliquis, with NL LV function on TTE 12/2016, with unremarkable plain TST 3/2016,  stage 4 endometrial carcinoma (on Carboplatin and Taxol) presenting with one day history of fatigue found to be markedly anemic, s/p 2 units PRBCs transfused, with increased confusion (negative CT head) and leukocytosis due to sigmoid colon fluid collection s/p IR drainage    --Eliquis restarted   --Metoprolol on hold.  BP/HR currently stable  - HD stable- not in CHF  --on IV Abx  hypomagnesemia   supplement as per primary team

## 2017-12-08 NOTE — PROGRESS NOTE ADULT - ATTENDING COMMENTS
72 year old female with endometrial carcinoma (On Carboplatin/Taxol; last dose 11/7/17), A-Fib on Eliquis, Asthma presented with fatigue.     In the ED was found to have WBC of 17.63. Hemoglobin was 5.5. UA neg. RVP negative. Bld cxs negative.    Became febrile. CT Chest/Abdomen/Pelvis revealed mural thickening of the sigmoid colon with adjacent air and fluid collection, consistent with contained perforation.     s/p IR drainage with drain in place. cxs in progress - with strep constellatus.    Assessment:  -Sigmoid colon abscess s/p IR drainage 12/2  -Abscess cxs in progress- with strep constellatus and bacteroides.    Recommend:  -Continue on zosyn.  -F/U drainage cxs - contacted micro lab and discussed with micro tech - cultures are still in progress.  -F/U IR regarding tube check.  -Monitor for fevers and trend WBC.    ID service available on the weekend. For questions, please call [phone].    Jesús Choi MD  Pager [phone]  After 5pm/weekends call [phone] 72 year old female with endometrial carcinoma (On Carboplatin/Taxol; last dose 11/7/17), A-Fib on Eliquis, Asthma presented with fatigue.     In the ED was found to have WBC of 17.63. Hemoglobin was 5.5. UA neg. RVP negative. Bld cxs negative.    Became febrile. CT Chest/Abdomen/Pelvis revealed mural thickening of the sigmoid colon with adjacent air and fluid collection, consistent with contained perforation.     s/p IR drainage with drain in place. cxs in progress - with strep constellatus.    Assessment:  -Sigmoid colon abscess s/p IR drainage 12/2  -Abscess cxs in progress- with strep constellatus and bacteroides.    Recommend:  -Continue on zosyn.  -F/U drainage cxs - contacted micro lab and discussed with micro tech - cultures are still in progress.  -F/U IR regarding tube check.  -Monitor for fevers and trend WBC.    Discussed with NP upon discharge can change patient to augmentin 875/125 mg PO BID to complete 12/15/2017.    ID service available on the weekend. For questions, please call [phone].    Jesús Choi MD  Pager [phone]  After 5pm/weekends call [phone]

## 2017-12-08 NOTE — PROGRESS NOTE ADULT - ASSESSMENT
72 F PMH asthma, atrial fibrillation on Eliquis, stage 4 endometrial carcinoma (on Carboplatin and Taxol) presenting symptomatic anemia- per grand dgtr pt syncopized. c/b sepsis found sigmoid colon mural fluid collection possible contained perforation s/p IR drainage 12/2/17 with percutaneous drainage catheter. 72 F PMH asthma, atrial fibrillation on Eliquis, stage 4 endometrial carcinoma (on Carboplatin and Taxol) presenting symptomatic anemia- per grand dgtr pt syncopized. c/b sepsis found sigmoid colon mural fluid collection possible contained perforation s/p IR drainage 12/2/17 with percutaneous drainage catheter.  D/c 40 minutes.

## 2017-12-08 NOTE — PROGRESS NOTE ADULT - PROBLEM SELECTOR PLAN 2
- Pt was spiking fever, with leukocytosis, and HR > 97 with fever, meet sepsis criteria. sepsis resolved.  - possible due to sigmoid colon abscess/contained perforation on CT   - c/w  zosyn, s/p IR percutaneous drainage catheter.   - so far Blood Cx/Urine Cx negative   fluid Cx grew pansensitive streptococcus constellatus  f/u ID recs. - Pt was spiking fever, with leukocytosis, and HR > 97 with fever, meet sepsis criteria. sepsis resolved.  - possible due to sigmoid colon abscess/contained perforation on CT   - c/w  zosyn, s/p IR percutaneous drainage catheter.   - so far Blood Cx/Urine Cx negative   fluid Cx grew pansensitive streptococcus constellatus and bacteroides. Official culture report to be reported today.   f/u ID recs.

## 2017-12-08 NOTE — PROGRESS NOTE ADULT - SUBJECTIVE AND OBJECTIVE BOX
CC:    Inverval History/ROS:    Allergies  No Known Allergies        ANTIMICROBIALS:  piperacillin/tazobactam IVPB. 3.375 every 8 hours      OTHER MEDS:  apixaban 5 milliGRAM(s) Oral every 12 hours  buDESOnide 160 MICROgram(s)/formoterol 4.5 MICROgram(s) Inhaler 2 Puff(s) Inhalation two times a day PRN  gabapentin 600 milliGRAM(s) Oral <User Schedule>  magnesium oxide 400 milliGRAM(s) Oral three times a day with meals  montelukast 10 milliGRAM(s) Oral daily  oxyCODONE    IR 10 milliGRAM(s) Oral every 6 hours PRN      PE:    Vital Signs Last 24 Hrs  T(C): 36.8 (08 Dec 2017 13:34), Max: 37 (07 Dec 2017 21:05)  T(F): 98.3 (08 Dec 2017 13:34), Max: 98.6 (07 Dec 2017 21:05)  HR: 65 (08 Dec 2017 13:34) (62 - 74)  BP: 110/62 (08 Dec 2017 13:34) (108/49 - 118/49)  BP(mean): --  RR: 17 (08 Dec 2017 13:34) (17 - 18)  SpO2: 100% (08 Dec 2017 13:34) (100% - 100%)    Gen: AOx3, NAD, non-toxic, pleasant  CV: S1+S2 normal, no murmurs, nontachycardic  Resp: Clear bilat, no resp distress, no crackles/wheezes  Abd: Soft, nontender, +BS  Ext: No LE edema, no wounds  : No Feng  IV/Skin: No thrombophlebitis    LABS:                          8.6    8.14  )-----------( 389      ( 08 Dec 2017 06:04 )             28.4       12-08    143  |  103  |  8   ----------------------------<  90  3.5   |  32<H>  |  0.45<L>    Ca    8.7      08 Dec 2017 06:04  Mg     1.2     12-08      MICROBIOLOGY:  v  DRAINAGE  12-02-17 --  --  Streptococcus constellatus  Bacteroides thetaiotaomicron      BLOOD PERIPHERAL  12-01-17 --  --  --      URINE MIDSTREAM  11-30-17 --  --  --      BLOOD PERIPHERAL  11-30-17 --  --  --    RADIOLOGY:    No new images. CC: F/U sigmoid abscess    Inverval History/ROS: Patient feels well today. Has no complaints.    Allergies  No Known Allergies        ANTIMICROBIALS:  piperacillin/tazobactam IVPB. 3.375 every 8 hours      OTHER MEDS:  apixaban 5 milliGRAM(s) Oral every 12 hours  buDESOnide 160 MICROgram(s)/formoterol 4.5 MICROgram(s) Inhaler 2 Puff(s) Inhalation two times a day PRN  gabapentin 600 milliGRAM(s) Oral <User Schedule>  magnesium oxide 400 milliGRAM(s) Oral three times a day with meals  montelukast 10 milliGRAM(s) Oral daily  oxyCODONE    IR 10 milliGRAM(s) Oral every 6 hours PRN      PE:    Vital Signs Last 24 Hrs  T(C): 36.8 (08 Dec 2017 13:34), Max: 37 (07 Dec 2017 21:05)  T(F): 98.3 (08 Dec 2017 13:34), Max: 98.6 (07 Dec 2017 21:05)  HR: 65 (08 Dec 2017 13:34) (62 - 74)  BP: 110/62 (08 Dec 2017 13:34) (108/49 - 118/49)  BP(mean): --  RR: 17 (08 Dec 2017 13:34) (17 - 18)  SpO2: 100% (08 Dec 2017 13:34) (100% - 100%)    Gen: AOx3, NAD, non-toxic, pleasant  CV: S1+S2 normal, no murmurs, nontachycardic  Resp: Clear bilat, no resp distress, no crackles/wheezes  Abd: Soft, nontender, +BS  Ext: No LE edema, no wounds  : No Feng  IV/Skin: No thrombophlebitis, + drain with min purulent drainage  Neuro: no focal deficits    LABS:                          8.6    8.14  )-----------( 389      ( 08 Dec 2017 06:04 )             28.4       12-08    143  |  103  |  8   ----------------------------<  90  3.5   |  32<H>  |  0.45<L>    Ca    8.7      08 Dec 2017 06:04  Mg     1.2     12-08      MICROBIOLOGY:  v  DRAINAGE  12-02-17 --  --  Streptococcus constellatus  Bacteroides thetaiotaomicron      BLOOD PERIPHERAL  12-01-17 --  --  --      URINE MIDSTREAM  11-30-17 --  --  --      BLOOD PERIPHERAL  11-30-17 --  --  --    RADIOLOGY:    No new images.

## 2017-12-08 NOTE — PROGRESS NOTE ADULT - ATTENDING COMMENTS
Agree with above.   -AC if no contraindications    Giovanny Hood MD  [facility]  [address], [address]  [place] [address]  office: [phone]  pager: [phone]

## 2017-12-08 NOTE — PROGRESS NOTE ADULT - PROBLEM SELECTOR PROBLEM 3
Perforation and abscess of large intestine concurrent with and due to diverticulitis
Perforation and abscess of large intestine concurrent with and due to diverticulitis
Alteration in cognition
Alteration in cognition
Pelvic abscess in female
Perforation and abscess of large intestine concurrent with and due to diverticulitis

## 2017-12-08 NOTE — PROGRESS NOTE ADULT - PROBLEM SELECTOR PROBLEM 4
Atrial fibrillation, unspecified type
Alteration in cognition
Alteration in cognition
Atrial fibrillation, unspecified type

## 2017-12-08 NOTE — PROGRESS NOTE ADULT - PROBLEM SELECTOR PLAN 1
multifactorial- may be chemotherapy induced in addition to blood loss - not hematuria as ua neg for blood ?if bleeding from vagina vs. rectum  guaiac negative  - s/p 2U PRBCs, monitor cbc, repeat H/H stable  - on Pad count, consider GYN consult if bleeding persistent.
Metastatic endometrial cancer - currently on carbo/taxol  -patient is considering holding off on further chemotherapy given the side effects she has had which is completely reasonable  -c/w home pain regimen for her peripheral neuropathy: gabapentin 6AM/6PM, oxycodone 10mg q6h prn  -outpatient follow-up with Dr. Salcedo upon discharge
multifactorial- may be chemotherapy induced in addition to blood loss - not hematuria as ua neg for blood ?if bleeding from vagina vs. rectum  guaiac negative  - s/p 2U PRBCs, monitor cbc  - on Pad count, consider GYN consult if bleeding persistent.
multifactorial- may be chemotherapy induced in addition to blood loss - not hematuria as ua neg for blood ?if bleeding from vagina vs. rectum  guaiac negative  - s/p 2U PRBCs, monitor cbc, repeat H/H stable  - on Pad count, consider GYN consult if bleeding persistent.
multifactorial- may be chemotherapy induced in addition to blood loss - not hematuria as ua neg for blood ?if bleeding from vagina vs. rectum  guaiac pending  - s/p 2U PRBCs   - monitor cbc
multifactorial- may be chemotherapy induced in addition to blood loss from vagina  no more vaginal bleeding.   guaiac negative  - s/p 2U PRBCs  monitoring cbc  H/H stable  - on Pad count, will consider GYN consult if bleeding persistent.
multifactorial- may be chemotherapy induced in addition to blood loss from vagina  no more vaginal bleeding.   guaiac negative  - s/p 2U PRBCs  monitoring cbc  H/H stable  - on Pad count, consider GYN consult if bleeding persistent.
multifactorial- may be chemotherapy induced in addition to blood loss from vagina  no more vaginal bleeding.   guaiac negative  - s/p 2U PRBCs  monitoring cbc  H/H stable  - on Pad count, will consider GYN consult if bleeding persistent.

## 2017-12-14 ENCOUNTER — APPOINTMENT (OUTPATIENT)
Dept: CT IMAGING | Facility: HOSPITAL | Age: 72
End: 2017-12-14

## 2017-12-14 ENCOUNTER — APPOINTMENT (OUTPATIENT)
Dept: HEMATOLOGY ONCOLOGY | Facility: CLINIC | Age: 72
End: 2017-12-14

## 2017-12-14 ENCOUNTER — OUTPATIENT (OUTPATIENT)
Dept: OUTPATIENT SERVICES | Facility: HOSPITAL | Age: 72
LOS: 1 days | End: 2017-12-14
Payer: COMMERCIAL

## 2017-12-14 DIAGNOSIS — K65.1 PERITONEAL ABSCESS: ICD-10-CM

## 2017-12-14 PROCEDURE — 74150 CT ABDOMEN W/O CONTRAST: CPT | Mod: 26

## 2017-12-14 PROCEDURE — 49424 ASSESS CYST CONTRAST INJECT: CPT

## 2017-12-14 PROCEDURE — 76080 X-RAY EXAM OF FISTULA: CPT | Mod: 26

## 2017-12-19 ENCOUNTER — OUTPATIENT (OUTPATIENT)
Dept: OUTPATIENT SERVICES | Facility: HOSPITAL | Age: 72
LOS: 1 days | Discharge: HOME | End: 2017-12-19

## 2017-12-19 DIAGNOSIS — C54.1 MALIGNANT NEOPLASM OF ENDOMETRIUM: ICD-10-CM

## 2017-12-19 DIAGNOSIS — Z43.4 ENCOUNTER FOR ATTENTION TO OTHER ARTIFICIAL OPENINGS OF DIGESTIVE TRACT: ICD-10-CM

## 2017-12-19 DIAGNOSIS — R18.8 OTHER ASCITES: ICD-10-CM

## 2017-12-19 DIAGNOSIS — K63.1 PERFORATION OF INTESTINE (NONTRAUMATIC): ICD-10-CM

## 2017-12-19 DIAGNOSIS — K65.1 PERITONEAL ABSCESS: ICD-10-CM

## 2017-12-21 ENCOUNTER — RESULT REVIEW (OUTPATIENT)
Age: 72
End: 2017-12-21

## 2017-12-21 ENCOUNTER — APPOINTMENT (OUTPATIENT)
Dept: HEMATOLOGY ONCOLOGY | Facility: CLINIC | Age: 72
End: 2017-12-21
Payer: COMMERCIAL

## 2017-12-21 VITALS
OXYGEN SATURATION: 98 % | RESPIRATION RATE: 16 BRPM | BODY MASS INDEX: 18.97 KG/M2 | TEMPERATURE: 98.8 F | DIASTOLIC BLOOD PRESSURE: 80 MMHG | SYSTOLIC BLOOD PRESSURE: 133 MMHG | HEART RATE: 68 BPM | WEIGHT: 99.21 LBS

## 2017-12-21 LAB
HCT VFR BLD CALC: 27.1 % — LOW (ref 34.5–45)
HGB BLD-MCNC: 8.8 G/DL — LOW (ref 11.5–15.5)
MCHC RBC-ENTMCNC: 29.6 PG — SIGNIFICANT CHANGE UP (ref 27–34)
MCHC RBC-ENTMCNC: 32.4 G/DL — SIGNIFICANT CHANGE UP (ref 32–36)
MCV RBC AUTO: 91.3 FL — SIGNIFICANT CHANGE UP (ref 80–100)
PLATELET # BLD AUTO: 248 K/UL — SIGNIFICANT CHANGE UP (ref 150–400)
RBC # BLD: 2.97 M/UL — LOW (ref 3.8–5.2)
RBC # FLD: 17.2 % — HIGH (ref 10.3–14.5)
WBC # BLD: 13.8 K/UL — HIGH (ref 3.8–10.5)
WBC # FLD AUTO: 13.8 K/UL — HIGH (ref 3.8–10.5)

## 2017-12-21 PROCEDURE — 99214 OFFICE O/P EST MOD 30 MIN: CPT

## 2017-12-21 RX ORDER — AMOXICILLIN AND CLAVULANATE POTASSIUM 875; 125 MG/1; MG/1
875-125 TABLET, COATED ORAL
Qty: 14 | Refills: 0 | Status: COMPLETED | COMMUNITY
Start: 2017-12-08

## 2017-12-22 LAB
ALBUMIN SERPL ELPH-MCNC: 3.4 G/DL
ALP BLD-CCNC: 61 U/L
ALT SERPL-CCNC: 13 U/L
ANION GAP SERPL CALC-SCNC: 14 MMOL/L
AST SERPL-CCNC: 15 U/L
BILIRUB SERPL-MCNC: 0.3 MG/DL
BUN SERPL-MCNC: 12 MG/DL
CALCIUM SERPL-MCNC: 9.2 MG/DL
CANCER AG125 SERPL-ACNC: 23 U/ML
CEA SERPL-MCNC: 1.4 NG/ML
CHLORIDE SERPL-SCNC: 101 MMOL/L
CO2 SERPL-SCNC: 28 MMOL/L
CREAT SERPL-MCNC: 0.54 MG/DL
GLUCOSE SERPL-MCNC: 91 MG/DL
MAGNESIUM SERPL-MCNC: 1.2 MG/DL
POTASSIUM SERPL-SCNC: 4 MMOL/L
PROT SERPL-MCNC: 8 G/DL
SODIUM SERPL-SCNC: 143 MMOL/L

## 2017-12-30 LAB — FUNGUS SPEC QL CULT: SIGNIFICANT CHANGE UP

## 2018-01-02 ENCOUNTER — RX RENEWAL (OUTPATIENT)
Age: 73
End: 2018-01-02

## 2018-01-10 DIAGNOSIS — Z00.00 ENCOUNTER FOR GENERAL ADULT MEDICAL EXAMINATION W/OUT ABNORMAL FINDINGS: ICD-10-CM

## 2018-01-11 ENCOUNTER — APPOINTMENT (OUTPATIENT)
Dept: INFECTIOUS DISEASE | Facility: CLINIC | Age: 73
End: 2018-01-11

## 2018-01-18 DIAGNOSIS — Z87.440 PERSONAL HISTORY OF URINARY (TRACT) INFECTIONS: ICD-10-CM

## 2018-01-18 LAB — BACTERIA UR CULT: ABNORMAL

## 2018-01-18 RX ORDER — CEPHALEXIN 500 MG/1
500 TABLET ORAL
Qty: 10 | Refills: 0 | Status: ACTIVE | COMMUNITY
Start: 2018-01-18 | End: 1900-01-01

## 2018-02-02 RX ORDER — GABAPENTIN 600 MG/1
600 TABLET, COATED ORAL TWICE DAILY
Qty: 60 | Refills: 3 | Status: ACTIVE | COMMUNITY
Start: 2018-02-02 | End: 1900-01-01

## 2018-02-02 RX ORDER — GABAPENTIN 600 MG/1
600 TABLET, COATED ORAL
Qty: 60 | Refills: 0 | Status: DISCONTINUED | COMMUNITY
Start: 2017-12-08 | End: 2018-02-02

## 2018-02-16 ENCOUNTER — OUTPATIENT (OUTPATIENT)
Dept: OUTPATIENT SERVICES | Facility: HOSPITAL | Age: 73
LOS: 1 days | Discharge: HOME | End: 2018-02-16

## 2018-02-16 DIAGNOSIS — C54.1 MALIGNANT NEOPLASM OF ENDOMETRIUM: ICD-10-CM

## 2018-02-20 ENCOUNTER — RESULT REVIEW (OUTPATIENT)
Age: 73
End: 2018-02-20

## 2018-02-20 ENCOUNTER — APPOINTMENT (OUTPATIENT)
Dept: HEMATOLOGY ONCOLOGY | Facility: CLINIC | Age: 73
End: 2018-02-20
Payer: MEDICARE

## 2018-02-20 VITALS
BODY MASS INDEX: 19.61 KG/M2 | DIASTOLIC BLOOD PRESSURE: 57 MMHG | OXYGEN SATURATION: 100 % | WEIGHT: 102.49 LBS | HEART RATE: 66 BPM | TEMPERATURE: 99.1 F | RESPIRATION RATE: 16 BRPM | SYSTOLIC BLOOD PRESSURE: 137 MMHG

## 2018-02-20 DIAGNOSIS — C54.1 MALIGNANT NEOPLASM OF ENDOMETRIUM: ICD-10-CM

## 2018-02-20 LAB
HCT VFR BLD CALC: 25.4 % — LOW (ref 34.5–45)
HGB BLD-MCNC: 7.9 G/DL — LOW (ref 11.5–15.5)
MCHC RBC-ENTMCNC: 28.3 PG — SIGNIFICANT CHANGE UP (ref 27–34)
MCHC RBC-ENTMCNC: 31.3 G/DL — LOW (ref 32–36)
MCV RBC AUTO: 90.5 FL — SIGNIFICANT CHANGE UP (ref 80–100)
PLATELET # BLD AUTO: 338 K/UL — SIGNIFICANT CHANGE UP (ref 150–400)
RBC # BLD: 2.81 M/UL — LOW (ref 3.8–5.2)
RBC # FLD: 17.6 % — HIGH (ref 10.3–14.5)
WBC # BLD: 10 K/UL — SIGNIFICANT CHANGE UP (ref 3.8–10.5)
WBC # FLD AUTO: 10 K/UL — SIGNIFICANT CHANGE UP (ref 3.8–10.5)

## 2018-02-20 PROCEDURE — 99214 OFFICE O/P EST MOD 30 MIN: CPT

## 2018-02-23 LAB
ALBUMIN SERPL ELPH-MCNC: 3 G/DL
ALP BLD-CCNC: 64 U/L
ALT SERPL-CCNC: 10 U/L
ANION GAP SERPL CALC-SCNC: 11 MMOL/L
AST SERPL-CCNC: 14 U/L
BILIRUB SERPL-MCNC: 0.3 MG/DL
BUN SERPL-MCNC: 15 MG/DL
CALCIUM SERPL-MCNC: 9.1 MG/DL
CANCER AG125 SERPL-ACNC: 20 U/ML
CEA SERPL-MCNC: 0.8 NG/ML
CHLORIDE SERPL-SCNC: 102 MMOL/L
CO2 SERPL-SCNC: 30 MMOL/L
CREAT SERPL-MCNC: 0.63 MG/DL
GLUCOSE SERPL-MCNC: 86 MG/DL
POTASSIUM SERPL-SCNC: 3.7 MMOL/L
PROT SERPL-MCNC: 8.5 G/DL
SODIUM SERPL-SCNC: 143 MMOL/L

## 2018-03-05 RX ORDER — OXYCODONE 10 MG/1
10 TABLET ORAL
Qty: 60 | Refills: 0 | Status: ACTIVE | COMMUNITY
Start: 2017-09-29 | End: 1900-01-01

## 2018-03-14 ENCOUNTER — INPATIENT (INPATIENT)
Facility: HOSPITAL | Age: 73
LOS: 5 days | Discharge: HOME | End: 2018-03-20
Attending: HOSPITALIST | Admitting: HOSPITALIST
Payer: MEDICARE

## 2018-03-14 VITALS
TEMPERATURE: 99 F | OXYGEN SATURATION: 100 % | SYSTOLIC BLOOD PRESSURE: 138 MMHG | HEART RATE: 79 BPM | DIASTOLIC BLOOD PRESSURE: 54 MMHG | RESPIRATION RATE: 17 BRPM

## 2018-03-14 NOTE — ED ADULT TRIAGE NOTE - CHIEF COMPLAINT QUOTE
pt with endometrial ca, last chemo in november, vaginal bleeding, abd pain and b/l lower leg pain. pt ambulates with walker 2/2 pain. aox4, in NAD

## 2018-03-15 DIAGNOSIS — I48.91 UNSPECIFIED ATRIAL FIBRILLATION: ICD-10-CM

## 2018-03-15 DIAGNOSIS — A41.9 SEPSIS, UNSPECIFIED ORGANISM: ICD-10-CM

## 2018-03-15 DIAGNOSIS — I10 ESSENTIAL (PRIMARY) HYPERTENSION: ICD-10-CM

## 2018-03-15 DIAGNOSIS — N39.0 URINARY TRACT INFECTION, SITE NOT SPECIFIED: ICD-10-CM

## 2018-03-15 DIAGNOSIS — C54.1 MALIGNANT NEOPLASM OF ENDOMETRIUM: ICD-10-CM

## 2018-03-15 DIAGNOSIS — Z29.9 ENCOUNTER FOR PROPHYLACTIC MEASURES, UNSPECIFIED: ICD-10-CM

## 2018-03-15 DIAGNOSIS — Z98.890 OTHER SPECIFIED POSTPROCEDURAL STATES: Chronic | ICD-10-CM

## 2018-03-15 DIAGNOSIS — R31.9 HEMATURIA, UNSPECIFIED: ICD-10-CM

## 2018-03-15 LAB
ALBUMIN SERPL ELPH-MCNC: 2.8 G/DL — LOW (ref 3.3–5)
ALP SERPL-CCNC: 73 U/L — SIGNIFICANT CHANGE UP (ref 40–120)
ALT FLD-CCNC: 10 U/L — SIGNIFICANT CHANGE UP (ref 4–33)
APPEARANCE UR: CLEAR — SIGNIFICANT CHANGE UP
APTT BLD: 30.1 SEC — SIGNIFICANT CHANGE UP (ref 27.5–37.4)
AST SERPL-CCNC: 27 U/L — SIGNIFICANT CHANGE UP (ref 4–32)
BACTERIA # UR AUTO: SIGNIFICANT CHANGE UP
BASE EXCESS BLDV CALC-SCNC: 3.9 MMOL/L — SIGNIFICANT CHANGE UP
BASE EXCESS BLDV CALC-SCNC: 5.7 MMOL/L — SIGNIFICANT CHANGE UP
BASOPHILS # BLD AUTO: 0.05 K/UL — SIGNIFICANT CHANGE UP (ref 0–0.2)
BASOPHILS NFR BLD AUTO: 0.4 % — SIGNIFICANT CHANGE UP (ref 0–2)
BILIRUB SERPL-MCNC: 0.5 MG/DL — SIGNIFICANT CHANGE UP (ref 0.2–1.2)
BILIRUB UR-MCNC: NEGATIVE — SIGNIFICANT CHANGE UP
BLD GP AB SCN SERPL QL: NEGATIVE — SIGNIFICANT CHANGE UP
BLOOD GAS VENOUS - CREATININE: 0.47 MG/DL — LOW (ref 0.5–1.3)
BLOOD GAS VENOUS - CREATININE: 0.47 MG/DL — LOW (ref 0.5–1.3)
BLOOD UR QL VISUAL: HIGH
BUN SERPL-MCNC: 13 MG/DL — SIGNIFICANT CHANGE UP (ref 7–23)
CALCIUM SERPL-MCNC: 8.8 MG/DL — SIGNIFICANT CHANGE UP (ref 8.4–10.5)
CHLORIDE BLDV-SCNC: 101 MMOL/L — SIGNIFICANT CHANGE UP (ref 96–108)
CHLORIDE BLDV-SCNC: 98 MMOL/L — SIGNIFICANT CHANGE UP (ref 96–108)
CHLORIDE SERPL-SCNC: 100 MMOL/L — SIGNIFICANT CHANGE UP (ref 98–107)
CO2 SERPL-SCNC: 27 MMOL/L — SIGNIFICANT CHANGE UP (ref 22–31)
COLOR SPEC: SIGNIFICANT CHANGE UP
CREAT SERPL-MCNC: 0.63 MG/DL — SIGNIFICANT CHANGE UP (ref 0.5–1.3)
EOSINOPHIL # BLD AUTO: 0.02 K/UL — SIGNIFICANT CHANGE UP (ref 0–0.5)
EOSINOPHIL NFR BLD AUTO: 0.1 % — SIGNIFICANT CHANGE UP (ref 0–6)
GAS PNL BLDV: 136 MMOL/L — SIGNIFICANT CHANGE UP (ref 136–146)
GAS PNL BLDV: 139 MMOL/L — SIGNIFICANT CHANGE UP (ref 136–146)
GLUCOSE BLDV-MCNC: 108 — HIGH (ref 70–99)
GLUCOSE BLDV-MCNC: 47 — LOW (ref 70–99)
GLUCOSE SERPL-MCNC: 90 MG/DL — SIGNIFICANT CHANGE UP (ref 70–99)
GLUCOSE UR-MCNC: NEGATIVE — SIGNIFICANT CHANGE UP
HCO3 BLDV-SCNC: 27 MMOL/L — SIGNIFICANT CHANGE UP (ref 20–27)
HCO3 BLDV-SCNC: 28 MMOL/L — HIGH (ref 20–27)
HCT VFR BLD CALC: 26.9 % — LOW (ref 34.5–45)
HCT VFR BLDV CALC: 24.7 % — LOW (ref 34.5–45)
HCT VFR BLDV CALC: 26 % — LOW (ref 34.5–45)
HGB BLD-MCNC: 7.9 G/DL — LOW (ref 11.5–15.5)
HGB BLDV-MCNC: 7.9 G/DL — LOW (ref 11.5–15.5)
HGB BLDV-MCNC: 8.4 G/DL — LOW (ref 11.5–15.5)
IMM GRANULOCYTES # BLD AUTO: 0.09 # — SIGNIFICANT CHANGE UP
IMM GRANULOCYTES NFR BLD AUTO: 0.6 % — SIGNIFICANT CHANGE UP (ref 0–1.5)
INR BLD: 1.59 — HIGH (ref 0.88–1.17)
KETONES UR-MCNC: NEGATIVE — SIGNIFICANT CHANGE UP
LACTATE BLDV-MCNC: 2.5 MMOL/L — HIGH (ref 0.5–2)
LACTATE BLDV-MCNC: 5.6 MMOL/L — CRITICAL HIGH (ref 0.5–2)
LEUKOCYTE ESTERASE UR-ACNC: HIGH
LYMPHOCYTES # BLD AUTO: 1.52 K/UL — SIGNIFICANT CHANGE UP (ref 1–3.3)
LYMPHOCYTES # BLD AUTO: 10.9 % — LOW (ref 13–44)
MCHC RBC-ENTMCNC: 26.1 PG — LOW (ref 27–34)
MCHC RBC-ENTMCNC: 29.4 % — LOW (ref 32–36)
MCV RBC AUTO: 88.8 FL — SIGNIFICANT CHANGE UP (ref 80–100)
MONOCYTES # BLD AUTO: 0.96 K/UL — HIGH (ref 0–0.9)
MONOCYTES NFR BLD AUTO: 6.9 % — SIGNIFICANT CHANGE UP (ref 2–14)
MUCOUS THREADS # UR AUTO: SIGNIFICANT CHANGE UP
NEUTROPHILS # BLD AUTO: 11.28 K/UL — HIGH (ref 1.8–7.4)
NEUTROPHILS NFR BLD AUTO: 81.1 % — HIGH (ref 43–77)
NITRITE UR-MCNC: NEGATIVE — SIGNIFICANT CHANGE UP
NRBC # FLD: 0 — SIGNIFICANT CHANGE UP
PCO2 BLDV: 51 MMHG — SIGNIFICANT CHANGE UP (ref 41–51)
PCO2 BLDV: 65 MMHG — HIGH (ref 41–51)
PH BLDV: 7.29 PH — LOW (ref 7.32–7.43)
PH BLDV: 7.4 PH — SIGNIFICANT CHANGE UP (ref 7.32–7.43)
PH UR: 6 — SIGNIFICANT CHANGE UP (ref 4.6–8)
PLATELET # BLD AUTO: 353 K/UL — SIGNIFICANT CHANGE UP (ref 150–400)
PMV BLD: 9 FL — SIGNIFICANT CHANGE UP (ref 7–13)
PO2 BLDV: 31 MMHG — LOW (ref 35–40)
PO2 BLDV: < 24 MMHG — LOW (ref 35–40)
POTASSIUM BLDV-SCNC: 3.1 MMOL/L — LOW (ref 3.4–4.5)
POTASSIUM BLDV-SCNC: 3.5 MMOL/L — SIGNIFICANT CHANGE UP (ref 3.4–4.5)
POTASSIUM SERPL-MCNC: 4.6 MMOL/L — SIGNIFICANT CHANGE UP (ref 3.5–5.3)
POTASSIUM SERPL-SCNC: 4.6 MMOL/L — SIGNIFICANT CHANGE UP (ref 3.5–5.3)
PROT SERPL-MCNC: 9.1 G/DL — HIGH (ref 6–8.3)
PROT UR-MCNC: 20 MG/DL — SIGNIFICANT CHANGE UP
PROTHROM AB SERPL-ACNC: 18.4 SEC — HIGH (ref 9.8–13.1)
RBC # BLD: 3.03 M/UL — LOW (ref 3.8–5.2)
RBC # FLD: 17.2 % — HIGH (ref 10.3–14.5)
RBC CASTS # UR COMP ASSIST: >50 — HIGH (ref 0–?)
RH IG SCN BLD-IMP: POSITIVE — SIGNIFICANT CHANGE UP
SAO2 % BLDV: 26.6 % — LOW (ref 60–85)
SAO2 % BLDV: 40.1 % — LOW (ref 60–85)
SODIUM SERPL-SCNC: 140 MMOL/L — SIGNIFICANT CHANGE UP (ref 135–145)
SP GR SPEC: 1.01 — SIGNIFICANT CHANGE UP (ref 1–1.04)
SQUAMOUS # UR AUTO: SIGNIFICANT CHANGE UP
UROBILINOGEN FLD QL: NORMAL MG/DL — SIGNIFICANT CHANGE UP
WBC # BLD: 13.92 K/UL — HIGH (ref 3.8–10.5)
WBC # FLD AUTO: 13.92 K/UL — HIGH (ref 3.8–10.5)
WBC UR QL: HIGH (ref 0–?)

## 2018-03-15 PROCEDURE — 99223 1ST HOSP IP/OBS HIGH 75: CPT | Mod: AI,GC

## 2018-03-15 PROCEDURE — 74177 CT ABD & PELVIS W/CONTRAST: CPT | Mod: 26

## 2018-03-15 RX ORDER — GABAPENTIN 400 MG/1
600 CAPSULE ORAL
Qty: 0 | Refills: 0 | Status: DISCONTINUED | OUTPATIENT
Start: 2018-03-15 | End: 2018-03-20

## 2018-03-15 RX ORDER — MORPHINE SULFATE 50 MG/1
2 CAPSULE, EXTENDED RELEASE ORAL EVERY 6 HOURS
Qty: 0 | Refills: 0 | Status: DISCONTINUED | OUTPATIENT
Start: 2018-03-15 | End: 2018-03-16

## 2018-03-15 RX ORDER — METOPROLOL TARTRATE 50 MG
25 TABLET ORAL
Qty: 0 | Refills: 0 | Status: DISCONTINUED | OUTPATIENT
Start: 2018-03-15 | End: 2018-03-20

## 2018-03-15 RX ORDER — ACETAMINOPHEN 500 MG
650 TABLET ORAL EVERY 6 HOURS
Qty: 0 | Refills: 0 | Status: DISCONTINUED | OUTPATIENT
Start: 2018-03-15 | End: 2018-03-15

## 2018-03-15 RX ORDER — HYDROMORPHONE HYDROCHLORIDE 2 MG/ML
1 INJECTION INTRAMUSCULAR; INTRAVENOUS; SUBCUTANEOUS ONCE
Qty: 0 | Refills: 0 | Status: DISCONTINUED | OUTPATIENT
Start: 2018-03-15 | End: 2018-03-15

## 2018-03-15 RX ORDER — MORPHINE SULFATE 50 MG/1
2 CAPSULE, EXTENDED RELEASE ORAL EVERY 6 HOURS
Qty: 0 | Refills: 0 | Status: DISCONTINUED | OUTPATIENT
Start: 2018-03-15 | End: 2018-03-15

## 2018-03-15 RX ORDER — OXYCODONE HYDROCHLORIDE 5 MG/1
10 TABLET ORAL EVERY 4 HOURS
Qty: 0 | Refills: 0 | Status: DISCONTINUED | OUTPATIENT
Start: 2018-03-15 | End: 2018-03-20

## 2018-03-15 RX ORDER — DEXTROSE 50 % IN WATER 50 %
50 SYRINGE (ML) INTRAVENOUS ONCE
Qty: 0 | Refills: 0 | Status: DISCONTINUED | OUTPATIENT
Start: 2018-03-15 | End: 2018-03-15

## 2018-03-15 RX ORDER — INFLUENZA VIRUS VACCINE 15; 15; 15; 15 UG/.5ML; UG/.5ML; UG/.5ML; UG/.5ML
0.5 SUSPENSION INTRAMUSCULAR ONCE
Qty: 0 | Refills: 0 | Status: DISCONTINUED | OUTPATIENT
Start: 2018-03-15 | End: 2018-03-20

## 2018-03-15 RX ORDER — ACETAMINOPHEN 500 MG
650 TABLET ORAL EVERY 6 HOURS
Qty: 0 | Refills: 0 | Status: DISCONTINUED | OUTPATIENT
Start: 2018-03-15 | End: 2018-03-20

## 2018-03-15 RX ORDER — VANCOMYCIN HCL 1 G
1000 VIAL (EA) INTRAVENOUS ONCE
Qty: 0 | Refills: 0 | Status: COMPLETED | OUTPATIENT
Start: 2018-03-15 | End: 2018-03-15

## 2018-03-15 RX ORDER — MORPHINE SULFATE 50 MG/1
4 CAPSULE, EXTENDED RELEASE ORAL ONCE
Qty: 0 | Refills: 0 | Status: DISCONTINUED | OUTPATIENT
Start: 2018-03-15 | End: 2018-03-15

## 2018-03-15 RX ORDER — SODIUM CHLORIDE 9 MG/ML
1000 INJECTION INTRAMUSCULAR; INTRAVENOUS; SUBCUTANEOUS ONCE
Qty: 0 | Refills: 0 | Status: COMPLETED | OUTPATIENT
Start: 2018-03-15 | End: 2018-03-15

## 2018-03-15 RX ORDER — METOPROLOL TARTRATE 50 MG
1 TABLET ORAL
Qty: 0 | Refills: 0 | COMMUNITY

## 2018-03-15 RX ORDER — PIPERACILLIN AND TAZOBACTAM 4; .5 G/20ML; G/20ML
3.38 INJECTION, POWDER, LYOPHILIZED, FOR SOLUTION INTRAVENOUS EVERY 8 HOURS
Qty: 0 | Refills: 0 | Status: DISCONTINUED | OUTPATIENT
Start: 2018-03-15 | End: 2018-03-20

## 2018-03-15 RX ORDER — APIXABAN 2.5 MG/1
5 TABLET, FILM COATED ORAL EVERY 12 HOURS
Qty: 0 | Refills: 0 | Status: DISCONTINUED | OUTPATIENT
Start: 2018-03-15 | End: 2018-03-16

## 2018-03-15 RX ORDER — CEFTRIAXONE 500 MG/1
1 INJECTION, POWDER, FOR SOLUTION INTRAMUSCULAR; INTRAVENOUS ONCE
Qty: 0 | Refills: 0 | Status: COMPLETED | OUTPATIENT
Start: 2018-03-15 | End: 2018-03-15

## 2018-03-15 RX ADMIN — OXYCODONE HYDROCHLORIDE 10 MILLIGRAM(S): 5 TABLET ORAL at 19:30

## 2018-03-15 RX ADMIN — OXYCODONE HYDROCHLORIDE 10 MILLIGRAM(S): 5 TABLET ORAL at 18:28

## 2018-03-15 RX ADMIN — Medication 650 MILLIGRAM(S): at 16:31

## 2018-03-15 RX ADMIN — SODIUM CHLORIDE 1000 MILLILITER(S): 9 INJECTION INTRAMUSCULAR; INTRAVENOUS; SUBCUTANEOUS at 10:07

## 2018-03-15 RX ADMIN — Medication 250 MILLIGRAM(S): at 09:17

## 2018-03-15 RX ADMIN — HYDROMORPHONE HYDROCHLORIDE 1 MILLIGRAM(S): 2 INJECTION INTRAMUSCULAR; INTRAVENOUS; SUBCUTANEOUS at 09:18

## 2018-03-15 RX ADMIN — MORPHINE SULFATE 4 MILLIGRAM(S): 50 CAPSULE, EXTENDED RELEASE ORAL at 02:50

## 2018-03-15 RX ADMIN — MORPHINE SULFATE 4 MILLIGRAM(S): 50 CAPSULE, EXTENDED RELEASE ORAL at 05:12

## 2018-03-15 RX ADMIN — PIPERACILLIN AND TAZOBACTAM 25 GRAM(S): 4; .5 INJECTION, POWDER, LYOPHILIZED, FOR SOLUTION INTRAVENOUS at 18:28

## 2018-03-15 RX ADMIN — Medication 650 MILLIGRAM(S): at 15:36

## 2018-03-15 RX ADMIN — CEFTRIAXONE 100 GRAM(S): 500 INJECTION, POWDER, FOR SOLUTION INTRAMUSCULAR; INTRAVENOUS at 06:07

## 2018-03-15 RX ADMIN — HYDROMORPHONE HYDROCHLORIDE 1 MILLIGRAM(S): 2 INJECTION INTRAMUSCULAR; INTRAVENOUS; SUBCUTANEOUS at 07:45

## 2018-03-15 NOTE — H&P ADULT - NSHPREVIEWOFSYSTEMS_GEN_ALL_CORE
REVIEW OF SYSTEMS    CONSTITUTIONAL:  Denies fevers or chills  HEENT: No changes to vision, no throat pain, no nasal congestion   SKIN:  Denies rash or itching.  CARDIOVASCULAR:  Denies chest pain, SANYA  RESPIRATORY:  Denies shortness of breath, cough or sputum.  GASTROINTESTINAL:  Denies anorexia, nausea, vomiting or diarrhea. +LLQ pain  GENITOURINARY:  + hematuria but denies dysuria  NEUROLOGICAL:  Denies headache, dizziness, syncope, + numbness and pain in her feet bilaterally   MUSCULOSKELETAL:  Denies muscle, back pain, joint pain or stiffness.  HEMATOLOGIC:  Denies anemia, bleeding or bruising.  LYMPHATICS:  Denies enlarged nodes.   PSYCHIATRIC:  Denies history of depression or anxiety.  ENDOCRINOLOGIC:  Denies reports of sweating, cold or heat intolerance. No polyuria or polydipsia.  ALLERGIES:  Denies history of asthma, hives, eczema or rhinitis.

## 2018-03-15 NOTE — H&P ADULT - PROBLEM SELECTOR PLAN 3
- likely secondary to endometrial mass, possibly has bladder invasion  - CBC stable from previous hospitalization, patient does not appear to be actively bleeding   - will recheck tomorrow, will order type and screen with AM labs

## 2018-03-15 NOTE — H&P ADULT - PROBLEM SELECTOR PLAN 2
- patient has stage 4 endometrial cancer, seen by gynecology oncology and is not a surgical candidate  - has been seen by palliative care in the past, in the process of obtaining medical marijuana for pain control  - heme onc consulted, will call palliative as well  - HCP is granddaughter, patient has not made decisions about code status yet and would like to discuss with her granddaughter first   - pain control with morphine, home oxycodone, and tylenol

## 2018-03-15 NOTE — H&P ADULT - ATTENDING COMMENTS
Patient seen and examined.  Case discussed with house staff.  Agree with above as edited.  Patient a/w sepsis 2/2 infected necrotic endometrial cancer.  IV zosyn. If spikes through abx, will add vancomycin and consult infectious disease.  Cxs pending  UTI with hematuria - on IV zosyn  Endometrial cancer - Oncology and gyn-onc c/s's.  Will discuss advanced care planning

## 2018-03-15 NOTE — H&P ADULT - HISTORY OF PRESENT ILLNESS
Patient is a 72 y/o F with a PMH significant for Stage IVB Endometrial CA, Atrial fibrilaltion Patient is a 74 y/o F with a PMH significant for Stage IVB Endometrial CA (s/p 7 cycles of carboplatin/taxol last chemo in November 2017), Atrial fibrillation on apixaban, and HTN who presents to the ED with 3 days of abdominal pain. The patient states that she began to experience abdominal pain about 3 days ago. It began as an intermittent sharp pain in her LLQ that was alleviated by aleve. The pain became persistent and therefore she decided to come to the ED. She endorses about a week of vaginal bleeding along with hematuria that she noticed around the same time as her abdominal pain. She denies dysuria but feels some pressure with urination and has noticed an odor with her urine. She denies any fevers, chills, CP, SOB, nausea, vomiting, or diarrhea at home. She endorses bilateral foot pain that is a chronic issue for her secondary to chemotherapy.     The patient was diagnosed with metastatic endometrial CA in April 2017 after a syncopal episode. At the time of diagnosis, she had known mets to the lungs and lytic lesions in the left 6th rib along with the R superior pubic ramus. She is not a surgical candidate and therefore was only given chemotherapy. She was hospitalized from 11/29-12/8 for sepsis secondary to perforated sigmoid diverticulitis with IR guided drainage of an abscess. Her chemotherapy has been on hold since that infection. She was last seen by Dr. Heather Salcedo at the end of February and had an initial consultation with palliative for approval for medical marijuana for her pain.     In the ED, the patient was initially afebrile but had Tmax of 101, HR , -150s/40s-50s, and 100% RA. She was given 1L of NS, one dose each of vancomycin and ceftriaxone, and morphine 4mg IV and Dilaudid 1mg IV for pain. CT abdomen and pelvis was notable for a new fluid filled cavity within the uterus with possible bowel fistulization into the uterus. Gynecology Oncology was consulted who again did not think the patient was a [id number] candidate and recommended medical management.

## 2018-03-15 NOTE — H&P ADULT - NSHPSOCIALHISTORY_GEN_ALL_CORE
The patient lives independently but has an aid who helps her around the house. Her daughter and granddaughter frequently check in and take care of her. She denies any history of smoking, alcohol, or recreational drug use. She used to work as a nurse at [employer]. She is able to ambulate with a walker.

## 2018-03-15 NOTE — ED PROVIDER NOTE - SHIFT CHANGE DETAILS
I have signed over this patient to the above attending physician. Pertinent history, physical exam findings and workup thus far in the ED have been discussed. The pending tests and plan, including GYN consult were signed over.  All questions from the above attending physician have been answered.

## 2018-03-15 NOTE — CONSULT NOTE ADULT - PROBLEM SELECTOR RECOMMENDATION 9
-Given the progressive nature of disease, patient remains a non-surgical candidate  -Recommend multidisciplinary team coordination to discuss goals of care with patient and family.  -Will continue to follow closely.  Please call with any questions.    Discussed with Dr Gela Gimenez PGY2

## 2018-03-15 NOTE — ED ADULT NURSE NOTE - OBJECTIVE STATEMENT
pt aox3; current medical problems include endometrial ca, last chemo was in november.  Pt reports vaginal bleeding, abdominal pain and bilateral lower leg pain, radiating to her feet. Reports pain at a level of 10 at this time. MD Mayberry at bedside for eval. Pt in no acute respiratory distress VSS.  22G IV placed in left forearm; labs sent. Pain medications provided as per order. Will continue to monitor/assess

## 2018-03-15 NOTE — H&P ADULT - ASSESSMENT
Patient is a 72 y/o F with a PMH significant for Stage IVB Endometrial CA (s/p 7 cycles of carboplatin/taxol last chemo in November 2017), Atrial fibrillation on apixaban, and HTN who presents to the ED with 3 days of abdominal pain, found to have worsening metastasis and hematuria associated with a possible UTI. Patient is a 72 y/o F with a PMH significant for Stage IVB Endometrial CA (s/p 7 cycles of carboplatin/taxol last chemo in November 2017), Atrial fibrillation on apixaban, and HTN who presents to the ED with 3 days of abdominal pain, found to have worsening metastasis and hematuria likely secondary to necrotic pelvic abscess.

## 2018-03-15 NOTE — ED PROVIDER NOTE - MEDICAL DECISION MAKING DETAILS
72 yo woman here w/ worsening mass in abd, dysuria, abd discomfort. Will check labs, ct a/p, ua in evaluation for pyelo vs worsening cancer related pain vs other.

## 2018-03-15 NOTE — CONSULT NOTE ADULT - ASSESSMENT
73y  LMP age 52 with Stage IVB endometrial cancer s/p 7 cycles of carbo/taxol now on chemo break presents and recent perforated colon with abdominal pain and worsening vaginal discharge.  CT scan shows progression of disease and is concerning for possible bowel fistulization into uterus.  Patient has mildly elevated WBC but is afebrile and hemodynamically stable.  Pain was controlled with Dilaudid IVP. 73y  LMP age 52 with Stage IVB endometrial cancer with only partial response to systemic chemotherapy and recent hospital admission for pelvic abscess due to sigmoid perforation.  CT scan shows progression of disease and is concerning for possible bowel fistulization into uterus.  Patient has mildly elevated WBC but is afebrile and hemodynamically stable.  Pain was controlled with Dilaudid IVP.

## 2018-03-15 NOTE — H&P ADULT - NSHPLABSRESULTS_GEN_ALL_CORE
7.9    13.92 )-----------( 353      ( 15 Mar 2018 02:30 )             26.9         03-15    140  |  100  |  13  ----------------------------<  90  4.6   |  27  |  0.63    Ca    8.8      15 Mar 2018 02:30    TPro  9.1<H>  /  Alb  2.8<L>  /  TBili  0.5  /  DBili  x   /  AST  27  /  ALT  10  /  AlkPhos  73  03-15        Urinalysis Basic - ( 15 Mar 2018 02:20 )    Color: PLYEL / Appearance: CLEAR / S.011 / pH: 6.0  Gluc: NEGATIVE / Ketone: NEGATIVE  / Bili: NEGATIVE / Urobili: NORMAL mg/dL   Blood: MODERATE / Protein: 20 mg/dL / Nitrite: NEGATIVE   Leuk Esterase: LARGE / RBC: >50 / WBC 5-10   Sq Epi: OCC / Non Sq Epi: x / Bacteria: FEW        < from: CT Abdomen and Pelvis w/ IV Cont (03.15.18 @ 06:41) >      IMPRESSION:     New large fluid and air-filled cavity within the central portionof the   uterus measuring 10.4 x 6.6 x 9.1 cm. This may represent an expanded   endometrial cavity related to an abscess although necrotic infected tumor   is also possible.    Increased heterogeneous enlargement of the lower uterine segment/cervix   likely indicates extension of the neoplasm.    Enlarging left pelvic sidewall and left internal iliac necrotic nodes.    < end of copied text >

## 2018-03-15 NOTE — ED ADULT NURSE REASSESSMENT NOTE - NS ED NURSE REASSESS COMMENT FT1
patient is A&Ox4, states pain has been resolved since Dilaudid administration, received Vanco and 1L NS, repeat blood gas sent, able to have breakfast, perineal and comfort care provided, IV access lft ac 22g clean/dry/intact and patent, will continue to monitor

## 2018-03-15 NOTE — CONSULT NOTE ADULT - SUBJECTIVE AND OBJECTIVE BOX
73y  LMP age 52 with Stage IVB endometrial cancer s/p 7 cycles of carbo/taxol now on chemo break presents with worsening LLQ abdominal pain.  Patient is s/p admission - for sepsis secondary to perforated diverticulitis of sigmoid colon with IR drainage of abscess.  She has been on a chemo break since this time.  Patient complains of increase in foul-smelling vaginal discharge over the past couple months.  She last saw Dr Salcedo on  and a repeat CT was ordered at that time, but had not yet been completed.      Patient presents today with worsening LLQ pain.  She first noticed the pain 3 days ago.  It had a gradual onset after patient was on her feet, walking.  It was sharp in nature and 10/10 in severity.  Pain was intermittent for the past 3 days, temporarily relieved by aleve.  Last night, patient decided to present to ER with pain returned.    ROS is negative for fevers, chills, nausea, vomiting, dysuria, diarrhea, and constipation.    OB/GYN HISTORY: , ectopic pregnancy s/p salpingectomy  PAST MEDICAL & SURGICAL HISTORY:  Atrial fibrillation, unspecified type  Endometrial cancer  HTN (hypertension)  Asthma  salpingectomy for ectopic pregnancy    Allergies No Known Drug Allergies, Latex    MEDICATIONS:  Aprepitant 80 mg, Colace, Eliquis, Gabapentin, Hctz, Klor-Con, Losartan, Magnesium oxide, Metoprolol, Montelukast, Zofran PRN, Oxycodone PRN, KCl, Prochlorperazine, symbicort, ventolin    FAMILY HISTORY:  Family history of cystic fibrosis (Uncle)  Family history of amyotrophic lateral sclerosis (Sibling)  Family history of acute leukemia (sibling)    SOCIAL HISTORY:    Name of GYN Physician: Dr Finn       Vital Signs Last 24 Hrs  T(C): 36.8 (15 Mar 2018 05:54), Max: 38.3 (15 Mar 2018 02:45)  T(F): 98.3 (15 Mar 2018 05:54), Max: 101 (15 Mar 2018 02:45)  HR: 80 (15 Mar 2018 09:04) (72 - 100)  BP: 122/55 (15 Mar 2018 09:04) (113/57 - 153/68)  BP(mean): --  RR: 16 (15 Mar 2018 09:04) (16 - 18)  SpO2: 100% (15 Mar 2018 09:04) (100% - 100%)    PHYSICAL EXAM:      Constitutional: alert and oriented x 3    Respiratory: clear    Cardiovascular: regular rate and irregularly irregular rhythm    Gastrointestinal: soft, non tender, + bowel sounds. Uterus palpated above level of umbilicus    Genitourinary: deferred at this time 2/2 known cervical mass        LABS:                        7.9    13.92 )-----------( 353      ( 15 Mar 2018 02:30 )             26.9     03-15    140  |  100  |  13  ----------------------------<  90  4.6   |  27  |  0.63    Ca    8.8      15 Mar 2018 02:30    TPro  9.1<H>  /  Alb  2.8<L>  /  TBili  0.5  /  DBili  x   /  AST  27  /  ALT  10  /  AlkPhos  73  03-15    PT/INR - ( 15 Mar 2018 02:30 )   PT: 18.4 SEC;   INR: 1.59          PTT - ( 15 Mar 2018 02:30 )  PTT:30.1 SEC  Urinalysis Basic - ( 15 Mar 2018 02:20 )    Color: PLYEL / Appearance: CLEAR / S.011 / pH: 6.0  Gluc: NEGATIVE / Ketone: NEGATIVE  / Bili: NEGATIVE / Urobili: NORMAL mg/dL   Blood: MODERATE / Protein: 20 mg/dL / Nitrite: NEGATIVE   Leuk Esterase: LARGE / RBC: >50 / WBC 5-10   Sq Epi: OCC / Non Sq Epi: x / Bacteria: FEW        Blood Type: A Positive      RADIOLOGY & ADDITIONAL STUDIES:    EXAM:  CT ABDOMEN AND PELVIS IC        PROCEDURE DATE:  Mar 15 2018         INTERPRETATION:  CLINICAL INFORMATION: Abdominal mass. Pain. Endometrial   cancer.    COMPARISON: CT abdomen pelvis 2017.    PROCEDURE:   CT of the Abdomen and Pelvis was performed with intravenous contrast.   Intravenous contrast: 90 ml Omnipaque 350. 10 ml discarded.  Oral contrast: positive contrast was administered.  Sagittal and coronal reformats were performed.    FINDINGS:    LOWER CHEST: Trace pleural effusion. Bibasilar atelectasis. Mild left   atrial enlargement.    LIVER: Within normal limits.  BILE DUCTS: Normal caliber.  GALLBLADDER: Within normal limits.  SPLEEN: Within normal limits.  PANCREAS: Within normal limits.  ADRENALS: Within normal limits.  KIDNEYS/URETERS: Mild prominence of the right renal collecting system   which may be related to ureteral compression by the enlarged uterus    BLADDER: Underdistended  REPRODUCTIVE ORGANS: Significantly enlarged uterus again noted presumably   related to multiple exophytic fibroids, possibly degenerating. There is   increased heterogeneous enlargement along the cervix compared to the   prior exam which likely indicates extension of known neoplasm. For   example on series 2, image 90, the cervix region measures 4.5 x 4.6 cm,   previously 2.2 x 2.8 cm. Within the central portion of the uterus, there   is a new large fluid and air-filled cavity measuring 10.4 x 6.6 x 9.1 cm.   Previously seen calcified fibroids are now noted along the anterior and   posterior margins of this expanded cavity. This may represent an expanded   endometrial cavity related to infection although necrotic tumor is also   possible.    BOWEL: No bowel obstruction. Compression of the sigmoid colon secondary   to pelvic mass as on prior imaging. Appendix is not definitively   visualized.  PERITONEUM: Haziness of the small bowel mesentery. Trace ascites. The   previously seen collection of air and fluid superior to the uterus is not   visualized and has been drained.  VESSELS:  Atheromatous change. Iliac vein compression due to the pelvic   mass.  RETROPERITONEUM: Enlarged retroperitoneal nodes. For reference,   aortocaval node measures 14 x 9 mm, series 2 image 50, unchanged. Bulky   left internal iliac lymph node conglomerate anterior to the left psoas   measures 1.9 x 1.6 cm, series 2 image 68, enlarged since prior imaging.   New enlarged left pelvic sidewall necrotic farnaz mass measures 4.2 x 2.8   cm (series 2, image 86).  ABDOMINAL WALL: Anasarca  BONES: Degenerative changes. Mixed density lesions along the right pubic   symphysis, left femoral head, and left sixth rib again noted, potentially   metastatic disease    IMPRESSION:     New large fluid and air-filled cavity within the central portion of the   uterus measuring 10.4 x 6.6 x 9.1 cm. This may represent an expanded   endometrial cavity related to an abscess although necrotic infected tumor   is also possible.    Increased heterogeneous enlargement of the lower uterine segment/cervix   likely indicates extension of the neoplasm.    Enlarging left pelvic sidewall and left internal iliac necrotic nodes.              JAMEEL GONZALEZ M.D., RADIOLOGY RESIDENT  This document has been electronically signed.  DOTTIE RENNER M.D., ATTENDING RADIOLOGIST  This document has been electronically signed. Mar 15 2018  7:19AM 73y  LMP age 52 with Stage IVB endometrial cancer diagnosed in the spring of 2017. She has received 7 cycles of carbo/taxol with only a partial response. She has not received chemotherapy since 10/2017.  Patient is s/p admission - for sepsis secondary to controlled perforated diverticulitis of sigmoid colon. She was treated with antibiotics and IR drainage of abscess.      Patient presents today with complains of increase in foul-smelling vaginal discharge over the past couple months as well as worsening LLQ pain.  She first noticed the pain 3 days ago.  It had a gradual onset after patient was on her feet, walking.  It was sharp in nature and 10/10 in severity.  Pain was intermittent for the past 3 days, temporarily relieved by aleve.  Last night, patient decided to present to ER after the pain returned.    ROS is negative for fevers, chills, nausea, vomiting, dysuria, diarrhea, and constipation.    OB/GYN HISTORY: , ectopic pregnancy s/p salpingectomy  PAST MEDICAL & SURGICAL HISTORY:  Atrial fibrillation, unspecified type  Endometrial cancer  HTN (hypertension)  Asthma  salpingectomy for ectopic pregnancy    Allergies No Known Drug Allergies, Latex    MEDICATIONS:  Aprepitant 80 mg, Colace, Eliquis, Gabapentin, Hctz, Klor-Con, Losartan, Magnesium oxide, Metoprolol, Montelukast, Zofran PRN, Oxycodone PRN, KCl, Prochlorperazine, symbicort, ventolin    FAMILY HISTORY:  Family history of cystic fibrosis (Uncle)  Family history of amyotrophic lateral sclerosis (Sibling)  Family history of acute leukemia (sibling)    SOCIAL HISTORY:    Name of GYN Physician: Dr Finn       Vital Signs Last 24 Hrs  T(C): 36.8 (15 Mar 2018 05:54), Max: 38.3 (15 Mar 2018 02:45)  T(F): 98.3 (15 Mar 2018 05:54), Max: 101 (15 Mar 2018 02:45)  HR: 80 (15 Mar 2018 09:04) (72 - 100)  BP: 122/55 (15 Mar 2018 09:04) (113/57 - 153/68)  BP(mean): --  RR: 16 (15 Mar 2018 09:04) (16 - 18)  SpO2: 100% (15 Mar 2018 09:04) (100% - 100%)    PHYSICAL EXAM:      Constitutional: alert and oriented x 3    Respiratory: clear    Cardiovascular: regular rate and irregularly irregular rhythm    Gastrointestinal: soft, non tender, + bowel sounds. Uterus palpated above level of umbilicus    Pelvis: NEFG. Necrotic tumor replaces cervix with foul smelling discharge and bleeding.         LABS:                        7.9    13.92 )-----------( 353      ( 15 Mar 2018 02:30 )             26.9     03-15    140  |  100  |  13  ----------------------------<  90  4.6   |  27  |  0.63    Ca    8.8      15 Mar 2018 02:30    TPro  9.1<H>  /  Alb  2.8<L>  /  TBili  0.5  /  DBili  x   /  AST  27  /  ALT  10  /  AlkPhos  73  03-15    PT/INR - ( 15 Mar 2018 02:30 )   PT: 18.4 SEC;   INR: 1.59          PTT - ( 15 Mar 2018 02:30 )  PTT:30.1 SEC  Urinalysis Basic - ( 15 Mar 2018 02:20 )    Color: PLYEL / Appearance: CLEAR / S.011 / pH: 6.0  Gluc: NEGATIVE / Ketone: NEGATIVE  / Bili: NEGATIVE / Urobili: NORMAL mg/dL   Blood: MODERATE / Protein: 20 mg/dL / Nitrite: NEGATIVE   Leuk Esterase: LARGE / RBC: >50 / WBC 5-10   Sq Epi: OCC / Non Sq Epi: x / Bacteria: FEW        Blood Type: A Positive      RADIOLOGY & ADDITIONAL STUDIES:    EXAM:  CT ABDOMEN AND PELVIS IC        PROCEDURE DATE:  Mar 15 2018         INTERPRETATION:  CLINICAL INFORMATION: Abdominal mass. Pain. Endometrial   cancer.    COMPARISON: CT abdomen pelvis 2017.    PROCEDURE:   CT of the Abdomen and Pelvis was performed with intravenous contrast.   Intravenous contrast: 90 ml Omnipaque 350. 10 ml discarded.  Oral contrast: positive contrast was administered.  Sagittal and coronal reformats were performed.    FINDINGS:    LOWER CHEST: Trace pleural effusion. Bibasilar atelectasis. Mild left   atrial enlargement.    LIVER: Within normal limits.  BILE DUCTS: Normal caliber.  GALLBLADDER: Within normal limits.  SPLEEN: Within normal limits.  PANCREAS: Within normal limits.  ADRENALS: Within normal limits.  KIDNEYS/URETERS: Mild prominence of the right renal collecting system   which may be related to ureteral compression by the enlarged uterus    BLADDER: Underdistended  REPRODUCTIVE ORGANS: Significantly enlarged uterus again noted presumably   related to multiple exophytic fibroids, possibly degenerating. There is   increased heterogeneous enlargement along the cervix compared to the   prior exam which likely indicates extension of known neoplasm. For   example on series 2, image 90, the cervix region measures 4.5 x 4.6 cm,   previously 2.2 x 2.8 cm. Within the central portion of the uterus, there   is a new large fluid and air-filled cavity measuring 10.4 x 6.6 x 9.1 cm.   Previously seen calcified fibroids are now noted along the anterior and   posterior margins of this expanded cavity. This may represent an expanded   endometrial cavity related to infection although necrotic tumor is also   possible.    BOWEL: No bowel obstruction. Compression of the sigmoid colon secondary   to pelvic mass as on prior imaging. Appendix is not definitively   visualized.  PERITONEUM: Haziness of the small bowel mesentery. Trace ascites. The   previously seen collection of air and fluid superior to the uterus is not   visualized and has been drained.  VESSELS:  Atheromatous change. Iliac vein compression due to the pelvic   mass.  RETROPERITONEUM: Enlarged retroperitoneal nodes. For reference,   aortocaval node measures 14 x 9 mm, series 2 image 50, unchanged. Bulky   left internal iliac lymph node conglomerate anterior to the left psoas   measures 1.9 x 1.6 cm, series 2 image 68, enlarged since prior imaging.   New enlarged left pelvic sidewall necrotic farnaz mass measures 4.2 x 2.8   cm (series 2, image 86).  ABDOMINAL WALL: Anasarca  BONES: Degenerative changes. Mixed density lesions along the right pubic   symphysis, left femoral head, and left sixth rib again noted, potentially   metastatic disease    IMPRESSION:     New large fluid and air-filled cavity within the central portion of the   uterus measuring 10.4 x 6.6 x 9.1 cm. This may represent an expanded   endometrial cavity related to an abscess although necrotic infected tumor   is also possible.    Increased heterogeneous enlargement of the lower uterine segment/cervix   likely indicates extension of the neoplasm.    Enlarging left pelvic sidewall and left internal iliac necrotic nodes.              JAMEEL GONZALEZ M.D., RADIOLOGY RESIDENT  This document has been electronically signed.  DOTTIE RENNER M.D., ATTENDING RADIOLOGIST  This document has been electronically signed. Mar 15 2018  7:19AM

## 2018-03-15 NOTE — ED PROVIDER NOTE - ATTENDING CONTRIBUTION TO CARE
Deedee: 72 yo female with endometrial cancer- last chemo 11/2017 c/o abdominal pain, foul smelling urine, dysuria and left flank pain since yesterday. + associated subjective fevers and chills. No chest pain, SOB, nausea, vomiting, diarrhea or constipation. Last BM earlier today and normal. Pt endorses intermittent vaginal spotting but none currently. Exam: chronically ill appearing, NAD, MMM, pink conjunctiva, MMM, no scleral icterus, +S1/S2, lungs CTA, + firm palpable periumbilical mass with TTP. +Bs x 4. NO CVA TTP. No LE edema. chronically decreased sensation in b/l feet. A/P- 72 yo female with endometrial cancer, with abdominal pain and dysuria. will obtain cbc, cmp, lipase, vbg, u/a and culture and ct abdomen. will treat pain and reassess.

## 2018-03-15 NOTE — ED PROVIDER NOTE - OBJECTIVE STATEMENT
72 yo woman w/ h/o endometrial ca here w/ abd pain and dysuria. Has had these symptoms for several days, associated w/ gradual increase in abd size. Subjective fever at home. Reports mild vaginal spotting, which has been intermittent and longstanding. Denies vomiting, diarrhea.

## 2018-03-15 NOTE — H&P ADULT - PROBLEM SELECTOR PLAN 1
- patient with leukocytosis, fever, tachycardia, and elevated lactate initially on admission  - source is likely necrotic abscess seen on CT abdomen/pelvis  - will treat with zosyn for now and if patient spikes fevers overnight, will add vancomycin  - will consider ID consult in the morning if patient spikes fevers overnight  - blood and urine cultures pending, follow for growth

## 2018-03-15 NOTE — ED ADULT NURSE REASSESSMENT NOTE - NS ED NURSE REASSESS COMMENT FT1
patient states pain has returned but not as strong, administered Tylenol as ordered, patient had previousely eaten lunch, is A&Ox4 and resting now, v/s WNL as documented, will continue to monitor

## 2018-03-16 DIAGNOSIS — Z51.5 ENCOUNTER FOR PALLIATIVE CARE: ICD-10-CM

## 2018-03-16 DIAGNOSIS — Z71.89 OTHER SPECIFIED COUNSELING: ICD-10-CM

## 2018-03-16 DIAGNOSIS — G89.3 NEOPLASM RELATED PAIN (ACUTE) (CHRONIC): ICD-10-CM

## 2018-03-16 DIAGNOSIS — K59.00 CONSTIPATION, UNSPECIFIED: ICD-10-CM

## 2018-03-16 LAB
BACTERIA UR CULT: SIGNIFICANT CHANGE UP
BLD GP AB SCN SERPL QL: NEGATIVE — SIGNIFICANT CHANGE UP
BUN SERPL-MCNC: 11 MG/DL — SIGNIFICANT CHANGE UP (ref 7–23)
CALCIUM SERPL-MCNC: 8.8 MG/DL — SIGNIFICANT CHANGE UP (ref 8.4–10.5)
CHLORIDE SERPL-SCNC: 99 MMOL/L — SIGNIFICANT CHANGE UP (ref 98–107)
CO2 SERPL-SCNC: 25 MMOL/L — SIGNIFICANT CHANGE UP (ref 22–31)
CREAT SERPL-MCNC: 0.66 MG/DL — SIGNIFICANT CHANGE UP (ref 0.5–1.3)
GLUCOSE SERPL-MCNC: 106 MG/DL — HIGH (ref 70–99)
HCT VFR BLD CALC: 23 % — LOW (ref 34.5–45)
HCT VFR BLD CALC: 24.2 % — LOW (ref 34.5–45)
HCT VFR BLD CALC: 25.5 % — LOW (ref 34.5–45)
HGB BLD-MCNC: 6.6 G/DL — CRITICAL LOW (ref 11.5–15.5)
HGB BLD-MCNC: 7.1 G/DL — LOW (ref 11.5–15.5)
HGB BLD-MCNC: 7.5 G/DL — LOW (ref 11.5–15.5)
LACTATE SERPL-SCNC: 1.1 MMOL/L — SIGNIFICANT CHANGE UP (ref 0.5–2)
MAGNESIUM SERPL-MCNC: 1.3 MG/DL — LOW (ref 1.6–2.6)
MCHC RBC-ENTMCNC: 24.8 PG — LOW (ref 27–34)
MCHC RBC-ENTMCNC: 25.4 PG — LOW (ref 27–34)
MCHC RBC-ENTMCNC: 26.5 PG — LOW (ref 27–34)
MCHC RBC-ENTMCNC: 28.7 % — LOW (ref 32–36)
MCHC RBC-ENTMCNC: 29.3 % — LOW (ref 32–36)
MCHC RBC-ENTMCNC: 29.4 % — LOW (ref 32–36)
MCV RBC AUTO: 86.4 FL — SIGNIFICANT CHANGE UP (ref 80–100)
MCV RBC AUTO: 86.5 FL — SIGNIFICANT CHANGE UP (ref 80–100)
MCV RBC AUTO: 90.3 FL — SIGNIFICANT CHANGE UP (ref 80–100)
NRBC # FLD: 0 — SIGNIFICANT CHANGE UP
PHOSPHATE SERPL-MCNC: 3.9 MG/DL — SIGNIFICANT CHANGE UP (ref 2.5–4.5)
PLATELET # BLD AUTO: 281 K/UL — SIGNIFICANT CHANGE UP (ref 150–400)
PLATELET # BLD AUTO: 320 K/UL — SIGNIFICANT CHANGE UP (ref 150–400)
PLATELET # BLD AUTO: 331 K/UL — SIGNIFICANT CHANGE UP (ref 150–400)
PMV BLD: 8.6 FL — SIGNIFICANT CHANGE UP (ref 7–13)
PMV BLD: 9 FL — SIGNIFICANT CHANGE UP (ref 7–13)
PMV BLD: 9.1 FL — SIGNIFICANT CHANGE UP (ref 7–13)
POTASSIUM SERPL-MCNC: 3.4 MMOL/L — LOW (ref 3.5–5.3)
POTASSIUM SERPL-SCNC: 3.4 MMOL/L — LOW (ref 3.5–5.3)
RBC # BLD: 2.66 M/UL — LOW (ref 3.8–5.2)
RBC # BLD: 2.68 M/UL — LOW (ref 3.8–5.2)
RBC # BLD: 2.95 M/UL — LOW (ref 3.8–5.2)
RBC # FLD: 17.3 % — HIGH (ref 10.3–14.5)
RBC # FLD: 17.5 % — HIGH (ref 10.3–14.5)
RBC # FLD: 17.6 % — HIGH (ref 10.3–14.5)
RH IG SCN BLD-IMP: POSITIVE — SIGNIFICANT CHANGE UP
SODIUM SERPL-SCNC: 140 MMOL/L — SIGNIFICANT CHANGE UP (ref 135–145)
SPECIMEN SOURCE: SIGNIFICANT CHANGE UP
WBC # BLD: 10.31 K/UL — SIGNIFICANT CHANGE UP (ref 3.8–10.5)
WBC # BLD: 11.39 K/UL — HIGH (ref 3.8–10.5)
WBC # BLD: 9.78 K/UL — SIGNIFICANT CHANGE UP (ref 3.8–10.5)
WBC # FLD AUTO: 10.31 K/UL — SIGNIFICANT CHANGE UP (ref 3.8–10.5)
WBC # FLD AUTO: 11.39 K/UL — HIGH (ref 3.8–10.5)
WBC # FLD AUTO: 9.78 K/UL — SIGNIFICANT CHANGE UP (ref 3.8–10.5)

## 2018-03-16 PROCEDURE — 99233 SBSQ HOSP IP/OBS HIGH 50: CPT | Mod: GC

## 2018-03-16 PROCEDURE — 99222 1ST HOSP IP/OBS MODERATE 55: CPT

## 2018-03-16 PROCEDURE — 99222 1ST HOSP IP/OBS MODERATE 55: CPT | Mod: GC

## 2018-03-16 PROCEDURE — 99497 ADVNCD CARE PLAN 30 MIN: CPT | Mod: 25

## 2018-03-16 PROCEDURE — 99223 1ST HOSP IP/OBS HIGH 75: CPT

## 2018-03-16 RX ORDER — POTASSIUM CHLORIDE 20 MEQ
40 PACKET (EA) ORAL ONCE
Qty: 0 | Refills: 0 | Status: COMPLETED | OUTPATIENT
Start: 2018-03-16 | End: 2018-03-16

## 2018-03-16 RX ORDER — MAGNESIUM OXIDE 400 MG ORAL TABLET 241.3 MG
400 TABLET ORAL ONCE
Qty: 0 | Refills: 0 | Status: COMPLETED | OUTPATIENT
Start: 2018-03-16 | End: 2018-03-16

## 2018-03-16 RX ADMIN — OXYCODONE HYDROCHLORIDE 10 MILLIGRAM(S): 5 TABLET ORAL at 15:30

## 2018-03-16 RX ADMIN — Medication 40 MILLIEQUIVALENT(S): at 14:04

## 2018-03-16 RX ADMIN — PIPERACILLIN AND TAZOBACTAM 25 GRAM(S): 4; .5 INJECTION, POWDER, LYOPHILIZED, FOR SOLUTION INTRAVENOUS at 14:04

## 2018-03-16 RX ADMIN — GABAPENTIN 600 MILLIGRAM(S): 400 CAPSULE ORAL at 17:49

## 2018-03-16 RX ADMIN — Medication 25 MILLIGRAM(S): at 17:49

## 2018-03-16 RX ADMIN — OXYCODONE HYDROCHLORIDE 10 MILLIGRAM(S): 5 TABLET ORAL at 22:36

## 2018-03-16 RX ADMIN — Medication 25 MILLIGRAM(S): at 05:52

## 2018-03-16 RX ADMIN — PIPERACILLIN AND TAZOBACTAM 25 GRAM(S): 4; .5 INJECTION, POWDER, LYOPHILIZED, FOR SOLUTION INTRAVENOUS at 05:52

## 2018-03-16 RX ADMIN — Medication 650 MILLIGRAM(S): at 05:59

## 2018-03-16 RX ADMIN — PIPERACILLIN AND TAZOBACTAM 25 GRAM(S): 4; .5 INJECTION, POWDER, LYOPHILIZED, FOR SOLUTION INTRAVENOUS at 22:06

## 2018-03-16 RX ADMIN — Medication 650 MILLIGRAM(S): at 06:26

## 2018-03-16 RX ADMIN — APIXABAN 5 MILLIGRAM(S): 2.5 TABLET, FILM COATED ORAL at 05:52

## 2018-03-16 RX ADMIN — GABAPENTIN 600 MILLIGRAM(S): 400 CAPSULE ORAL at 05:51

## 2018-03-16 RX ADMIN — MAGNESIUM OXIDE 400 MG ORAL TABLET 400 MILLIGRAM(S): 241.3 TABLET ORAL at 14:04

## 2018-03-16 RX ADMIN — OXYCODONE HYDROCHLORIDE 10 MILLIGRAM(S): 5 TABLET ORAL at 22:06

## 2018-03-16 RX ADMIN — OXYCODONE HYDROCHLORIDE 10 MILLIGRAM(S): 5 TABLET ORAL at 14:10

## 2018-03-16 NOTE — CONSULT NOTE ADULT - PROBLEM SELECTOR RECOMMENDATION 4
Patient admitted for severe, worsening pain secondary to malignancy. Reviewed pain regimen with patient who feels her pain is now under adequate control. Discussed with patient assigning a health care proxy and patient delegated her granddaughter as the HCP. HCP signed and placed in the chart. Awaiting oncology consult today to discuss what possible next steps for treatment would be. Patient understanding of diagnosis/prognosis, but awaiting to speak with oncology team. Patient admitted for severe, worsening pain secondary to malignancy. Reviewed pain regimen with patient who feels her pain is now under adequate control. Discussed with patient assigning a health care proxy and patient delegated her granddaughter as the HCP. HCP signed and placed in the chart. Awaiting oncology consult today to discuss what possible next steps for treatment would be. Patient understanding of diagnosis/prognosis, but awaiting to speak with oncology team.  Spoke with patient about what her understanding of the disease process was told she would not be a candidate for further disease modifying therapy. She seemed understanding but appeared withdrawn. Hospice was discussed with her, and she was considering it, but needed to speak to her granddaughter first. 30 minutes was spent discussing advance care planning with the patient.

## 2018-03-16 NOTE — PROGRESS NOTE ADULT - SUBJECTIVE AND OBJECTIVE BOX
Anupama Carpio MD  Medicine Team 2  Pager [phone]      Chief Complaint:         Subjective        VITAL SIGNS:  Vital Signs Last 24 Hrs  T(C): 37.4 (16 Mar 2018 05:44), Max: 37.4 (16 Mar 2018 05:44)  T(F): 99.4 (16 Mar 2018 05:44), Max: 99.4 (16 Mar 2018 05:44)  HR: 100 (16 Mar 2018 05:44) (72 - 100)  BP: 145/78 (16 Mar 2018 05:44) (117/57 - 145/78)  BP(mean): --  RR: 18 (16 Mar 2018 05:44) (15 - 18)  SpO2: 99% (16 Mar 2018 05:44) (96% - 100%)      PHYSICAL EXAM:     GENERAL: no acute distress  HEENT: PERRLA, EOMI, moist oropharynx   RESPIRATORY: CTAB, no w/c   CARDIOVASCULAR: RRR, no murmurs, gallops, rubs  ABDOMINAL: soft, non-tender, non-distended, positive bowel sounds   EXTREMITIES: no clubbing, cyanosis, or edema  NEUROLOGICAL: alert and oriented x 3, non-focal  SKIN: no rashes or lesions   MUSCULOSKELETAL: no gross joint deformity                          7.9    13.92 )-----------( 353      ( 15 Mar 2018 02:30 )             26.9     03-15    140  |  100  |  13  ----------------------------<  90  4.6   |  27  |  0.63    Ca    8.8      15 Mar 2018 02:30    TPro  9.1<H>  /  Alb  2.8<L>  /  TBili  0.5  /  DBili  x   /  AST  27  /  ALT  10  /  AlkPhos  73  03-15      CAPILLARY BLOOD GLUCOSE      POCT Blood Glucose.: 121 mg/dL (15 Mar 2018 10:00)      MEDICATIONS  (STANDING):  apixaban 5 milliGRAM(s) Oral every 12 hours  gabapentin 600 milliGRAM(s) Oral two times a day  influenza   Vaccine 0.5 milliLiter(s) IntraMuscular once  metoprolol     tartrate 25 milliGRAM(s) Oral two times a day  piperacillin/tazobactam IVPB. 3.375 Gram(s) IV Intermittent every 8 hours    MEDICATIONS  (PRN):  acetaminophen   Tablet. 650 milliGRAM(s) Oral every 6 hours PRN Mild Pain (1 - 3)  morphine  - Injectable 2 milliGRAM(s) IV Push every 6 hours PRN Severe Pain (7 - 10)  oxyCODONE    IR 10 milliGRAM(s) Oral every 4 hours PRN Moderate Pain (4 - 6) Anupama Carpio MD  Medicine Team 2  Pager [phone]      Patient is a 73y old  Female who presents with a chief complaint of Abdominal pain (15 Mar 2018 13:59)          Subjective: No acute events overnight. Patient is awake and alert this morning. She         VITAL SIGNS:  Vital Signs Last 24 Hrs  T(C): 37.4 (16 Mar 2018 05:44), Max: 37.4 (16 Mar 2018 05:44)  T(F): 99.4 (16 Mar 2018 05:44), Max: 99.4 (16 Mar 2018 05:44)  HR: 100 (16 Mar 2018 05:44) (72 - 100)  BP: 145/78 (16 Mar 2018 05:44) (117/57 - 145/78)  BP(mean): --  RR: 18 (16 Mar 2018 05:44) (15 - 18)  SpO2: 99% (16 Mar 2018 05:44) (96% - 100%)      PHYSICAL EXAM:     GENERAL: no acute distress  HEENT: PERRLA, EOMI, moist oropharynx   RESPIRATORY: CTAB, no w/c   CARDIOVASCULAR: RRR, no murmurs, gallops, rubs  ABDOMINAL: soft, non-tender, non-distended, positive bowel sounds   EXTREMITIES: no clubbing, cyanosis, or edema  NEUROLOGICAL: alert and oriented x 3, non-focal  SKIN: no rashes or lesions   MUSCULOSKELETAL: no gross joint deformity                          7.9    13.92 )-----------( 353      ( 15 Mar 2018 02:30 )             26.9     03-15    140  |  100  |  13  ----------------------------<  90  4.6   |  27  |  0.63    Ca    8.8      15 Mar 2018 02:30    TPro  9.1<H>  /  Alb  2.8<L>  /  TBili  0.5  /  DBili  x   /  AST  27  /  ALT  10  /  AlkPhos  73  03-15      CAPILLARY BLOOD GLUCOSE      POCT Blood Glucose.: 121 mg/dL (15 Mar 2018 10:00)      MEDICATIONS  (STANDING):  apixaban 5 milliGRAM(s) Oral every 12 hours  gabapentin 600 milliGRAM(s) Oral two times a day  influenza   Vaccine 0.5 milliLiter(s) IntraMuscular once  metoprolol     tartrate 25 milliGRAM(s) Oral two times a day  piperacillin/tazobactam IVPB. 3.375 Gram(s) IV Intermittent every 8 hours    MEDICATIONS  (PRN):  acetaminophen   Tablet. 650 milliGRAM(s) Oral every 6 hours PRN Mild Pain (1 - 3)  morphine  - Injectable 2 milliGRAM(s) IV Push every 6 hours PRN Severe Pain (7 - 10)  oxyCODONE    IR 10 milliGRAM(s) Oral every 4 hours PRN Moderate Pain (4 - 6) Anupama Carpio MD  Medicine Team 2  Pager [phone]      Patient is a 73y old  Female who presents with a chief complaint of Abdominal pain (15 Mar 2018 13:59)          Subjective: No acute events overnight. Patient is awake and alert this morning. She denies fevers, chills, CP, SOB, nausea, or vomiting. She says her LLQ pain is improved with tylenol itself. She is still having some hematuria and noticed blood on the toilet paper when she wiped.         VITAL SIGNS:  Vital Signs Last 24 Hrs  T(C): 37.4 (16 Mar 2018 05:44), Max: 37.4 (16 Mar 2018 05:44)  T(F): 99.4 (16 Mar 2018 05:44), Max: 99.4 (16 Mar 2018 05:44)  HR: 100 (16 Mar 2018 05:44) (72 - 100)  BP: 145/78 (16 Mar 2018 05:44) (117/57 - 145/78)  BP(mean): --  RR: 18 (16 Mar 2018 05:44) (15 - 18)  SpO2: 99% (16 Mar 2018 05:44) (96% - 100%)      PHYSICAL EXAM:     GENERAL: no acute distress  HEENT: PERRLA, EOMI, moist oropharynx   RESPIRATORY: CTAB, no wheezes or crackles    CARDIOVASCULAR: irregularly irregular, no murmurs   ABDOMINAL: +BS, firm uterus palpable at the umbilicus, non tender to palpation  EXTREMITIES: no clubbing, cyanosis, or edema  NEUROLOGICAL: alert and oriented x 3, no focal neurological deficits   SKIN: no rashes or lesions   MUSCULOSKELETAL: no gross joint deformity                          7.9    13.92 )-----------( 353      ( 15 Mar 2018 02:30 )             26.9     03-15    140  |  100  |  13  ----------------------------<  90  4.6   |  27  |  0.63    Ca    8.8      15 Mar 2018 02:30    TPro  9.1<H>  /  Alb  2.8<L>  /  TBili  0.5  /  DBili  x   /  AST  27  /  ALT  10  /  AlkPhos  73  03-15      CAPILLARY BLOOD GLUCOSE      POCT Blood Glucose.: 121 mg/dL (15 Mar 2018 10:00)      MEDICATIONS  (STANDING):  apixaban 5 milliGRAM(s) Oral every 12 hours  gabapentin 600 milliGRAM(s) Oral two times a day  influenza   Vaccine 0.5 milliLiter(s) IntraMuscular once  metoprolol     tartrate 25 milliGRAM(s) Oral two times a day  piperacillin/tazobactam IVPB. 3.375 Gram(s) IV Intermittent every 8 hours    MEDICATIONS  (PRN):  acetaminophen   Tablet. 650 milliGRAM(s) Oral every 6 hours PRN Mild Pain (1 - 3)  morphine  - Injectable 2 milliGRAM(s) IV Push every 6 hours PRN Severe Pain (7 - 10)  oxyCODONE    IR 10 milliGRAM(s) Oral every 4 hours PRN Moderate Pain (4 - 6)

## 2018-03-16 NOTE — PROGRESS NOTE ADULT - PROBLEM SELECTOR PLAN 2
- patient has stage 4 endometrial cancer, seen by gynecology oncology and is not a surgical candidate  - has been seen by palliative care in the past, in the process of obtaining medical marijuana for pain control  - heme onc consulted, will call palliative as well  - HCP is granddaughter, patient has not made decisions about code status yet and would like to discuss with her granddaughter first   - pain control with morphine, home oxycodone, and tylenol - patient was afebrile overnight with improvement in leukocytosis this morning   - source is likely necrotic mass in the uterus --> on zosyn day 2  - ID consulted, appreciate their recs regarding duration of treatment and appropriate regimen

## 2018-03-16 NOTE — CONSULT NOTE ADULT - ASSESSMENT
73 year-old-woman with stage IV metastatic endometrial cancer admitted yesterday with worsening, uncontrolled abdominal pain. Patient ambulatory on assessment and currently reports adequate pain management with Oxycodone and Gabapentin.

## 2018-03-16 NOTE — CONSULT NOTE ADULT - ATTENDING COMMENTS
Patient seen and examined.  Agree with above.   -Admitted with abdominal pain and vaginal bleeding being seen for management of afib  -check 12 lead ECG  -AC on hold given severe anemia and bleeding  -follow up palliative care  -can consider ac in the future if bleeding risk acceptable, no contraindications, and within goals of care    Giovanny Hood MD  [facility]  [address], [address]  [place] [address]  office: [phone]  pager: [phone]
Pt seen and examined. 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 She is now coming in with abdominal pain and foul smelling vaginal discharge, CTAP showing large amount of fluid/air in uterus with necrotic tumor and progression of disease. ID consult, palliative care consult, gyn onc consult. She is not a candidate for cancer-directed tx at this time.
72 yo with advanced metastatic stage IVB uterine cancer.   She was treated with carboplatin and paclitaxel chemotherapy with a partial response.   Imaging today shows progression of disease with a large amount of fluid and air in the uterus.   She had a recent admission for a pelvic abscess due to perforated sigmoid colon.   Current CT findings are suspicious for a fistula between the colon and uterus or may represent necrotic uterine tumor with superinfection.   She is not a candidate for hysterectomy due to her advanced metastatic disease.   We briefly discussed the current clinical and radiographic findings.   She understands that her cancer prognosis is poor and that she may not be able to receive further tumor directed therapy.   Will request a palliative care consultation.   Plan for a team/family meeting to discuss goals of care and treatment options.   All questions answered and support provided.
Patient seen and examined.  Agree with NP  note.

## 2018-03-16 NOTE — CONSULT NOTE ADULT - SUBJECTIVE AND OBJECTIVE BOX
HPI:  Patient is a 74 y/o F with a PMH significant for Stage IVB Endometrial CA (s/p 7 cycles of carboplatin/taxol last chemo in 2017), Atrial fibrillation on apixaban, and HTN who presents to the ED with 3 days of abdominal pain. The patient states that she began to experience abdominal pain about 3 days ago. It began as an intermittent sharp pain in her LLQ that was alleviated by aleve. The pain became persistent and therefore she decided to come to the ED. She endorses about a week of vaginal bleeding along with hematuria that she noticed around the same time as her abdominal pain. She denies dysuria but feels some pressure with urination and has noticed an odor with her urine. She denies any fevers, chills, CP, SOB, nausea, vomiting, or diarrhea at home. She endorses bilateral foot pain that is a chronic issue for her secondary to chemotherapy.     The patient was diagnosed with metastatic endometrial CA in 2017 after a syncopal episode. At the time of diagnosis, she had known mets to the lungs and lytic lesions in the left 6th rib along with the R superior pubic ramus. She is not a surgical candidate and therefore was only given chemotherapy. She was hospitalized from - for sepsis secondary to perforated sigmoid diverticulitis with IR guided drainage of an abscess. Her chemotherapy has been on hold since that infection. She was last seen by Dr. Heather Salcedo at the end of February and had an initial consultation with palliative for approval for medical marijuana for her pain.     CT abdomen with contrast showed worsening of metastatic endometrial tumor with necrosis. Patient consulted by gynecology/oncology on admission and was deemed not a surgical candidate.     Patient seen this morning and in no acute distress. Reports pain well managed now with Oxycodone and Gabapentin.      PERTINENT PMH REVIEWED:  [ x] YES [ ] NO           SOCIAL HISTORY:  Significant other/partner:  [ ] YES  [ x] NO            Children:  [ x] YES  [ ] NO                   Jew/Spirituality: Baptist  Substance hx:  [ ] YES   [ x] NO           Tobacco hx:  [ ] YES  [x ] NO             Alcohol hx: [ ] YES  [x ] NO        Home Opioid hx:  [ x] YES  [ ] NO   Living Situation: [ x] Home  [ ] Long term care  [ ] Rehab    FAMILY HISTORY:  Family history of cystic fibrosis (Uncle)  Family history of amyotrophic lateral sclerosis (Sibling)    [ ] Family history non contributory     BASELINE ADLs (prior to admission):  Independent [ ] moderately [ x] fully   Dependent   [ ] moderately [ ] fully    Code Status:                      MOLST  [ ] YES [x ] NO    Living Will  [ ] YES [ x] NO    Health Care Proxy [x ] YES  [ ] NO      [ ] Surrogate  [x ] HCP  [ ] Guardian: Crystal Pedroza ([facility])                                                                 Phone#: [phone]    Allergies    No Known Allergies    Intolerances        MEDICATIONS  (STANDING):  gabapentin 600 milliGRAM(s) Oral two times a day  influenza   Vaccine 0.5 milliLiter(s) IntraMuscular once  metoprolol     tartrate 25 milliGRAM(s) Oral two times a day  piperacillin/tazobactam IVPB. 3.375 Gram(s) IV Intermittent every 8 hours    MEDICATIONS  (PRN):  acetaminophen   Tablet. 650 milliGRAM(s) Oral every 6 hours PRN Mild Pain (1 - 3)  morphine  - Injectable 2 milliGRAM(s) IV Push every 6 hours PRN Severe Pain (7 - 10)  oxyCODONE    IR 10 milliGRAM(s) Oral every 4 hours PRN Moderate Pain (4 - 6)      PRESENT SYMPTOMS:  Source: [x ] Patient   [ ] Family   [ ] Team     Pain: [ x] YES [ ] NO  Onset:    chronic                Location:   abdomen; feet                     Duration:    chronic             Character:     aching/sharp; tingling       Aggravating factors: unknown                       Relieving factors: Oxycodone/Gabapentin    Radiation:    none         Timing:        intermittent                     Severity:  intermittently severe    Dyspnea: [ ] YES [x ] NO - Mild [ ]  Moderate [ ]  Severe [ ]    Anxiety: [ ] YES [x ] NO  Fatigue: [ ] YES [ x] NO   Nausea: [ ] YES [ x] NO  Loss of appetite: [x ] YES [ ] NO   Constipation: [ ] YES [x ] NO     Other Symptoms:  [x ] All other review of systems negative   [ ] Unable to obtain due to poor mentation     Does patient meet criteria for Severe Protein Calorie Malnutrition?  Yes [ ]  No [x ]  PPSV 30% or below [ ]  Anasarca [ ]  Albumin < 2 [ ] Catabolic State [ ] Poor nutritional intake [ ] Significant weight loss [ ]      Palliative Performance Status Version 2:  70%  ECOG - 2       Vital Signs Last 24 Hrs  T(C): 37.4 (16 Mar 2018 05:44), Max: 37.4 (16 Mar 2018 05:44)  T(F): 99.4 (16 Mar 2018 05:44), Max: 99.4 (16 Mar 2018 05:44)  HR: 100 (16 Mar 2018 05:44) (72 - 100)  BP: 145/78 (16 Mar 2018 05:44) (117/57 - 145/78)  BP(mean): --  RR: 18 (16 Mar 2018 05:44) (15 - 18)  SpO2: 99% (16 Mar 2018 05:44) (96% - 100%)    Physical Exam:    General: [ x] Alert,  A&O x 3    [ ] lethargic   [ ] Agitated   [ ] Cachexia   HEENT: [ x] Normal   [ ] Dry mouth   [ ] ET Tube    [ ] Trach   Lungs: [x ] Clear [ ] Rhonchi  [ ] Crackles [ ] Wheezing [ ] Tachypnea  [ ] Audible excessive secretions    Cardiovascular:  [ x] Regular rate and rhythm  [ ] Irregular [ ] Tachycardia   [ ] Bradycardia   Abdomen: [ x] Soft  [ ] Distended  [x ] +BS  [ x] Non tender [ ] Tender  [ ]PEG   [ ] NGT   Last BM: 3/15/18    Genitourinary: [x ] Normal [ ] Incontinent   [ ] Oliguria/Anuria   [ ] Feng  Musculoskeletal:  [x ] Normal   [ ] Generalized weakness  [ ] Bedbound  [ ] Edema   Neurological: [x ] No focal deficits  [ ] Cognitive impairment     Skin: [x ] Normal   [ ] Pressure ulcers     LABS:                        7.1    10.31 )-----------( 281      ( 16 Mar 2018 09:17 )             24.2     03-16    140  |  99  |  11  ----------------------------<  106<H>  3.4<L>   |  25  |  0.66    Ca    8.8      16 Mar 2018 09:17  Phos  3.9     03-16  Mg     1.3     03-16    TPro  9.1<H>  /  Alb  2.8<L>  /  TBili  0.5  /  DBili  x   /  AST  27  /  ALT  10  /  AlkPhos  73  03-15    PT/INR - ( 15 Mar 2018 02:30 )   PT: 18.4 SEC;   INR: 1.59          PTT - ( 15 Mar 2018 02:30 )  PTT:30.1 SEC  Urinalysis Basic - ( 15 Mar 2018 02:20 )    Color: PLYEL / Appearance: CLEAR / S.011 / pH: 6.0  Gluc: NEGATIVE / Ketone: NEGATIVE  / Bili: NEGATIVE / Urobili: NORMAL mg/dL   Blood: MODERATE / Protein: 20 mg/dL / Nitrite: NEGATIVE   Leuk Esterase: LARGE / RBC: >50 / WBC 5-10   Sq Epi: OCC / Non Sq Epi: x / Bacteria: FEW      I&O's Summary      RADIOLOGY & ADDITIONAL STUDIES:  IMPRESSION:     New large fluid and air-filled cavity within the central portionof the   uterus measuring 10.4 x 6.6 x 9.1 cm. This may represent an expanded   endometrial cavity related to an abscess although necrotic infected tumor   is also possible.    Increased heterogeneous enlargement of the lower uterine segment/cervix   likely indicates extension of the neoplasm.    Enlarging left pelvic sidewall and left internal iliac necrotic nodes.      Referrals:  Hospice [ ]   Chaplaincy [ ]    Child Life [ ]   Social Work [ ]   Case Management [ ]   Holistic Therapy [ ] HPI:  Patient is a 74 y/o F with a PMH significant for Stage IVB Endometrial CA (s/p 7 cycles of carboplatin/taxol last chemo in 2017), Atrial fibrillation on apixaban, and HTN who presents to the ED with 3 days of abdominal pain. The patient states that she began to experience abdominal pain about 3 days ago. It began as an intermittent sharp pain in her LLQ that was alleviated by aleve. The pain became persistent and therefore she decided to come to the ED. She endorses about a week of vaginal bleeding along with hematuria that she noticed around the same time as her abdominal pain. She denies dysuria but feels some pressure with urination and has noticed an odor with her urine. She denies any fevers, chills, CP, SOB, nausea, vomiting, or diarrhea at home. She endorses bilateral foot pain that is a chronic issue for her secondary to chemotherapy.     The patient was diagnosed with metastatic endometrial CA in 2017 after a syncopal episode. At the time of diagnosis, she had known mets to the lungs and lytic lesions in the left 6th rib along with the R superior pubic ramus. She is not a surgical candidate and therefore was only given chemotherapy. She was hospitalized from - for sepsis secondary to perforated sigmoid diverticulitis with IR guided drainage of an abscess. Her chemotherapy has been on hold since that infection. She was last seen by Dr. Heather Salcedo at the end of February and had an initial consultation with palliative for approval for medical marijuana for her pain.     CT abdomen with contrast showed worsening of metastatic endometrial tumor with necrosis. Patient consulted by gynecology/oncology on admission and was deemed not a surgical candidate. Patient seen this morning and in no acute distress. Reports pain well managed now with Oxycodone and Gabapentin.      PERTINENT PMH REVIEWED:  [ x] YES [ ] NO           SOCIAL HISTORY:  Significant other/partner:  [ ] YES  [ x] NO            Children:  [ x] YES  [ ] NO                   Hoahaoism/Spirituality: Uatsdin  Substance hx:  [ ] YES   [ x] NO           Tobacco hx:  [ ] YES  [x ] NO             Alcohol hx: [ ] YES  [x ] NO        Home Opioid hx:  [ x] YES  [ ] NO   Living Situation: [ x] Home  [ ] Long term care  [ ] Rehab    FAMILY HISTORY:  Family history of cystic fibrosis (Uncle)  Family history of amyotrophic lateral sclerosis (Sibling)    [ ] Family history non contributory     BASELINE ADLs (prior to admission):  Independent [ ] moderately [ x] fully   Dependent   [ ] moderately [ ] fully    Code Status:                      MOLST  [ ] YES [x ] NO    Living Will  [ ] YES [ x] NO    Health Care Proxy [x ] YES  [ ] NO      [ ] Surrogate  [x ] HCP  [ ] Guardian: Crystal Pedroza ([facility])                                                                 Phone#: [phone]    Allergies    No Known Allergies    Intolerances        MEDICATIONS  (STANDING):  gabapentin 600 milliGRAM(s) Oral two times a day  influenza   Vaccine 0.5 milliLiter(s) IntraMuscular once  metoprolol     tartrate 25 milliGRAM(s) Oral two times a day  piperacillin/tazobactam IVPB. 3.375 Gram(s) IV Intermittent every 8 hours    MEDICATIONS  (PRN):  acetaminophen   Tablet. 650 milliGRAM(s) Oral every 6 hours PRN Mild Pain (1 - 3)  morphine  - Injectable 2 milliGRAM(s) IV Push every 6 hours PRN Severe Pain (7 - 10)  oxyCODONE    IR 10 milliGRAM(s) Oral every 4 hours PRN Moderate Pain (4 - 6)      PRESENT SYMPTOMS:  Source: [x ] Patient   [ ] Family   [ ] Team     Pain: [ x] YES [ ] NO  Onset:    chronic                Location:   abdomen; feet                     Duration:    chronic             Character:     aching/sharp; tingling       Aggravating factors: unknown                       Relieving factors: Oxycodone/Gabapentin    Radiation:    none         Timing:        intermittent                     Severity:  intermittently severe    Dyspnea: [ ] YES [x ] NO - Mild [ ]  Moderate [ ]  Severe [ ]    Anxiety: [ ] YES [x ] NO  Fatigue: [ ] YES [ x] NO   Nausea: [ ] YES [ x] NO  Loss of appetite: [x ] YES [ ] NO   Constipation: [ ] YES [x ] NO     Other Symptoms:  [x ] All other review of systems negative   [ ] Unable to obtain due to poor mentation     Does patient meet criteria for Severe Protein Calorie Malnutrition?  Yes [ ]  No [x ]  PPSV 30% or below [ ]  Anasarca [ ]  Albumin < 2 [ ] Catabolic State [ ] Poor nutritional intake [ ] Significant weight loss [ ]      Palliative Performance Status Version 2:  70%  ECOG - 2       Vital Signs Last 24 Hrs  T(C): 37.4 (16 Mar 2018 05:44), Max: 37.4 (16 Mar 2018 05:44)  T(F): 99.4 (16 Mar 2018 05:44), Max: 99.4 (16 Mar 2018 05:44)  HR: 100 (16 Mar 2018 05:44) (72 - 100)  BP: 145/78 (16 Mar 2018 05:44) (117/57 - 145/78)  BP(mean): --  RR: 18 (16 Mar 2018 05:44) (15 - 18)  SpO2: 99% (16 Mar 2018 05:44) (96% - 100%)    Physical Exam:    General: [ x] Alert,  A&O x 3    [ ] lethargic   [ ] Agitated   [ ] Cachexia   HEENT: [ x] Normal   [ ] Dry mouth   [ ] ET Tube    [ ] Trach   Lungs: [x ] Clear [ ] Rhonchi  [ ] Crackles [ ] Wheezing [ ] Tachypnea  [ ] Audible excessive secretions    Cardiovascular:  [ x] Regular rate and rhythm  [ ] Irregular [ ] Tachycardia   [ ] Bradycardia   Abdomen: [ x] Soft  [ ] Distended  [x ] +BS  [ x] Non tender [ ] Tender  [ ]PEG   [ ] NGT   Last BM: 3/15/18    Genitourinary: [x ] Normal [ ] Incontinent   [ ] Oliguria/Anuria   [ ] Feng  Musculoskeletal:  [x ] Normal   [ ] Generalized weakness  [ ] Bedbound  [ ] Edema   Neurological: [x ] No focal deficits  [ ] Cognitive impairment     Skin: [x ] Normal   [ ] Pressure ulcers     LABS:                        7.1    10.31 )-----------( 281      ( 16 Mar 2018 09:17 )             24.2     03-16    140  |  99  |  11  ----------------------------<  106<H>  3.4<L>   |  25  |  0.66    Ca    8.8      16 Mar 2018 09:17  Phos  3.9     03-16  Mg     1.3     03-16    TPro  9.1<H>  /  Alb  2.8<L>  /  TBili  0.5  /  DBili  x   /  AST  27  /  ALT  10  /  AlkPhos  73  03-15    PT/INR - ( 15 Mar 2018 02:30 )   PT: 18.4 SEC;   INR: 1.59          PTT - ( 15 Mar 2018 02:30 )  PTT:30.1 SEC  Urinalysis Basic - ( 15 Mar 2018 02:20 )    Color: PLYEL / Appearance: CLEAR / S.011 / pH: 6.0  Gluc: NEGATIVE / Ketone: NEGATIVE  / Bili: NEGATIVE / Urobili: NORMAL mg/dL   Blood: MODERATE / Protein: 20 mg/dL / Nitrite: NEGATIVE   Leuk Esterase: LARGE / RBC: >50 / WBC 5-10   Sq Epi: OCC / Non Sq Epi: x / Bacteria: FEW      I&O's Summary      RADIOLOGY & ADDITIONAL STUDIES:  IMPRESSION:     New large fluid and air-filled cavity within the central portionof the   uterus measuring 10.4 x 6.6 x 9.1 cm. This may represent an expanded   endometrial cavity related to an abscess although necrotic infected tumor   is also possible.    Increased heterogeneous enlargement of the lower uterine segment/cervix   likely indicates extension of the neoplasm.    Enlarging left pelvic sidewall and left internal iliac necrotic nodes.      Referrals:  Hospice [ ]   Chaplaincy [ ]    Child Life [ ]   Social Work [ ]   Case Management [ ]   Holistic Therapy [ ]

## 2018-03-16 NOTE — CONSULT NOTE ADULT - SUBJECTIVE AND OBJECTIVE BOX
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     The patient states that she began to experience abdominal pain about 3 days ago. It began as an intermittent sharp pain in her LLQ that was alleviated by aleve. The pain became persistent and therefore she decided to come to the ED. She endorses about a week of vaginal discharge that she noticed around the same time as her abdominal pain. She denies dysuria but feels some pressure with urination and has noticed an odor with her urine. She denies any fevers, chills, CP, SOB, nausea, vomiting, or diarrhea at home. 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 She is ambulatory. She was last seen by Dr. Heather Salcedo at the end of February and had an initial consultation with palliative for approval for medical marijuana for her pain.     In the ED, the patient was initially afebrile but had Tmax of 101, HR , -150s/40s-50s, and 100% RA. She was given 1L of NS, one dose each of vancomycin and ceftriaxone, and morphine 4mg IV and Dilaudid 1mg IV for pain. CT abdomen and pelvis was notable for a new fluid filled cavity within the uterus with possible bowel fistulization into the uterus. Gynecology Oncology was consulted who again did not think the patient was a surgical candidate and recommended medical management. (15 Mar 2018 13:59)      PAST MEDICAL & SURGICAL HISTORY:  Atrial fibrillation, unspecified type  Endometrial cancer  HTN (hypertension)  Asthma  History of salpingectomy    Allergies    No Known Allergies    Intolerances    MEDICATIONS  (STANDING):  gabapentin 600 milliGRAM(s) Oral two times a day  influenza   Vaccine 0.5 milliLiter(s) IntraMuscular once  metoprolol     tartrate 25 milliGRAM(s) Oral two times a day  piperacillin/tazobactam IVPB. 3.375 Gram(s) IV Intermittent every 8 hours    MEDICATIONS  (PRN):  acetaminophen   Tablet. 650 milliGRAM(s) Oral every 6 hours PRN Mild Pain (1 - 3)  morphine  - Injectable 2 milliGRAM(s) IV Push every 6 hours PRN Severe Pain (7 - 10)  oxyCODONE    IR 10 milliGRAM(s) Oral every 4 hours PRN Moderate Pain (4 - 6)      FAMILY HISTORY:  Family history of cystic fibrosis (Uncle)  Family history of amyotrophic lateral sclerosis (Sibling)    SOCIAL HISTORY: No EtOH, no tobacco    REVIEW OF SYSTEMS:    CONSTITUTIONAL: + fevers, no chills, no weakness or weight loss  EYES/ENT: No visual changes  NECK: No pain or stiffness  RESPIRATORY: No shortness of breath, no cough or wheezing   CARDIOVASCULAR: No chest pain or palpitations, no dyspnea on exertion, no peripheral edema  GASTROINTESTINAL: + abdominal pain, no nausea/vomiting/diarrhea  GENITOURINARY: no dysuria, + foul smelling greenish vaginal discharge  NEUROLOGICAL: + neuropathy in b/l feet  SKIN: No rashes, no bruises, no petechiae  All other review of systems is negative unless indicated above    Height (cm): 154.94 (03-15 @ 17:17)  Weight (kg): 48.8 (03-15 @ 17:17)  BMI (kg/m2): 20.3 (03-15 @ 17:17)  BSA (m2): 1.45 (03-15 @ 17:17)    VITALS:   T(F): 99.4 (03-16-18 @ 05:44), Max: 99.4 (03-16-18 @ 05:44)  HR: 100 (03-16-18 @ 05:44)  BP: 145/78 (03-16-18 @ 05:44)  RR: 18 (03-16-18 @ 05:44)  SpO2: 99% (03-16-18 @ 05:44)  Wt(kg): --    PHYSICAL EXAM    GENERAL: NAD, well-developed  EYES: EOMI, PERRLA, conjunctiva and sclera clear  NECK: Supple, No JVD  CHEST/LUNG: Clear to auscultation bilaterally; No wheeze  HEART: Regular rate and rhythm; No murmurs, rubs, or gallops  ABDOMEN: + BS, distended but soft, NT  EXTREMITIES:  2+ Peripheral Pulses, No clubbing, cyanosis, or edema  NEUROLOGY: AAO x 3, decreased sensation b/l feet  SKIN: No rashes or lesions    LABS:                         7.1    10.31 )-----------( 281      ( 16 Mar 2018 09:17 )             24.2     03-16    140  |  99  |  11  ----------------------------<  106<H>  3.4<L>   |  25  |  0.66    Ca    8.8      16 Mar 2018 09:17  Phos  3.9     03-16  Mg     1.3     03-16    TPro  9.1<H>  /  Alb  2.8<L>  /  TBili  0.5  /  DBili  x   /  AST  27  /  ALT  10  /  AlkPhos  73  03-15    Phosphorus Level, Serum: 3.9 mg/dL (03-16 @ 09:17)  Magnesium, Serum: 1.3 mg/dL (03-16 @ 09:17)    PT/INR - ( 15 Mar 2018 02:30 )   PT: 18.4 SEC;   INR: 1.59     PTT - ( 15 Mar 2018 02:30 )  PTT:30.1 SEC    BLOOD PERIPHERAL  03-15 @ 10:32 --    NO ORGANISMS ISOLATED  NO ORGANISMS ISOLATED AT 24 HOURS  --    IMAGING:  - CT Abdomen and Pelvis w/ IV Cont (03.15.18 @ 06:41) >  IMPRESSION: New large fluid and air-filled cavity within the central portion of the uterus measuring 10.4 x 6.6 x 9.1 cm. This may represent an expanded endometrial cavity related to an abscess although necrotic infected tumor is also possible.    Increased heterogeneous enlargement of the lower uterine segment/cervix likely indicates extension of the neoplasm.    Enlarging left pelvic sidewall and left internal iliac necrotic nodes.

## 2018-03-16 NOTE — CONSULT NOTE ADULT - SUBJECTIVE AND OBJECTIVE BOX
HISTORY OF PRESENT ILLNESS: This is a 72 year old Female well known to our office with asthma, HTN, no known h/o CAD/MI, atrial fibrillation on Eliquis, with NL LV function on TTE 12/2016, with unremarkable plain TST 3/2016,  stage 4 metastatic endometrial carcinoma (to lung,left ribs, pubic ramus- not surgical candidate s/p chemo with Carboplatin and Taxol - last dose 11/17) previously admitted for sepsis 2/2 perforated sigmoid diverticulitis now admitted with abdominal pain and vaginal bleeding. She was febrile in the ED to 101. CT a/p revealed large amount of fluid/air in uterus with necrotic tumor and progression of disease.  She was found to be markedly anemic with Hgb 6. She denies any anginal symptoms, syncope , near syncope or palpitations.     PAST MEDICAL & SURGICAL HISTORY:  Atrial fibrillation, unspecified type  Endometrial cancer  HTN (hypertension)  Asthma  History of salpingectomy    	    MEDICATIONS:  metoprolol     tartrate 25 milliGRAM(s) Oral two times a day  piperacillin/tazobactam IVPB. 3.375 Gram(s) IV Intermittent every 8 hours  acetaminophen   Tablet. 650 milliGRAM(s) Oral every 6 hours PRN  gabapentin 600 milliGRAM(s) Oral two times a day  oxyCODONE    IR 10 milliGRAM(s) Oral every 4 hours PRN  influenza   Vaccine 0.5 milliLiter(s) IntraMuscular once      Allergies  No Known Allergies        FAMILY HISTORY:  no premature cad  Family history of cystic fibrosis (Uncle)  Family history of amyotrophic lateral sclerosis (Sibling)      SOCIAL HISTORY:    [+ ] Non-smoker  [ ] Smoker  [ -] Alcohol      REVIEW OF SYSTEMS:  abd pain , vaginal discharge , weakness  otherwise negative       PHYSICAL EXAM:  T(C): 37.4 (03-16-18 @ 05:44), Max: 37.4 (03-16-18 @ 05:44)  HR: 100 (03-16-18 @ 05:44) (74 - 100)  BP: 145/78 (03-16-18 @ 05:44) (117/57 - 145/78)  RR: 18 (03-16-18 @ 05:44) (16 - 18)  SpO2: 99% (03-16-18 @ 05:44) (96% - 99%)  Wt(kg): --  I&O's Summary      Appearance: Normal	  HEENT:   Normal oral mucosa, PERRL, EOMI	  Cardiovascular: Normal S1 S2, No JVD, No murmurs, No edema  Respiratory: Lungs clear to auscultation	  Psychiatry: A & O x 3, Mood & affect appropriate  Gastrointestinal:  abd tenderness on palpation  Skin: No rashes, No ecchymoses, No cyanosis	  Neurologic: Non-focal  Extremities: Normal range of motion, No clubbing, cyanosis or edema  Vascular: Peripheral pulses palpable 2+ bilaterally    TELEMETRY:  n/a  	    ECG:  	pending    RADIOLOGY:    < from: Transthoracic Echocardiogram (11.06.15 @ 15:01) >  Mitral Valve: Mitral annular calcification, otherwise  normal mitral valve. Minimal mitral regurgitation.  Aortic Root: Normal aortic root.  Aortic Valve: Calcified trileaflet aortic valve withnormal  opening.  Left Atrium: Normal left atrium.  LA volume index = 21  cc/m2.  Left Ventricle: Normal left ventricular systolic function.  No segmental wall motion abnormalities. Normal left  ventricular internal dimensions and wall thicknesses. Mild  diastolic dysfunction (Stage I).  Right Heart: Normal right atrium. Normal right ventricular  size and function. Normal tricuspid valve. Minimal  tricuspid regurgitation. Normal pulmonic valve.  Pericardium/PleuraNormal pericardium with no pericardial  effusion.    < end of copied text >  	  	  LABS:	 	                        7.1    10.31 )-----------( 281      ( 16 Mar 2018 09:17 )             24.2       03-16    140  |  99  |  11  ----------------------------<  106<H>  3.4<L>   |  25  |  0.66    Ca    8.8      16 Mar 2018 09:17  Phos  3.9     03-16  Mg     1.3     03-16    TPro  9.1<H>  /  Alb  2.8<L>  /  TBili  0.5  /  DBili  x   /  AST  27  /  ALT  10  /  AlkPhos  73  03-15      ASSESSMENT/PLAN:  This is a 72 year old Female well known to our office with asthma, HTN, no known h/o CAD/MI, atrial fibrillation on Eliquis, with NL LV function on TTE 12/2016, with unremarkable plain TST 3/2016,  stage 4 metastatic endometrial carcinoma (to lung,left ribs, pubic ramus- not surgical candidate s/p chemo with Carboplatin and Taxol - last dose 11/17) previously admitted for sepsis 2/2 perforated sigmoid diverticulitis now admitted with abdominal pain and vaginal bleeding. She was febrile in the ED to 101. CT a/p revealed large amount of fluid/air in uterus with necrotic tumor and progression of disease.  She was found to be markedly anemic with Hgb 6. She denies any anginal symptoms, syncope , near syncope or palpitations. 	  -- onc noted - not candidate for treatment surgically or with chemo at this time  -- palliative noted - possible hospice - awaiting patient/family decision   -- ID noted - +urine cx with negative blood cx to date - on IV abx  -- presently Eliquis on hold given significant anemia - occult pending - likely 2/2 vaginal bleeding/ chronic dx/infection    - if it is within GOC, given her elevated CHADS-VASC would c/w Eliquis   -- check baseline 12 lead EKG  -- clinically HR is controlled and patient is without evidence of CHF  -- f/u with Dr Becker upon dc for cardiology  -- if patient requires IR drainage of uterine fluid collection she may be considered optimized from a cardiac perspective - she is low risk for a low risk procedure per RCRI score   -- final recs pending GOC/pt decision/palliative f;/u     Francheska Tripp [facility]  O:  [phone]  P: [phone]

## 2018-03-16 NOTE — PROGRESS NOTE ADULT - PROBLEM SELECTOR PLAN 4
- rate controlled with metoprolol 25mg BID  - anticoagulated with apixaban, will continue - rate controlled with metoprolol 25mg BID  - holding apixaban in light of dropping hemaglobin

## 2018-03-16 NOTE — CONSULT NOTE ADULT - PROBLEM SELECTOR RECOMMENDATION 9
Pt with stage IVB disease, chemo on hold since Nov 2017 due to perforated sigmoid diverticulitis/abscess at that time. Now with necrotic tumor/abscess and progression of disease. Pt with stage IVB disease. She has not been able to receive chemo since Nov 2017 due to perforated sigmoid diverticulitis/abscess and now has necrotic tumor/abscess and progression of disease. Would not offer chemotherapy in setting of active infection. Spoke with Dr. Salcedo and think that pt may not be a candidate for further therapy since her course has been complicated by infections since Nov 2017. She agrees with palliative care and hospice. Pt with stage IVB disease. She has not been able to receive chemo since Nov 2017 due to perforated sigmoid diverticulitis/abscess and now has necrotic tumor/abscess and progression of disease. Would not offer chemotherapy in setting of active infection. Spoke with Dr. Salcedo and think that pt may not be a candidate for further therapy since her course has been complicated by infections since Nov 2017. She agrees with palliative care and hospice. Discussed this at length with the patient and she understands.

## 2018-03-16 NOTE — CONSULT NOTE ADULT - ASSESSMENT
73 year old female with Stage IVB Endometrial CA (s/p 7 cycles of carboplatin/taxol last chemo in November 2017), Atrial fibrillation on apixaban, and HTN who presented to the ED with 3 days of abdominal pain.     Diagnosed with metastatic endometrial cancer in April 2017 with mets to the lungs and lytic lesions in the left 6th rib along with the R superior pubic ramus.     Recently admitted 11/29-12/8 for perforated sigmoid diverticulitis with IR guided drainage    Febrile to 101F in the ED, tachycardic to 100bpm, leukocytosis to 13K, elevated lactate 5.6. CT with new large fluid and air-filled cavity in the uterus measuring 10.4 x 6.6x 9.1 cm. Differential includes sepsis from uterine abscess vs necrotic tumor vs other etiology.    Recommend:  -Continue zosyn.  -Consider IR evaluation for drainage of uterine collection and to send for cultures if drained.  -F/U blood cxs.      Jesús Choi MD  Pager [phone]  After 5pm/weekends call [phone]

## 2018-03-16 NOTE — CONSULT NOTE ADULT - PROBLEM SELECTOR RECOMMENDATION 2
Patient admitted with severe worsening abdominal pain and bilateral foot pain likely neuropathic from previous chemotherapy treatments. Patient was taking Oxycodone prn at home with adequate effect up until yesterday. Patient received Morphine and Dilaudid in the ED with good effect. Received oral Oxycodone and Gabapentin with good effect. Patient admitted with severe worsening abdominal pain and bilateral foot pain likely neuropathic from previous chemotherapy treatments. Patient was taking Oxycodone prn at home with adequate effect up until yesterday. Patient received Morphine and Dilaudid in the ED with good effect. Received oral Oxycodone and Gabapentin with good effect. Would recommend Discontinuing morphine and continuing Oxycodone 10mg q4h PRN.

## 2018-03-16 NOTE — PROGRESS NOTE ADULT - PROBLEM SELECTOR PLAN 1
- patient with leukocytosis, fever, tachycardia, and elevated lactate initially on admission  - source is likely necrotic abscess seen on CT abdomen/pelvis  - will treat with zosyn for now and if patient spikes fevers overnight, will add vancomycin  - will consider ID consult in the morning if patient spikes fevers overnight  - blood and urine cultures pending, follow for growth - likely secondary to endometrial mass, possibly has bladder invasion  - CBC with Hgb of 6.6 this AM --> repeat was 7.1  - patient is asymptomatic, will hold apixaban and recheck CBC at 5pm today to ensure no further drops  - active type and screen, blood consent is in the chart if needed

## 2018-03-16 NOTE — CONSULT NOTE ADULT - PROBLEM SELECTOR RECOMMENDATION 3
Patient reports good bowel movement last night. Given patient is on opioids, will continue to monitor and implement bowel regimen. Initiate Senna at night.

## 2018-03-16 NOTE — PROGRESS NOTE ADULT - PROBLEM SELECTOR PLAN 3
- likely secondary to endometrial mass, possibly has bladder invasion  - CBC stable from previous hospitalization, patient does not appear to be actively bleeding   - will recheck tomorrow, will order type and screen with AM labs - patient has stage 4 endometrial cancer, seen by gynecology oncology and is not a surgical candidate  - heme onc and palliative care on board and appreciate their recs   - HCP is granddaughter, patient has not made decisions about code status yet and would like to discuss with her granddaughter first   - pain control with oxycodone and tylenol, gabapentin for neuropathy

## 2018-03-16 NOTE — CONSULT NOTE ADULT - ASSESSMENT
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

## 2018-03-16 NOTE — PROGRESS NOTE ADULT - ASSESSMENT
Patient is a 74 y/o F with a PMH significant for Stage IVB Endometrial CA (s/p 7 cycles of carboplatin/taxol last chemo in November 2017), Atrial fibrillation on apixaban, and HTN who presents to the ED with 3 days of abdominal pain, found to have worsening metastasis and hematuria likely secondary to necrotic pelvic abscess.

## 2018-03-16 NOTE — CONSULT NOTE ADULT - SUBJECTIVE AND OBJECTIVE BOX
HPI:  Patient is a 74 y/o F with a PMH significant for Stage IVB Endometrial CA (s/p 7 cycles of carboplatin/taxol last chemo in 2017), Atrial fibrillation on apixaban, and HTN who presents to the ED with 3 days of abdominal pain. The patient states that she began to experience abdominal pain about 3 days ago. It began as an intermittent sharp pain in her LLQ that was alleviated by aleve. The pain became persistent and therefore she decided to come to the ED. She endorses about a week of vaginal bleeding along with hematuria that she noticed around the same time as her abdominal pain. She denies dysuria but feels some pressure with urination and has noticed an odor with her urine. She denies any fevers, chills, CP, SOB, nausea, vomiting, or diarrhea at home. She endorses bilateral foot pain that is a chronic issue for her secondary to chemotherapy.     The patient was diagnosed with metastatic endometrial CA in 2017 after a syncopal episode. At the time of diagnosis, she had known mets to the lungs and lytic lesions in the left 6th rib along with the R superior pubic ramus. She is not a surgical candidate and therefore was only given chemotherapy. She was hospitalized from - for sepsis secondary to perforated sigmoid diverticulitis with IR guided drainage of an abscess. Her chemotherapy has been on hold since that infection. She was last seen by Dr. Heather Salcedo at the end of February and had an initial consultation with palliative for approval for medical marijuana for her pain.     In the ED, the patient was initially afebrile but had Tmax of 101, HR , -150s/40s-50s, and 100% RA. She was given 1L of NS, one dose each of vancomycin and ceftriaxone, and morphine 4mg IV and Dilaudid 1mg IV for pain. CT abdomen and pelvis was notable for a new fluid filled cavity within the uterus with possible bowel fistulization into the uterus. Gynecology Oncology was consulted who again did not think the patient was a surgical candidate and recommended medical management.       PAST MEDICAL & SURGICAL HISTORY:  Atrial fibrillation, unspecified type  Endometrial cancer  HTN (hypertension)  Asthma  History of salpingectomy      Allergies    No Known Allergies    Intolerances      ANTIMICROBIALS:  piperacillin/tazobactam IVPB. 3.375 every 8 hours      OTHER MEDS:  acetaminophen   Tablet. 650 milliGRAM(s) Oral every 6 hours PRN  gabapentin 600 milliGRAM(s) Oral two times a day  influenza   Vaccine 0.5 milliLiter(s) IntraMuscular once  metoprolol     tartrate 25 milliGRAM(s) Oral two times a day  oxyCODONE    IR 10 milliGRAM(s) Oral every 4 hours PRN      SOCIAL HISTORY: Denies smoking, alcohol, drugs.    FAMILY HISTORY:  Family history of cystic fibrosis (Uncle)  Family history of amyotrophic lateral sclerosis (Sibling)      Drug Dosing Weight  Height (cm): 154.94 (15 Mar 2018 17:17)  Weight (kg): 48.8 (15 Mar 2018 17:17)  BMI (kg/m2): 20.3 (15 Mar 2018 17:17)  BSA (m2): 1.45 (15 Mar 2018 17:17)    PE:    Vital Signs Last 24 Hrs  T(C): 37.4 (16 Mar 2018 05:44), Max: 37.4 (16 Mar 2018 05:44)  T(F): 99.4 (16 Mar 2018 05:44), Max: 99.4 (16 Mar 2018 05:44)  HR: 100 (16 Mar 2018 05:44) (74 - 100)  BP: 145/78 (16 Mar 2018 05:44) (117/57 - 145/78)  BP(mean): --  RR: 18 (16 Mar 2018 05:44) (16 - 18)  SpO2: 99% (16 Mar 2018 05:44) (96% - 99%)    Gen: AOx3, NAD, non-toxic, pleasant  CV: S1+S2 normal, no murmurs  Resp: Clear bilat, no resp distress  Abd: Soft, nontender, +BS  Ext: No LE edema, no wounds  : No Feng  IV/Skin: No thrombophlebitis  Msk: No low back pain, no arthralgias, no joint swelling  Neuro: No sensory deficits, no motor deficits    LABS:                          7.1    10.31 )-----------( 281      ( 16 Mar 2018 09:17 )             24.2       03-16    140  |  99  |  11  ----------------------------<  106<H>  3.4<L>   |  25  |  0.66    Ca    8.8      16 Mar 2018 09:17  Phos  3.9     03-16  Mg     1.3     -16    TPro  9.1<H>  /  Alb  2.8<L>  /  TBili  0.5  /  DBili  x   /  AST  27  /  ALT  10  /  AlkPhos  73  -15      Urinalysis Basic - ( 15 Mar 2018 02:20 )    Color: PLYEL / Appearance: CLEAR / S.011 / pH: 6.0  Gluc: NEGATIVE / Ketone: NEGATIVE  / Bili: NEGATIVE / Urobili: NORMAL mg/dL   Blood: MODERATE / Protein: 20 mg/dL / Nitrite: NEGATIVE   Leuk Esterase: LARGE / RBC: >50 / WBC 5-10   Sq Epi: OCC / Non Sq Epi: x / Bacteria: FEW    MICROBIOLOGY:  v  BLOOD PERIPHERAL  03-15-18 --  --  --      URINE MIDSTREAM  03-15-18 --  --  --    RADIOLOGY:    < from: CT Abdomen and Pelvis w/ IV Cont (03.15.18 @ 06:41) >  IMPRESSION:     New large fluid and air-filled cavity within the central portionof the   uterus measuring 10.4 x 6.6 x 9.1 cm. This may represent an expanded   endometrial cavity related to an abscess although necrotic infected tumor   is also possible.    Increased heterogeneous enlargement of the lower uterine segment/cervix   likely indicates extension of the neoplasm.    Enlarging left pelvic sidewall and left internal iliac necrotic nodes.      < end of copied text >

## 2018-03-16 NOTE — PROGRESS NOTE ADULT - PROBLEM SELECTOR PLAN 6
- DVT ppx apixaban  - regular diet  - Dispo pending GOC, resolution of symptoms  - PT consult - holding chemical anticoagulation given anemia   - regular diet  - Dispo pending GOC, resolution of symptoms

## 2018-03-16 NOTE — CONSULT NOTE ADULT - PROBLEM SELECTOR RECOMMENDATION 9
Patient diagnosed in August 2017 with Stage IV endometrial cancer. Patient underwent 6 rounds of chemo, but had to discontinue secondary to diverticular infection with abscess. Patient seen by gynecology/oncology on admission and deemed not to be a surgical candidate at this time. Awaiting oncology consult today.

## 2018-03-17 ENCOUNTER — TRANSCRIPTION ENCOUNTER (OUTPATIENT)
Age: 73
End: 2018-03-17

## 2018-03-17 DIAGNOSIS — D50.0 IRON DEFICIENCY ANEMIA SECONDARY TO BLOOD LOSS (CHRONIC): ICD-10-CM

## 2018-03-17 LAB
HCT VFR BLD CALC: 23.9 % — LOW (ref 34.5–45)
HGB BLD-MCNC: 7.1 G/DL — LOW (ref 11.5–15.5)
MCHC RBC-ENTMCNC: 26.2 PG — LOW (ref 27–34)
MCHC RBC-ENTMCNC: 29.7 % — LOW (ref 32–36)
MCV RBC AUTO: 88.2 FL — SIGNIFICANT CHANGE UP (ref 80–100)
NRBC # FLD: 0 — SIGNIFICANT CHANGE UP
PLATELET # BLD AUTO: 331 K/UL — SIGNIFICANT CHANGE UP (ref 150–400)
PMV BLD: 9 FL — SIGNIFICANT CHANGE UP (ref 7–13)
RBC # BLD: 2.71 M/UL — LOW (ref 3.8–5.2)
RBC # FLD: 17.4 % — HIGH (ref 10.3–14.5)
WBC # BLD: 12.53 K/UL — HIGH (ref 3.8–10.5)
WBC # FLD AUTO: 12.53 K/UL — HIGH (ref 3.8–10.5)

## 2018-03-17 PROCEDURE — 99233 SBSQ HOSP IP/OBS HIGH 50: CPT | Mod: GC

## 2018-03-17 RX ADMIN — OXYCODONE HYDROCHLORIDE 10 MILLIGRAM(S): 5 TABLET ORAL at 05:17

## 2018-03-17 RX ADMIN — GABAPENTIN 600 MILLIGRAM(S): 400 CAPSULE ORAL at 18:26

## 2018-03-17 RX ADMIN — Medication 650 MILLIGRAM(S): at 06:53

## 2018-03-17 RX ADMIN — Medication 650 MILLIGRAM(S): at 17:20

## 2018-03-17 RX ADMIN — OXYCODONE HYDROCHLORIDE 10 MILLIGRAM(S): 5 TABLET ORAL at 21:50

## 2018-03-17 RX ADMIN — Medication 25 MILLIGRAM(S): at 05:17

## 2018-03-17 RX ADMIN — GABAPENTIN 600 MILLIGRAM(S): 400 CAPSULE ORAL at 05:17

## 2018-03-17 RX ADMIN — OXYCODONE HYDROCHLORIDE 10 MILLIGRAM(S): 5 TABLET ORAL at 06:53

## 2018-03-17 RX ADMIN — Medication 650 MILLIGRAM(S): at 05:25

## 2018-03-17 RX ADMIN — OXYCODONE HYDROCHLORIDE 10 MILLIGRAM(S): 5 TABLET ORAL at 17:20

## 2018-03-17 RX ADMIN — PIPERACILLIN AND TAZOBACTAM 25 GRAM(S): 4; .5 INJECTION, POWDER, LYOPHILIZED, FOR SOLUTION INTRAVENOUS at 13:51

## 2018-03-17 RX ADMIN — OXYCODONE HYDROCHLORIDE 10 MILLIGRAM(S): 5 TABLET ORAL at 21:20

## 2018-03-17 RX ADMIN — OXYCODONE HYDROCHLORIDE 10 MILLIGRAM(S): 5 TABLET ORAL at 16:21

## 2018-03-17 RX ADMIN — Medication 650 MILLIGRAM(S): at 16:29

## 2018-03-17 RX ADMIN — Medication 25 MILLIGRAM(S): at 18:26

## 2018-03-17 RX ADMIN — PIPERACILLIN AND TAZOBACTAM 25 GRAM(S): 4; .5 INJECTION, POWDER, LYOPHILIZED, FOR SOLUTION INTRAVENOUS at 05:17

## 2018-03-17 RX ADMIN — PIPERACILLIN AND TAZOBACTAM 25 GRAM(S): 4; .5 INJECTION, POWDER, LYOPHILIZED, FOR SOLUTION INTRAVENOUS at 21:20

## 2018-03-17 NOTE — DISCHARGE NOTE ADULT - CARE PLAN
Principal Discharge DX:	Endometrial cancer  Goal:	Pain management  Assessment and plan of treatment:	You have been diagnosed with endometrial cancer, and at this time, you will not be receiving further intervention. Please continue to take your pain medications as prescribed when needed. You will also be receiving hospice care at home, and a nurse will visit to evaluate your needs.  Secondary Diagnosis:	Atrial fibrillation  Goal:	Ongoing care  Assessment and plan of treatment:	Please continue to take your metoprolol and do not continue your apixaban.  Secondary Diagnosis:	Uterine infection  Assessment and plan of treatment:	You were diagnosed with an infection in your uterus. Please continue taking your antibiotics as prescribed for 14 days. If you develop fevers. chills or worsening pain, please contact a physician. Should you want further evaluation of your infection, please follow up with your doctor to obtain an MRI in 6 weeks.

## 2018-03-17 NOTE — PROGRESS NOTE ADULT - PROBLEM SELECTOR PLAN 3
- patient has stage 4 endometrial cancer, seen by gynecology oncology and is not a surgical candidate  - heme onc and palliative care on board and appreciate their recs   - HCP is granddaughter, patient has not made decisions about code status yet and would like to discuss with her granddaughter first   - pain control with oxycodone and tylenol, gabapentin for neuropathy

## 2018-03-17 NOTE — DISCHARGE NOTE ADULT - PLAN OF CARE
Pain management You have been diagnosed with endometrial cancer, and at this time, you will not be receiving further intervention. Please continue to take your pain medications as prescribed when needed. You will also be receiving hospice care at home, and a nurse will visit to evaluate your needs. Ongoing care Please continue to take your metoprolol and do not continue your apixaban. You were diagnosed with an infection in your uterus. Please continue taking your antibiotics as prescribed for 14 days. If you develop fevers. chills or worsening pain, please contact a physician. Should you want further evaluation of your infection, please follow up with your doctor to obtain an MRI in 6 weeks.

## 2018-03-17 NOTE — PROGRESS NOTE ADULT - PROBLEM SELECTOR PLAN 1
- likely secondary to endometrial mass with bladder invasion causing hematuria which has now resolved  - CBC holding at 7.1, active type and screen and signed blood consent is in the chart  - patient continues to be asymptomatic but will continue to hold apixaban

## 2018-03-17 NOTE — DISCHARGE NOTE ADULT - CARE PROVIDER_API CALL
Palomo Becker), Cardiology  [address]  [place] [address]  Phone: [phone]  Fax: [phone]    Heather Salcedo), Hematology; Internal Medicine; Medical Oncology  [address] [place] [address]  Phone: [phone]  Fax: [phone]

## 2018-03-17 NOTE — DISCHARGE NOTE ADULT - CARE PROVIDERS DIRECT ADDRESSES
,DirectAddress_Unknown,[email or web address]

## 2018-03-17 NOTE — DISCHARGE NOTE ADULT - OTHER SIGNIFICANT FINDINGS
< from: CT Abdomen and Pelvis w/ IV Cont (03.15.18 @ 06:41) >  IMPRESSION:     New large fluid and air-filled cavity within the central portionof the   uterus measuring 10.4 x 6.6 x 9.1 cm. This may represent an expanded   endometrial cavity related to an abscess although necrotic infected tumor   is also possible.    Increased heterogeneous enlargement of the lower uterine segment/cervix   likely indicates extension of the neoplasm.    Enlarging left pelvic sidewall and left internal iliac necrotic nodes.    < end of copied text >

## 2018-03-17 NOTE — PROGRESS NOTE ADULT - ASSESSMENT
Patient is a 74 y/o F with a PMH significant for Stage IVB Endometrial CA (s/p 7 cycles of carboplatin/taxol last chemo in November 2017), Atrial fibrillation on apixaban, and HTN who presents to the ED with 3 days of abdominal pain, found to have worsening metastasis and hematuria likely secondary to necrotic pelvic abscess. Patient seen by heme onc who will not be offering any more chemotherapy. Palliative care is on board and has recommended pain medications. Pending a discussion with the HCP, her granddaughter, regarding code status and the possibility of home hospice.

## 2018-03-17 NOTE — DISCHARGE NOTE ADULT - HOSPITAL COURSE
Patient is a 74 y/o F with a PMH significant for Stage IVB Endometrial CA (s/p 7 cycles of carboplatin/taxol last chemo in November 2017), Atrial fibrillation on apixaban, and HTN who presented to the ED with 3 days of abdominal pain associated with hematuria and vaginal bleeding. CT abdomen and pelvis showed that she had a air/fluid collection in her uterus which either represented necrotic tumor or an abscess. She was seen by gynecology oncology who did not offer any surgical intervention. ID was consulted and recommended IR guided drainage of this abscess but they did not feel they would be able to access it and recommended MRI if needed for better characterization. She was started on zosyn for treatment with negative blood and urine cultures. Hematology/Oncology saw the patient and give her previous history of infection, stated that they would bot be able to offer any more chemotherapy and recommended a palliative consult along with a GOC conversation. The patient decided that she would be interested in home hospice services. Patient is a 74 y/o F with a PMH significant for Stage IVB Endometrial CA (s/p 7 cycles of carboplatin/taxol last chemo in November 2017), Atrial fibrillation on apixaban, and HTN who presented to the ED with 3 days of abdominal pain associated with hematuria and vaginal bleeding. CT abdomen and pelvis showed that she had a air/fluid collection in her uterus which either represented necrotic tumor or an abscess. She was seen by gynecology oncology who did not offer any surgical intervention. ID was consulted and recommended IR guided drainage of this abscess but they did not feel they would be able to access it and recommended MRI if needed for better characterization. She was started on zosyn for treatment with negative blood and urine cultures. Hematology/Oncology saw the patient and give her previous history of infection, stated that they would bot be able to offer any more chemotherapy and recommended a palliative consult along with a GOC conversation. The patient decided that she would be interested in home hospice services and will be discharged with home hospice.    Patient will complete 14 days of antibiotics for her uterine infection and should she want to ensure resolution, and if it is in line with her goals of care, a follow up MRI can be obtained in 6 weeks. Patient is a 72 y/o F with a PMH significant for Stage IVB Endometrial CA (s/p 7 cycles of carboplatin/taxol last chemo in November 2017), Atrial fibrillation on apixaban, and HTN who presented to the ED with 3 days of abdominal pain associated with hematuria and vaginal bleeding. CT abdomen and pelvis showed that she had a air/fluid collection in her uterus which either represented necrotic tumor or an abscess. She was seen by gynecology oncology who did not offer any surgical intervention. ID was consulted and recommended IR guided drainage of this abscess but they did not feel they would be able to access it and recommended MRI if needed for better characterization. She was started on zosyn for treatment with negative blood and urine cultures. Hematology/Oncology saw the patient and give her previous history of infection, stated that they would bot be able to offer any more chemotherapy and recommended a palliative consult along with a GOC conversation. The patient decided that she would be interested in home hospice services and will be discharged with home hospice.    Patient will complete 14 days of antibiotics for her uterine infection and should she want to ensure resolution, and if it is in line with her goals of care, a follow up MRI or CT scan can be obtained in 6 weeks to assess for resolution of symptoms.

## 2018-03-17 NOTE — DISCHARGE NOTE ADULT - MEDICATION SUMMARY - MEDICATIONS TO STOP TAKING
I will STOP taking the medications listed below when I get home from the hospital:    magnesium oxide 400 mg (240 mg elemental magnesium) oral tablet  -- 1 tab(s) by mouth 2 times a day    Probiotic Formula oral capsule  -- 1 cap(s) by mouth 2 times a day   -- Check with your doctor before becoming pregnant.  Take with food.    apixaban 5 mg oral tablet  -- 1 tab(s) by mouth 2 times a day

## 2018-03-17 NOTE — PROGRESS NOTE ADULT - SUBJECTIVE AND OBJECTIVE BOX
Anupama Carpio MD  Medicine Team 2  Pager [phone]      Patient is a 73y old  Female who presents with a chief complaint of Abdominal pain (15 Mar 2018 13:59)          Subjective: Overnight patient had some LLQ pain that improved with tylenol. This morning says her hematuria has resolved but she still has some light vaginal bleeding. Had night sweats overnight but was not febrile. No CP, SOB, nausea, vomiting, or diarrhea.         VITAL SIGNS:  Vital Signs Last 24 Hrs  T(C): 36.8 (17 Mar 2018 04:56), Max: 37.3 (16 Mar 2018 17:47)  T(F): 98.3 (17 Mar 2018 04:56), Max: 99.2 (16 Mar 2018 17:47)  HR: 79 (17 Mar 2018 04:56) (67 - 95)  BP: 153/71 (17 Mar 2018 04:56) (124/64 - 153/71)  BP(mean): --  RR: 17 (17 Mar 2018 04:56) (16 - 18)  SpO2: 100% (17 Mar 2018 04:56) (98% - 100%)      PHYSICAL EXAM:     GENERAL: no acute distress, thin appearing woman  HEENT: PERRLA, EOMI, moist oropharynx   RESPIRATORY: CTAB, no wheezes or crackles   CARDIOVASCULAR: regular rate and rhythm on cardiac exam, no murmurs appreciated   ABDOMINAL: +BS, abdomen not as firm as before but firm mass still appreciated at the level of the umbilicus   EXTREMITIES: no peripheral edema appreciated   NEUROLOGICAL: alert and oriented x 3, no focal deficits   SKIN: no rashes or lesions   MUSCULOSKELETAL: no gross joint deformity                          7.1    12.53 )-----------( 331      ( 17 Mar 2018 05:35 )             23.9     03-16    140  |  99  |  11  ----------------------------<  106<H>  3.4<L>   |  25  |  0.66    Ca    8.8      16 Mar 2018 09:17  Phos  3.9     03-16  Mg     1.3     03-16        CAPILLARY BLOOD GLUCOSE          MEDICATIONS  (STANDING):  gabapentin 600 milliGRAM(s) Oral two times a day  influenza   Vaccine 0.5 milliLiter(s) IntraMuscular once  metoprolol     tartrate 25 milliGRAM(s) Oral two times a day  piperacillin/tazobactam IVPB. 3.375 Gram(s) IV Intermittent every 8 hours    MEDICATIONS  (PRN):  acetaminophen   Tablet. 650 milliGRAM(s) Oral every 6 hours PRN Mild Pain (1 - 3)  oxyCODONE    IR 10 milliGRAM(s) Oral every 4 hours PRN Moderate Pain (4 - 6)

## 2018-03-17 NOTE — PROGRESS NOTE ADULT - PROBLEM SELECTOR PLAN 6
- holding chemical anticoagulation given anemia   - regular diet  - Dispo pending GOC, resolution of symptoms

## 2018-03-17 NOTE — DISCHARGE NOTE ADULT - PATIENT PORTAL LINK FT
You can access the [facility] Patient Portal, offered by [facility], by registering with the following website: [email or web address]

## 2018-03-17 NOTE — PROGRESS NOTE ADULT - SUBJECTIVE AND OBJECTIVE BOX
Subjective:   	 no chest pain   no shortness of breath            o/n events note  light vaginal bleeding  and sweats      MEDICATIONS:  MEDICATIONS  (STANDING):  gabapentin 600 milliGRAM(s) Oral two times a day  influenza   Vaccine 0.5 milliLiter(s) IntraMuscular once  metoprolol     tartrate 25 milliGRAM(s) Oral two times a day  piperacillin/tazobactam IVPB. 3.375 Gram(s) IV Intermittent every 8 hours    MEDICATIONS  (PRN):  acetaminophen   Tablet. 650 milliGRAM(s) Oral every 6 hours PRN Mild Pain (1 - 3)  oxyCODONE    IR 10 milliGRAM(s) Oral every 4 hours PRN Moderate Pain (4 - 6)      LABS:	 	    CARDIAC MARKERS:                                7.1    12.53 )-----------( 331      ( 17 Mar 2018 05:35 )             23.9     03-16    140  |  99  |  11  ----------------------------<  106<H>  3.4<L>   |  25  |  0.66    Ca    8.8      16 Mar 2018 09:17  Phos  3.9     03-16  Mg     1.3     03-16      COAGS:       proBNP:   Lipid Profile:   HgA1c:   TSH:       PHYSICAL EXAM:  T(C): 36.8 (03-17-18 @ 04:56), Max: 37.3 (03-16-18 @ 17:47)  HR: 79 (03-17-18 @ 04:56) (67 - 95)  BP: 153/71 (03-17-18 @ 04:56) (124/64 - 153/71)  RR: 17 (03-17-18 @ 04:56) (16 - 18)  SpO2: 100% (03-17-18 @ 04:56) (98% - 100%)  Wt(kg): --  I&O's Summary    16 Mar 2018 07:01  -  17 Mar 2018 07:00  --------------------------------------------------------  IN: 200 mL / OUT: 0 mL / NET: 200 mL          	    Cardiovascular: Normal S1 S2, No JVD, 1/6 BELINDA murmur,  Respiratory: Lungs clear to auscultation, normal effort 	  Gastrointestinal:  firm mass noted on palpation  Non-tender, + BS	  Extremities no edema, cyanosis, clubbing B/L LE's    Peripheral pulses palpable 2+ bilaterally    TELEMETRY: 	    ECG:  	  RADIOLOGY:   < from: CT Abdomen and Pelvis w/ IV Cont (03.15.18 @ 06:41) >  IMPRESSION:     New large fluid and air-filled cavity within the central portionof the   uterus measuring 10.4 x 6.6 x 9.1 cm. This may represent an expanded   endometrial cavity related to an abscess although necrotic infected tumor   is also possible.    Increased heterogeneous enlargement of the lower uterine segment/cervix   likely indicates extension of the neoplasm.    Enlarging left pelvic sidewall and left internal iliac necrotic nodes.    < end of copied text >    DIAGNOSTIC TESTING:  [ ] Echocardiogram:  [ ]  Catheterization:  [ ] Stress Test:    OTHER: 	  < from: Transthoracic Echocardiogram (11.06.15 @ 15:01) >  Mitral Valve: Mitral annular calcification, otherwise  normal mitral valve. Minimal mitral regurgitation.  Aortic Root: Normal aortic root.  Aortic Valve: Calcified trileaflet aortic valve withnormal  opening.  Left Atrium: Normal left atrium.  LA volume index = 21  cc/m2.  Left Ventricle: Normal left ventricular systolic function.  No segmental wall motion abnormalities. Normal left  ventricular internal dimensions and wall thicknesses. Mild  diastolic dysfunction (Stage I).  Right Heart: Normal right atrium. Normal right ventricular  size and function. Normal tricuspid valve. Minimal  tricuspid regurgitation. Normal pulmonic valve.  Pericardium/PleuraNormal pericardium with no pericardial  effusion.    < end of copied text >      ASSESSMENT/PLAN: 	73y Female with asthma, HTN, no known h/o CAD/MI, atrial fibrillation on Eliquis, with NL LV function on TTE 12/2016, with unremarkable plain TST 3/2016,  stage 4 metastatic endometrial carcinoma (to lung, left ribs, pubic ramus- not surgical candidate s/p chemo with Carboplatin and Taxol - last dose 11/17) previously admitted for sepsis 2/2 perforated sigmoid diverticulitis now admitted with abdominal pain and vaginal bleeding. She was febrile in the ED to 101. CT a/p revealed large amount of fluid/air in uterus with necrotic tumor and progression of disease.  She was found to be markedly anemic with Hgb 6. She denies any anginal symptoms, syncope , near syncope or palpitations. 	  -- onc noted - not candidate for treatment surgically or with chemo at this time  -- palliative noted - possible hospice - awaiting patient/family decision   -- ID noted - +urine cx with GNR                    -  negative blood cx to date - on IV abx  -- presently Eliquis on hold given significant anemia  - likely 2/2 vaginal bleeding/ chronic dx/infection    - if it is within GOC , and no contraindications exist would c/w Eliquis  given her elevated CHADS-VASC Score   -- check baseline 12 lead EKG  -- clinically HR is controlled and patient is without evidence of CHF  -- f/u with Dr Becker upon dc for cardiology  -- if patient requires IR drainage of uterine fluid collection she may be considered optimized from a cardiac perspective - she is low risk for a low risk procedure per RCRI score   -- final recs pending GOC / pt decision/ palliative f/u Subjective:   	 no chest pain   no shortness of breath            o/n events note  light vaginal bleeding  and sweats      MEDICATIONS:  MEDICATIONS  (STANDING):  gabapentin 600 milliGRAM(s) Oral two times a day  influenza   Vaccine 0.5 milliLiter(s) IntraMuscular once  metoprolol     tartrate 25 milliGRAM(s) Oral two times a day  piperacillin/tazobactam IVPB. 3.375 Gram(s) IV Intermittent every 8 hours    MEDICATIONS  (PRN):  acetaminophen   Tablet. 650 milliGRAM(s) Oral every 6 hours PRN Mild Pain (1 - 3)  oxyCODONE    IR 10 milliGRAM(s) Oral every 4 hours PRN Moderate Pain (4 - 6)      LABS:	 	    CARDIAC MARKERS:                                7.1    12.53 )-----------( 331      ( 17 Mar 2018 05:35 )             23.9     03-16    140  |  99  |  11  ----------------------------<  106<H>  3.4<L>   |  25  |  0.66    Ca    8.8      16 Mar 2018 09:17  Phos  3.9     03-16  Mg     1.3     03-16      COAGS:       proBNP:   Lipid Profile:   HgA1c:   TSH:       PHYSICAL EXAM:  T(C): 36.8 (03-17-18 @ 04:56), Max: 37.3 (03-16-18 @ 17:47)  HR: 79 (03-17-18 @ 04:56) (67 - 95)  BP: 153/71 (03-17-18 @ 04:56) (124/64 - 153/71)  RR: 17 (03-17-18 @ 04:56) (16 - 18)  SpO2: 100% (03-17-18 @ 04:56) (98% - 100%)  Wt(kg): --  I&O's Summary    16 Mar 2018 07:01  -  17 Mar 2018 07:00  --------------------------------------------------------  IN: 200 mL / OUT: 0 mL / NET: 200 mL          	    Cardiovascular: Normal S1 S2, No JVD, 1/6 BELINDA murmur,  Respiratory: Lungs clear to auscultation, normal effort 	  Gastrointestinal:  firm mass noted on palpation  Non-tender, + BS	  Extremities no edema, cyanosis, clubbing B/L LE's    Peripheral pulses palpable 2+ bilaterally    TELEMETRY: 	    ECG:  	  RADIOLOGY:   < from: CT Abdomen and Pelvis w/ IV Cont (03.15.18 @ 06:41) >  IMPRESSION:     New large fluid and air-filled cavity within the central portionof the   uterus measuring 10.4 x 6.6 x 9.1 cm. This may represent an expanded   endometrial cavity related to an abscess although necrotic infected tumor   is also possible.    Increased heterogeneous enlargement of the lower uterine segment/cervix   likely indicates extension of the neoplasm.    Enlarging left pelvic sidewall and left internal iliac necrotic nodes.    < end of copied text >    DIAGNOSTIC TESTING:  [ ] Echocardiogram:  [ ]  Catheterization:  [ ] Stress Test:    OTHER: 	  < from: Transthoracic Echocardiogram (11.06.15 @ 15:01) >  Mitral Valve: Mitral annular calcification, otherwise  normal mitral valve. Minimal mitral regurgitation.  Aortic Root: Normal aortic root.  Aortic Valve: Calcified trileaflet aortic valve withnormal  opening.  Left Atrium: Normal left atrium.  LA volume index = 21  cc/m2.  Left Ventricle: Normal left ventricular systolic function.  No segmental wall motion abnormalities. Normal left  ventricular internal dimensions and wall thicknesses. Mild  diastolic dysfunction (Stage I).  Right Heart: Normal right atrium. Normal right ventricular  size and function. Normal tricuspid valve. Minimal  tricuspid regurgitation. Normal pulmonic valve.  Pericardium/PleuraNormal pericardium with no pericardial  effusion.    < end of copied text >      ASSESSMENT/PLAN: 	73y Female with asthma, HTN, no known h/o CAD/MI, atrial fibrillation on Eliquis, with NL LV function on TTE 12/2016, with unremarkable plain TST 3/2016,  stage 4 metastatic endometrial carcinoma (to lung, left ribs, pubic ramus- not surgical candidate s/p chemo with Carboplatin and Taxol - last dose 11/17) previously admitted for sepsis 2/2 perforated sigmoid diverticulitis now admitted with abdominal pain and vaginal bleeding. She was febrile in the ED to 101. CT a/p revealed large amount of fluid/air in uterus with necrotic tumor and progression of disease.  She was found to be markedly anemic with Hgb 6. She denies any anginal symptoms, syncope , near syncope or palpitations. 	    -- onc noted - not candidate for treatment surgically or with chemo at this time  -- palliative noted - possible hospice - awaiting patient/family decision   -- ID noted - +urine cx with GNR                    -  negative blood cx to date - on IV abx  -- presently Eliquis on hold given significant anemia  - likely 2/2 vaginal bleeding/ chronic dx/infection  -- f/u with Dr Becker upon dc for cardiology  -- if patient requires IR drainage of uterine fluid collection she may be considered optimized from a cardiac perspective - she is low risk for a low risk procedure per RCRI score   -- final recs pending GOC / pt decision/ palliative f/u

## 2018-03-18 LAB
HCT VFR BLD CALC: 27.5 % — LOW (ref 34.5–45)
HGB BLD-MCNC: 7.7 G/DL — LOW (ref 11.5–15.5)
MCHC RBC-ENTMCNC: 24.8 PG — LOW (ref 27–34)
MCHC RBC-ENTMCNC: 28 % — LOW (ref 32–36)
MCV RBC AUTO: 88.7 FL — SIGNIFICANT CHANGE UP (ref 80–100)
NRBC # FLD: 0 — SIGNIFICANT CHANGE UP
PLATELET # BLD AUTO: 381 K/UL — SIGNIFICANT CHANGE UP (ref 150–400)
PMV BLD: 8.9 FL — SIGNIFICANT CHANGE UP (ref 7–13)
RBC # BLD: 3.1 M/UL — LOW (ref 3.8–5.2)
RBC # FLD: 17.3 % — HIGH (ref 10.3–14.5)
WBC # BLD: 12.32 K/UL — HIGH (ref 3.8–10.5)
WBC # FLD AUTO: 12.32 K/UL — HIGH (ref 3.8–10.5)

## 2018-03-18 PROCEDURE — 99233 SBSQ HOSP IP/OBS HIGH 50: CPT | Mod: GC

## 2018-03-18 RX ORDER — ACETAMINOPHEN 500 MG
650 TABLET ORAL ONCE
Qty: 0 | Refills: 0 | Status: COMPLETED | OUTPATIENT
Start: 2018-03-18 | End: 2018-03-18

## 2018-03-18 RX ADMIN — OXYCODONE HYDROCHLORIDE 10 MILLIGRAM(S): 5 TABLET ORAL at 16:20

## 2018-03-18 RX ADMIN — PIPERACILLIN AND TAZOBACTAM 25 GRAM(S): 4; .5 INJECTION, POWDER, LYOPHILIZED, FOR SOLUTION INTRAVENOUS at 22:34

## 2018-03-18 RX ADMIN — Medication 650 MILLIGRAM(S): at 13:50

## 2018-03-18 RX ADMIN — GABAPENTIN 600 MILLIGRAM(S): 400 CAPSULE ORAL at 05:48

## 2018-03-18 RX ADMIN — Medication 25 MILLIGRAM(S): at 17:34

## 2018-03-18 RX ADMIN — Medication 650 MILLIGRAM(S): at 12:51

## 2018-03-18 RX ADMIN — Medication 650 MILLIGRAM(S): at 20:51

## 2018-03-18 RX ADMIN — GABAPENTIN 600 MILLIGRAM(S): 400 CAPSULE ORAL at 17:34

## 2018-03-18 RX ADMIN — PIPERACILLIN AND TAZOBACTAM 25 GRAM(S): 4; .5 INJECTION, POWDER, LYOPHILIZED, FOR SOLUTION INTRAVENOUS at 14:25

## 2018-03-18 RX ADMIN — Medication 650 MILLIGRAM(S): at 04:07

## 2018-03-18 RX ADMIN — OXYCODONE HYDROCHLORIDE 10 MILLIGRAM(S): 5 TABLET ORAL at 15:30

## 2018-03-18 RX ADMIN — PIPERACILLIN AND TAZOBACTAM 25 GRAM(S): 4; .5 INJECTION, POWDER, LYOPHILIZED, FOR SOLUTION INTRAVENOUS at 05:48

## 2018-03-18 RX ADMIN — Medication 650 MILLIGRAM(S): at 03:44

## 2018-03-18 RX ADMIN — Medication 650 MILLIGRAM(S): at 20:04

## 2018-03-18 NOTE — PROGRESS NOTE ADULT - PROBLEM SELECTOR PLAN 2
- patient was afebrile overnight but has leukocytosis this morning  - source is likely necrotic mass in the uterus --> on zosyn day 3  - ID on board, recommended IR guided biopsy and drainage of abscess but IR will not intervene, recommends gyn onc or MRI to further characterize mass - patient was afebrile overnight but has leukocytosis this morning  - source is likely necrotic mass in the uterus --> on zosyn day 4  - ID on board, recommended IR guided biopsy and drainage of abscess but IR will not intervene, recommends gyn onc or MRI to further characterize mass

## 2018-03-18 NOTE — PROGRESS NOTE ADULT - SUBJECTIVE AND OBJECTIVE BOX
SUBJECTIVE: No CP or SOB, reports no vaginal bleeding overnight      MEDICATIONS  (STANDING):  gabapentin 600 milliGRAM(s) Oral two times a day  influenza   Vaccine 0.5 milliLiter(s) IntraMuscular once  metoprolol     tartrate 25 milliGRAM(s) Oral two times a day  piperacillin/tazobactam IVPB. 3.375 Gram(s) IV Intermittent every 8 hours    MEDICATIONS  (PRN):  acetaminophen   Tablet. 650 milliGRAM(s) Oral every 6 hours PRN Mild Pain (1 - 3)  oxyCODONE    IR 10 milliGRAM(s) Oral every 4 hours PRN Moderate Pain (4 - 6)      LABS:                        7.7    12.32 )-----------( 381      ( 18 Mar 2018 06:48 )             27.5     PHYSICAL EXAM:  Vital Signs Last 24 Hrs  T(C): 36.6 (18 Mar 2018 05:28), Max: 36.8 (17 Mar 2018 12:30)  T(F): 97.9 (18 Mar 2018 05:28), Max: 98.3 (17 Mar 2018 12:30)  HR: 59 (18 Mar 2018 05:28) (59 - 88)  BP: 131/64 (18 Mar 2018 05:28) (110/53 - 132/62)  RR: 17 (18 Mar 2018 05:28) (16 - 17)  SpO2: 100% (18 Mar 2018 05:28) (98% - 100%)    Cardiovascular:  S1S2 RRR, No JVD  Respiratory: Lungs clear to auscultation, normal effort  Gastrointestinal: Abdomen soft, ND, NT, +BS  Skin: Warm, dry, intact. No rash.  Musculoskeletal: Normal ROM, normal strength  Ext: No C/C/E B/L LE    DIAGNOSTIC DATA    RADIOLOGY:    < from: CT Abdomen and Pelvis w/ IV Cont (03.15.18 @ 06:41) >  IMPRESSION:     New large fluid and air-filled cavity within the central portionof the   uterus measuring 10.4 x 6.6 x 9.1 cm. This may represent an expanded   endometrial cavity related to an abscess although necrotic infected tumor   is also possible.    Increased heterogeneous enlargement of the lower uterine segment/cervix   likely indicates extension of the neoplasm.    Enlarging left pelvic sidewall and left internal iliac necrotic nodes.    < end of copied text >          ASSESSMENT AND PLAN:  73y Female with asthma, HTN, no known h/o CAD/MI, atrial fibrillation on Eliquis, with NL LV function on TTE 12/2016, with unremarkable plain TST 3/2016,  stage 4 metastatic endometrial carcinoma (to lung, left ribs, pubic ramus- not surgical candidate s/p chemo with Carboplatin and Taxol - last dose 11/17) previously admitted for sepsis 2/2 perforated sigmoid diverticulitis now admitted with abdominal pain and vaginal bleeding. She was febrile in the ED to 101. CT a/p revealed large amount of fluid/air in uterus with necrotic tumor and progression of disease.  She was found to be markedly anemic with Hgb 6.    -- onc eval noted - not candidate for treatment surgically or with chemo at this time  -- palliative eval noted - possible hospice - awaiting patient/family decision   -- ID noted - +urine cx with GNR, negative blood cx to date - on IV abx  -- presently Eliquis on hold given significant anemia/ vaginal bleeding  -- f/u with Dr Becker upon dc for cardiology  -- if patient requires IR drainage of uterine fluid collection she may be considered optimized from a cardiac perspective - she is low risk for a low risk procedure per RCRI score   -- final recs pending GOC / pt decision/ palliative f/u     Lorena Bianchi PA-C  [facility]  [address]   [place] [address]  office [phone]  pager [phone]

## 2018-03-18 NOTE — PROGRESS NOTE ADULT - SUBJECTIVE AND OBJECTIVE BOX
Karel Chaparro MD  PGY-3 Internal Medicine Resident  [facility] Contact: [phone]  [facility] Contact: Pager #[phone]    Patient is a 73y old  Female who presents with a chief complaint of Abdominal pain (15 Mar 2018 13:59)    Subjective: Today has no pain. Vaginal bleeding stopped. No sob/cp/f/c/n/v/d    Vital Signs Last 24 Hrs  T(C): 36.6 (18 Mar 2018 05:28), Max: 36.8 (17 Mar 2018 12:30)  T(F): 97.9 (18 Mar 2018 05:28), Max: 98.3 (17 Mar 2018 12:30)  HR: 59 (18 Mar 2018 05:28) (59 - 88)  BP: 131/64 (18 Mar 2018 05:28) (110/53 - 132/62)  RR: 17 (18 Mar 2018 05:28) (16 - 17)  SpO2: 100% (18 Mar 2018 05:28) (98% - 100%)    PHYSICAL EXAM:   GENERAL: no acute distress, thin appearing woman  HEENT: PERRLA, EOMI, moist oropharynx   RESPIRATORY: CTAB, no wheezes or crackles   CARDIOVASCULAR: regular rate and rhythm on cardiac exam, no murmurs appreciated   ABDOMINAL: +BS, abdomen not as firm as before but firm mass still appreciated at the level of the umbilicus   EXTREMITIES: no peripheral edema appreciated   NEUROLOGICAL: alert and oriented x 3, no focal deficits   SKIN: no rashes or lesions   MUSCULOSKELETAL: no gross joint deformity    LABS:                        7.7    12.32 )-----------( 381      ( 18 Mar 2018 06:48 )             27.5     Auto Eosinophil # x     / Auto Eosinophil % x     / Auto Neutrophil # x     / Auto Neutrophil % x     / BANDS % x                            7.1    12.53 )-----------( 331      ( 17 Mar 2018 05:35 )             23.9     Auto Eosinophil # x     / Auto Eosinophil % x     / Auto Neutrophil # x     / Auto Neutrophil % x     / BANDS % x                            7.5    9.78  )-----------( 320      ( 16 Mar 2018 18:48 )             25.5     Auto Eosinophil # x     / Auto Eosinophil % x     / Auto Neutrophil # x     / Auto Neutrophil % x     / BANDS % x        MEDICATIONS  (STANDING):  gabapentin 600 milliGRAM(s) Oral two times a day  influenza   Vaccine 0.5 milliLiter(s) IntraMuscular once  metoprolol     tartrate 25 milliGRAM(s) Oral two times a day  piperacillin/tazobactam IVPB. 3.375 Gram(s) IV Intermittent every 8 hours    MEDICATIONS  (PRN):  acetaminophen   Tablet. 650 milliGRAM(s) Oral every 6 hours PRN Mild Pain (1 - 3)  oxyCODONE    IR 10 milliGRAM(s) Oral every 4 hours PRN Moderate Pain (4 - 6)

## 2018-03-19 LAB
BLD GP AB SCN SERPL QL: NEGATIVE — SIGNIFICANT CHANGE UP
HCT VFR BLD CALC: 23.9 % — LOW (ref 34.5–45)
HCT VFR BLD CALC: 24.8 % — LOW (ref 34.5–45)
HGB BLD-MCNC: 7 G/DL — CRITICAL LOW (ref 11.5–15.5)
HGB BLD-MCNC: 7.1 G/DL — LOW (ref 11.5–15.5)
MCHC RBC-ENTMCNC: 25.1 PG — LOW (ref 27–34)
MCHC RBC-ENTMCNC: 26.2 PG — LOW (ref 27–34)
MCHC RBC-ENTMCNC: 28.6 % — LOW (ref 32–36)
MCHC RBC-ENTMCNC: 29.3 % — LOW (ref 32–36)
MCV RBC AUTO: 87.6 FL — SIGNIFICANT CHANGE UP (ref 80–100)
MCV RBC AUTO: 89.5 FL — SIGNIFICANT CHANGE UP (ref 80–100)
NRBC # FLD: 0 — SIGNIFICANT CHANGE UP
NRBC # FLD: 0.02 — SIGNIFICANT CHANGE UP
PLATELET # BLD AUTO: 353 K/UL — SIGNIFICANT CHANGE UP (ref 150–400)
PLATELET # BLD AUTO: 366 K/UL — SIGNIFICANT CHANGE UP (ref 150–400)
PMV BLD: 8.6 FL — SIGNIFICANT CHANGE UP (ref 7–13)
PMV BLD: 8.8 FL — SIGNIFICANT CHANGE UP (ref 7–13)
RBC # BLD: 2.67 M/UL — LOW (ref 3.8–5.2)
RBC # BLD: 2.83 M/UL — LOW (ref 3.8–5.2)
RBC # FLD: 17.4 % — HIGH (ref 10.3–14.5)
RBC # FLD: 17.5 % — HIGH (ref 10.3–14.5)
RH IG SCN BLD-IMP: POSITIVE — SIGNIFICANT CHANGE UP
WBC # BLD: 12.78 K/UL — HIGH (ref 3.8–10.5)
WBC # BLD: 13.6 K/UL — HIGH (ref 3.8–10.5)
WBC # FLD AUTO: 12.78 K/UL — HIGH (ref 3.8–10.5)
WBC # FLD AUTO: 13.6 K/UL — HIGH (ref 3.8–10.5)

## 2018-03-19 PROCEDURE — 99233 SBSQ HOSP IP/OBS HIGH 50: CPT

## 2018-03-19 PROCEDURE — 99232 SBSQ HOSP IP/OBS MODERATE 35: CPT

## 2018-03-19 PROCEDURE — 99233 SBSQ HOSP IP/OBS HIGH 50: CPT | Mod: GC

## 2018-03-19 RX ORDER — ACETAMINOPHEN 500 MG
650 TABLET ORAL ONCE
Qty: 0 | Refills: 0 | Status: COMPLETED | OUTPATIENT
Start: 2018-03-19 | End: 2018-03-19

## 2018-03-19 RX ADMIN — GABAPENTIN 600 MILLIGRAM(S): 400 CAPSULE ORAL at 18:17

## 2018-03-19 RX ADMIN — OXYCODONE HYDROCHLORIDE 10 MILLIGRAM(S): 5 TABLET ORAL at 22:48

## 2018-03-19 RX ADMIN — Medication 650 MILLIGRAM(S): at 07:20

## 2018-03-19 RX ADMIN — GABAPENTIN 600 MILLIGRAM(S): 400 CAPSULE ORAL at 05:10

## 2018-03-19 RX ADMIN — Medication 650 MILLIGRAM(S): at 21:53

## 2018-03-19 RX ADMIN — Medication 650 MILLIGRAM(S): at 22:48

## 2018-03-19 RX ADMIN — Medication 650 MILLIGRAM(S): at 15:00

## 2018-03-19 RX ADMIN — OXYCODONE HYDROCHLORIDE 10 MILLIGRAM(S): 5 TABLET ORAL at 02:49

## 2018-03-19 RX ADMIN — Medication 650 MILLIGRAM(S): at 08:19

## 2018-03-19 RX ADMIN — Medication 25 MILLIGRAM(S): at 05:10

## 2018-03-19 RX ADMIN — PIPERACILLIN AND TAZOBACTAM 25 GRAM(S): 4; .5 INJECTION, POWDER, LYOPHILIZED, FOR SOLUTION INTRAVENOUS at 14:00

## 2018-03-19 RX ADMIN — OXYCODONE HYDROCHLORIDE 10 MILLIGRAM(S): 5 TABLET ORAL at 21:53

## 2018-03-19 RX ADMIN — PIPERACILLIN AND TAZOBACTAM 25 GRAM(S): 4; .5 INJECTION, POWDER, LYOPHILIZED, FOR SOLUTION INTRAVENOUS at 05:10

## 2018-03-19 RX ADMIN — PIPERACILLIN AND TAZOBACTAM 25 GRAM(S): 4; .5 INJECTION, POWDER, LYOPHILIZED, FOR SOLUTION INTRAVENOUS at 21:52

## 2018-03-19 RX ADMIN — OXYCODONE HYDROCHLORIDE 10 MILLIGRAM(S): 5 TABLET ORAL at 01:59

## 2018-03-19 RX ADMIN — Medication 650 MILLIGRAM(S): at 14:00

## 2018-03-19 NOTE — PROGRESS NOTE ADULT - SUBJECTIVE AND OBJECTIVE BOX
CC: Patient is a 73y old  Female who presents with a chief complaint of Abdominal pain (17 Mar 2018 18:01)    Interval History/ROS: Patient remains with lower abdominal pain. Denies fever, chills.    Allergies  No Known Allergies    ANTIMICROBIALS:  piperacillin/tazobactam IVPB. 3.375 every 8 hours    PE:    Vital Signs Last 24 Hrs  T(C): 37.1 (19 Mar 2018 18:15), Max: 37.6 (19 Mar 2018 04:27)  T(F): 98.7 (19 Mar 2018 18:15), Max: 99.6 (19 Mar 2018 04:27)  HR: 57 (19 Mar 2018 18:15) (52 - 62)  BP: 130/63 (19 Mar 2018 18:15) (121/59 - 131/60)  BP(mean): --  RR: 17 (19 Mar 2018 18:15) (16 - 17)  SpO2: 97% (19 Mar 2018 18:15) (97% - 100%)    Gen: AOx3, NAD, non-toxic, pleasant  CV: S1+S2 normal, no murmurs  Resp: Clear bilat, no resp distress  Abd: Soft, nontender, +BS  Ext: No LE edema, no wounds  : No Feng  IV/Skin: No thrombophlebitis  Msk: No low back pain, no arthralgias, no joint swelling  Neuro: No sensory deficits, no motor deficits    LABS:                          7.1    13.60 )-----------( 366      ( 19 Mar 2018 18:06 )             24.8     MICROBIOLOGY:  v  BLOOD PERIPHERAL  03-15-18 --  --  --      URINE MIDSTREAM  03-15-18 --  --  --    RADIOLOGY:    No new images.

## 2018-03-19 NOTE — PROGRESS NOTE ADULT - SUBJECTIVE AND OBJECTIVE BOX
Patient is a 73y old  Female who presents with a chief complaint of Abdominal pain (17 Mar 2018 18:01)      SUBJECTIVE / OVERNIGHT EVENTS:    MEDICATIONS  (STANDING):  gabapentin 600 milliGRAM(s) Oral two times a day  influenza   Vaccine 0.5 milliLiter(s) IntraMuscular once  metoprolol     tartrate 25 milliGRAM(s) Oral two times a day  piperacillin/tazobactam IVPB. 3.375 Gram(s) IV Intermittent every 8 hours    MEDICATIONS  (PRN):  acetaminophen   Tablet. 650 milliGRAM(s) Oral every 6 hours PRN Mild Pain (1 - 3)  oxyCODONE    IR 10 milliGRAM(s) Oral every 4 hours PRN Moderate Pain (4 - 6)      OBJECTIVE:    Vital Signs Last 24 Hrs  T(C): 37.6 (19 Mar 2018 04:27), Max: 37.6 (19 Mar 2018 04:27)  T(F): 99.6 (19 Mar 2018 04:27), Max: 99.6 (19 Mar 2018 04:27)  HR: 62 (19 Mar 2018 04:27) (52 - 67)  BP: 131/60 (19 Mar 2018 04:27) (122/50 - 131/60)  BP(mean): --  RR: 17 (19 Mar 2018 04:27) (17 - 17)  SpO2: 100% (19 Mar 2018 04:27) (98% - 100%)    CAPILLARY BLOOD GLUCOSE        I&O's Summary      PHYSICAL EXAM:  GENERAL: NAD, well-developed  HEAD:  Atraumatic, Normocephalic  EYES: EOMI, PERRLA, conjunctiva and sclera clear  NECK: Supple, No JVD  CHEST/LUNG: Clear to auscultation bilaterally; No wheeze  HEART: Regular rate and rhythm; No murmurs, rubs, or gallops  ABDOMEN: Soft, Nontender, Nondistended; Bowel sounds present  EXTREMITIES:  2+ Peripheral Pulses, No clubbing, cyanosis, or edema  PSYCH: AAOx3  NEUROLOGY: non-focal  SKIN: No rashes or lesions    LABS:                        7.7    12.32 )-----------( 381      ( 18 Mar 2018 06:48 )             27.5     Auto Eosinophil # x     / Auto Eosinophil % x     / Auto Neutrophil # x     / Auto Neutrophil % x     / BANDS % x                    Lactate, Blood: 1.1 mmol/L (03-16 @ 09:17)      RESPIRATORY  VENT:    ABG:     VBG:     RADIOLOGY & ADDITIONAL TESTS:  (Imaging Personally Reviewed)    Consultant(s) Notes Reviewed:      Care Discussed with Consultants/Other Providers: Patient is a 73y old  Female who presents with a chief complaint of Abdominal pain (17 Mar 2018 18:01)      SUBJECTIVE / OVERNIGHT EVENTS:  Overnight, patient dropped saturation to 88-90%. Placed on nasal cannula at 2L with improved saturation.     MEDICATIONS  (STANDING):  gabapentin 600 milliGRAM(s) Oral two times a day  influenza   Vaccine 0.5 milliLiter(s) IntraMuscular once  metoprolol     tartrate 25 milliGRAM(s) Oral two times a day  piperacillin/tazobactam IVPB. 3.375 Gram(s) IV Intermittent every 8 hours    MEDICATIONS  (PRN):  acetaminophen   Tablet. 650 milliGRAM(s) Oral every 6 hours PRN Mild Pain (1 - 3)  oxyCODONE    IR 10 milliGRAM(s) Oral every 4 hours PRN Moderate Pain (4 - 6)      OBJECTIVE:    Vital Signs Last 24 Hrs  T(C): 37.6 (19 Mar 2018 04:27), Max: 37.6 (19 Mar 2018 04:27)  T(F): 99.6 (19 Mar 2018 04:27), Max: 99.6 (19 Mar 2018 04:27)  HR: 62 (19 Mar 2018 04:27) (52 - 67)  BP: 131/60 (19 Mar 2018 04:27) (122/50 - 131/60)  BP(mean): --  RR: 17 (19 Mar 2018 04:27) (17 - 17)  SpO2: 100% (19 Mar 2018 04:27) (98% - 100%)    CAPILLARY BLOOD GLUCOSE        I&O's Summary      PHYSICAL EXAM:  GENERAL: NAD, well-developed  HEAD:  Atraumatic, Normocephalic  EYES: EOMI, PERRLA, conjunctiva and sclera clear  NECK: Supple, No JVD  CHEST/LUNG: Clear to auscultation bilaterally; No wheeze  HEART: Regular rate and rhythm; No murmurs, rubs, or gallops  ABDOMEN: Soft, Nontender, Nondistended; Bowel sounds present  EXTREMITIES:  2+ Peripheral Pulses, No clubbing, cyanosis, or edema  PSYCH: AAOx3  NEUROLOGY: non-focal  SKIN: No rashes or lesions    LABS:                        7.7    12.32 )-----------( 381      ( 18 Mar 2018 06:48 )             27.5     Auto Eosinophil # x     / Auto Eosinophil % x     / Auto Neutrophil # x     / Auto Neutrophil % x     / BANDS % x                    Lactate, Blood: 1.1 mmol/L (03-16 @ 09:17) Patient is a 73y old  Female who presents with a chief complaint of Abdominal pain (17 Mar 2018 18:01)      SUBJECTIVE / OVERNIGHT EVENTS:  Patient seen and examined at bedside. No acute complaints overnight. States she had wonderful sleep and her pain is very well controlled.    MEDICATIONS  (STANDING):  gabapentin 600 milliGRAM(s) Oral two times a day  influenza   Vaccine 0.5 milliLiter(s) IntraMuscular once  metoprolol     tartrate 25 milliGRAM(s) Oral two times a day  piperacillin/tazobactam IVPB. 3.375 Gram(s) IV Intermittent every 8 hours    MEDICATIONS  (PRN):  acetaminophen   Tablet. 650 milliGRAM(s) Oral every 6 hours PRN Mild Pain (1 - 3)  oxyCODONE    IR 10 milliGRAM(s) Oral every 4 hours PRN Moderate Pain (4 - 6)      OBJECTIVE:    Vital Signs Last 24 Hrs  T(C): 37.6 (19 Mar 2018 04:27), Max: 37.6 (19 Mar 2018 04:27)  T(F): 99.6 (19 Mar 2018 04:27), Max: 99.6 (19 Mar 2018 04:27)  HR: 62 (19 Mar 2018 04:27) (52 - 67)  BP: 131/60 (19 Mar 2018 04:27) (122/50 - 131/60)  BP(mean): --  RR: 17 (19 Mar 2018 04:27) (17 - 17)  SpO2: 100% (19 Mar 2018 04:27) (98% - 100%)    CAPILLARY BLOOD GLUCOSE        I&O's Summary      PHYSICAL EXAM:  GENERAL: NAD, well-developed  EYES: EOMI, PERRLA, conjunctiva and sclera clear  NECK: Supple, no lymphadenopathy  CHEST/LUNG: Clear to auscultation bilaterally; No wheeze  HEART: Regular rate and rhythm; No murmurs, rubs, or gallops  ABDOMEN: Soft, Nontender, Nondistended; Bowel sounds present  EXTREMITIES:  2+ Peripheral Pulses, No clubbing, cyanosis, or edema  PSYCH: AAOx3  NEUROLOGY: non-focal      LABS:                        7.7    12.32 )-----------( 381      ( 18 Mar 2018 06:48 )             27.5     Auto Eosinophil # x     / Auto Eosinophil % x     / Auto Neutrophil # x     / Auto Neutrophil % x     / BANDS % x                    Lactate, Blood: 1.1 mmol/L (03-16 @ 09:17) Patient is a 73y old  Female who presents with a chief complaint of Abdominal pain (17 Mar 2018 18:01)      SUBJECTIVE / OVERNIGHT EVENTS:  Patient seen and examined at bedside. No acute complaints overnight. States she had wonderful sleep and her pain is very well controlled.    MEDICATIONS  (STANDING):  gabapentin 600 milliGRAM(s) Oral two times a day  influenza   Vaccine 0.5 milliLiter(s) IntraMuscular once  metoprolol     tartrate 25 milliGRAM(s) Oral two times a day  piperacillin/tazobactam IVPB. 3.375 Gram(s) IV Intermittent every 8 hours    MEDICATIONS  (PRN):  acetaminophen   Tablet. 650 milliGRAM(s) Oral every 6 hours PRN Mild Pain (1 - 3)  oxyCODONE    IR 10 milliGRAM(s) Oral every 4 hours PRN Moderate Pain (4 - 6)      OBJECTIVE:    Vital Signs Last 24 Hrs  T(C): 37.6 (19 Mar 2018 04:27), Max: 37.6 (19 Mar 2018 04:27)  T(F): 99.6 (19 Mar 2018 04:27), Max: 99.6 (19 Mar 2018 04:27)  HR: 62 (19 Mar 2018 04:27) (52 - 67)  BP: 131/60 (19 Mar 2018 04:27) (122/50 - 131/60)  BP(mean): --  RR: 17 (19 Mar 2018 04:27) (17 - 17)  SpO2: 100% (19 Mar 2018 04:27) (98% - 100%)    CAPILLARY BLOOD GLUCOSE        I&O's Summary      PHYSICAL EXAM:  GENERAL: NAD, well-developed  EYES: EOMI, PERRLA, conjunctiva and sclera clear  NECK: Supple, no lymphadenopathy  CHEST/LUNG: Clear to auscultation bilaterally; No wheeze  HEART: Regular rate and rhythm; No murmurs, rubs, or gallops  ABDOMEN: Soft, Nontender, Nondistended; Bowel sounds present  EXTREMITIES:  2+ Peripheral Pulses, No clubbing, cyanosis, or edema  PSYCH: AAOx3  NEUROLOGY: non-focal      LABS:                        7.7    12.32 )-----------( 381      ( 18 Mar 2018 06:48 )             27.5     Auto Eosinophil # x     / Auto Eosinophil % x     / Auto Neutrophil # x     / Auto Neutrophil % x     / BANDS % x          Lactate, Blood: 1.1 mmol/L (03-16 @ 09:17)    CONSULTANT NOTES REVIEWED: Cardiology    CASE DISCUSSED WITH: Dr. Choi (ID): ?MRI to further characterize lesion in uterus but radiology now saying would not help

## 2018-03-19 NOTE — PROGRESS NOTE ADULT - PROBLEM SELECTOR PLAN 1
- likely secondary to endometrial mass with bladder invasion causing hematuria which has now resolved  - CBC holding at 7.1, active type and screen and signed blood consent is in the chart  - patient continues to be asymptomatic but will continue to hold apixaban - likely secondary to endometrial mass with bladder invasion causing hematuria which has now resolved as well as  bleed  - CBC holding at 7.0, active type and screen and signed blood consent is in the chart  - patient continues to be asymptomatic but will continue to hold apixaban  - will re-check Hgb in afternoon - likely secondary to endometrial mass with bladder invasion causing hematuria which has now resolved as well as  bleed  - CBC holding at 7.0, active type and screen and signed blood consent is in the chart  - patient continues to be asymptomatic but will continue to hold apixaban  - will re-check Hgb in afternoon to determine if blood transfusion needed

## 2018-03-19 NOTE — PROGRESS NOTE ADULT - PROBLEM SELECTOR PLAN 1
Patient with metastatic disease, as per oncology there is no cancer-directed tx at this time. Patient is hospice appropriate

## 2018-03-19 NOTE — PROVIDER CONTACT NOTE (CRITICAL VALUE NOTIFICATION) - BACKGROUND
Pt is a 74 y/o femaled, admitted for 3 days of abdominal pain. Pt has hx of stage IV endometrial CA, Afib, HTN.

## 2018-03-19 NOTE — PROGRESS NOTE ADULT - ASSESSMENT
73 year old female with Stage IVB Endometrial CA (s/p 7 cycles of carboplatin/taxol last chemo in November 2017), Atrial fibrillation on apixaban, and HTN who presented to the ED with 3 days of abdominal pain.     Diagnosed with metastatic endometrial cancer in April 2017 with mets to the lungs and lytic lesions in the left 6th rib along with the R superior pubic ramus.     Recently admitted 11/29-12/8 for perforated sigmoid diverticulitis with IR guided drainage    Febrile to 101F in the ED, tachycardic to 100bpm, leukocytosis to 13K, elevated lactate 5.6. CT with new large fluid and air-filled cavity in the uterus measuring 10.4 x 6.6x 9.1 cm. Differential includes sepsis from uterine abscess vs necrotic tumor vs other etiology.    Recommend:  -Continue zosyn.  -Unclear if uterine abscess vs necrotic tumor - check MRI if within goals of care.    Jesús Choi MD  Pager [phone]  After 5pm/weekends call [phone]

## 2018-03-19 NOTE — PROGRESS NOTE ADULT - PROBLEM SELECTOR PLAN 6
- holding chemical anticoagulation given anemia   - regular diet  - Dispo pending GOC, resolution of symptoms - holding chemical anticoagulation given anemia   - regular diet  - Dispo pending GOC- hospice referral made  - will talk with Saloni regarding code status

## 2018-03-19 NOTE — PROGRESS NOTE ADULT - PROBLEM SELECTOR PLAN 2
- patient was afebrile overnight but has leukocytosis this morning  - source is likely necrotic mass in the uterus --> on zosyn day 4  - ID on board, recommended IR guided biopsy and drainage of abscess but IR will not intervene, recommends gyn onc or MRI to further characterize mass - afepatient was afebrile overnight but has leukocytosis this morning  - source is likely necrotic mass in the uterus --> on zosyn day 5  - ID on board, recommended IR guided biopsy and drainage of abscess but IR will not intervene, recommends MRI to evaluate further and characterize mass/ abscess to determine abx course - patient was afebrile overnight but has leukocytosis this morning  - source is likely necrotic mass in the uterus --> on zosyn day 5  - ID on board, recommended IR guided biopsy and drainage of abscess but IR will not intervene, recommends MRI to evaluate further and characterize mass/ abscess to determine abx course

## 2018-03-19 NOTE — PROGRESS NOTE ADULT - SUBJECTIVE AND OBJECTIVE BOX
SUBJECTIVE: No CP or SOB, reports no vaginal bleeding overnight    MEDICATIONS  (STANDING):  gabapentin 600 milliGRAM(s) Oral two times a day  influenza   Vaccine 0.5 milliLiter(s) IntraMuscular once  metoprolol     tartrate 25 milliGRAM(s) Oral two times a day  piperacillin/tazobactam IVPB. 3.375 Gram(s) IV Intermittent every 8 hours    LABS:                        7.0    12.78 )-----------( 353      ( 19 Mar 2018 05:38 )             23.9     PHYSICAL EXAM  Vital Signs Last 24 Hrs  T(C): 37.6 (19 Mar 2018 04:27), Max: 37.6 (19 Mar 2018 04:27)  T(F): 99.6 (19 Mar 2018 04:27), Max: 99.6 (19 Mar 2018 04:27)  HR: 62 (19 Mar 2018 04:27) (52 - 64)  BP: 131/60 (19 Mar 2018 04:27) (122/50 - 131/60)  RR: 17 (19 Mar 2018 04:27) (17 - 17)  SpO2: 100% (19 Mar 2018 04:27) (98% - 100%)      Cardiovascular:  S1S2 RRR, No JVD  Respiratory: Lungs clear to auscultation, normal effort  Gastrointestinal: Abdomen soft, ND, NT, +BS  Skin: Warm, dry, intact. No rash.  Musculoskeletal: Normal ROM, normal strength  Ext: No C/C/E B/L LE    DIAGNOSTIC DATA    RADIOLOGY:    < from: CT Abdomen and Pelvis w/ IV Cont (03.15.18 @ 06:41) >  IMPRESSION:     New large fluid and air-filled cavity within the central portionof the   uterus measuring 10.4 x 6.6 x 9.1 cm. This may represent an expanded   endometrial cavity related to an abscess although necrotic infected tumor   is also possible.    Increased heterogeneous enlargement of the lower uterine segment/cervix   likely indicates extension of the neoplasm.    Enlarging left pelvic sidewall and left internal iliac necrotic nodes.  < end of copied text >      ASSESSMENT AND PLAN:  73y Female with asthma, HTN, no known h/o CAD/MI, atrial fibrillation on Eliquis, with NL LV function on TTE 12/2016, with unremarkable plain TST 3/2016,  stage 4 metastatic endometrial carcinoma (to lung, left ribs, pubic ramus- not surgical candidate s/p chemo with Carboplatin and Taxol - last dose 11/17) previously admitted for sepsis 2/2 perforated sigmoid diverticulitis now admitted with abdominal pain and vaginal bleeding. She was febrile in the ED to 101. CT a/p revealed large amount of fluid/air in uterus with necrotic tumor and progression of disease.  She was found to be markedly anemic with Hgb 6.    -- onc eval noted - not candidate for treatment surgically or with chemo at this time  -- ID noted - +urine cx with GNR, negative blood cx to date - on IV abx  -- presently Eliquis on hold given significant anemia/ vaginal bleeding  -- Pt agreeable to Hospice referral  -- f/u with Dr Becker upon dc for cardiology       Lorena Bianchi PA-C  [facility]  [address], [address]   [place] [address]  office [phone]  pager [phone]

## 2018-03-19 NOTE — PROGRESS NOTE ADULT - ASSESSMENT
73 year-old-woman with stage IV metastatic endometrial cancer, pain, constipation and ebncoutner for palliative care.

## 2018-03-19 NOTE — PROGRESS NOTE ADULT - ASSESSMENT
73F w/ PMhx of significant for Stage IVB Endometrial CA (s/p 7 cycles of carboplatin/taxol last chemo in November 2017), Atrial fibrillation on apixaban, and HTN who presents to the ED with 3 days of abdominal pain, found to have worsening metastasis and hematuria likely secondary to necrotic pelvic abscess which cannot be intervened on, with likely placement to home with [facility] hospice. 73F w/ PMhx of significant for Stage IVB Endometrial CA (s/p 7 cycles of carboplatin/taxol last chemo in November 2017), Atrial fibrillation on apixaban, and HTN who presents to the ED with 3 days of abdominal pain, found to have worsening metastasis and hematuria likely secondary to necrotic pelvic abscess which cannot be intervened on, with likely placement to home with home hospice.

## 2018-03-19 NOTE — PROGRESS NOTE ADULT - PROBLEM SELECTOR PLAN 4
After previous discussion with patient. Patient amenable to hospice referral. Hospice referral made.

## 2018-03-19 NOTE — PROGRESS NOTE ADULT - PROBLEM SELECTOR PLAN 3
- patient has stage 4 endometrial cancer, seen by gynecology oncology and is not a surgical candidate  - heme onc and palliative care on board and appreciate their recs   - HCP is granddaughter, patient has not made decisions about code status yet and would like to discuss with her granddaughter first   - pain control with oxycodone and tylenol, gabapentin for neuropathy (2) assistive person

## 2018-03-19 NOTE — PROGRESS NOTE ADULT - SUBJECTIVE AND OBJECTIVE BOX
INTERVAL HPI/OVERNIGHT EVENTS:  Patient resting comfortably in bed in no acute distress.     Allergies    No Known Allergies    Intolerances        Code Status:      Full code     PRESENT SYMPTOMS:   SOURCE:  [ x] Patient   [ ] Family   [ ] Team     Pain: [ x] YES [ ] NO  Onset:    chronic                Location:   abdomen; feet                     Duration:    chronic             Character:     aching/sharp; tingling       Aggravating factors: unknown                       Relieving factors: Oxycodone/Gabapentin    Radiation:    none         Timing:        intermittent                     Severity:  intermittently severe    Dyspnea: [ ] YES [x ] NO - Mild [ ]  Moderate [ ]  Severe [ ]    Anxiety: [ ] YES [x ] NO  Fatigue: [ ] YES [ x] NO   Nausea: [ ] YES [ x] NO  Loss of appetite: [x ] YES [ ] NO   Constipation: [ ] YES [x ] NO     OTHER SYMPTOMS:  [x ] All other ROS negative     [ ] Unable to obtain due to poor mentation    Does the patient meet criteria for Severe Protein Calorie Malnutrition?  Yes [ ]  No [ ]   PPSV less than <30% [ ]  Anasarca [ ]  Albumin <2 [ ]  Catabolic State [ ]  Poor nutritional intake [ ]  Significant weight loss [ ]     MEDICATIONS  (STANDING):  gabapentin 600 milliGRAM(s) Oral two times a day  influenza   Vaccine 0.5 milliLiter(s) IntraMuscular once  metoprolol     tartrate 25 milliGRAM(s) Oral two times a day  piperacillin/tazobactam IVPB. 3.375 Gram(s) IV Intermittent every 8 hours    MEDICATIONS  (PRN):  acetaminophen   Tablet. 650 milliGRAM(s) Oral every 6 hours PRN Mild Pain (1 - 3)  oxyCODONE    IR 10 milliGRAM(s) Oral every 4 hours PRN Moderate Pain (4 - 6)      Palliative Performance Status Version 2:        70 %  ECOG - 2       Physical Exam:    General: [ x] Alert,  A&O x 3    [ ] lethargic   [ ] Agitated   [ ] Cachexia   HEENT: [ x] Normal   [ ] Dry mouth   [ ] ET Tube    [ ] Trach   Lungs: [x ] Clear [ ] Rhonchi  [ ] Crackles [ ] Wheezing [ ] Tachypnea  [ ] Audible excessive secretions    Cardiovascular:  [ x] Regular rate and rhythm  [ ] Irregular [ ] Tachycardia   [ ] Bradycardia   Abdomen: [ x] Soft  [ ] Distended  [x ] +BS  [ x] Non tender [ ] Tender  [ ]PEG   [ ] NGT   Last BM: 3/18/18    Genitourinary: [x ] Normal [ ] Incontinent   [ ] Oliguria/Anuria   [ ] Feng  Musculoskeletal:  [x ] Normal   [ ] Generalized weakness  [ ] Bedbound  [ ] Edema   Neurological: [x ] No focal deficits  [ ] Cognitive impairment     Skin: [x ] Normal   [ ] Pressure ulcers     Vital Signs Last 24 Hrs  T(C): 37.6 (19 Mar 2018 04:27), Max: 37.6 (19 Mar 2018 04:27)  T(F): 99.6 (19 Mar 2018 04:27), Max: 99.6 (19 Mar 2018 04:27)  HR: 62 (19 Mar 2018 04:27) (52 - 67)  BP: 131/60 (19 Mar 2018 04:27) (122/50 - 131/60)  BP(mean): --  RR: 17 (19 Mar 2018 04:27) (17 - 17)  SpO2: 100% (19 Mar 2018 04:27) (98% - 100%)    LABS:                        7.0    12.78 )-----------( 353      ( 19 Mar 2018 05:38 )             23.9           I&O's Summary      RADIOLOGY & ADDITIONAL STUDIES:

## 2018-03-20 VITALS
OXYGEN SATURATION: 100 % | SYSTOLIC BLOOD PRESSURE: 103 MMHG | HEART RATE: 83 BPM | RESPIRATION RATE: 18 BRPM | TEMPERATURE: 99 F | DIASTOLIC BLOOD PRESSURE: 65 MMHG

## 2018-03-20 LAB
BACTERIA BLD CULT: SIGNIFICANT CHANGE UP
BACTERIA BLD CULT: SIGNIFICANT CHANGE UP

## 2018-03-20 PROCEDURE — 99239 HOSP IP/OBS DSCHRG MGMT >30: CPT

## 2018-03-20 PROCEDURE — 99232 SBSQ HOSP IP/OBS MODERATE 35: CPT

## 2018-03-20 RX ORDER — METOPROLOL TARTRATE 50 MG
1 TABLET ORAL
Qty: 60 | Refills: 0 | OUTPATIENT
Start: 2018-03-20 | End: 2018-04-18

## 2018-03-20 RX ORDER — ACETAMINOPHEN 500 MG
2 TABLET ORAL
Qty: 0 | Refills: 0 | COMMUNITY
Start: 2018-03-20

## 2018-03-20 RX ORDER — OXYCODONE HYDROCHLORIDE 5 MG/1
1 TABLET ORAL
Qty: 40 | Refills: 0 | OUTPATIENT
Start: 2018-03-20 | End: 2018-03-29

## 2018-03-20 RX ORDER — GABAPENTIN 400 MG/1
1 CAPSULE ORAL
Qty: 60 | Refills: 0 | OUTPATIENT
Start: 2018-03-20 | End: 2018-04-18

## 2018-03-20 RX ORDER — MAGNESIUM OXIDE 400 MG ORAL TABLET 241.3 MG
1 TABLET ORAL
Qty: 0 | Refills: 0 | COMMUNITY

## 2018-03-20 RX ORDER — ACETAMINOPHEN 500 MG
2 TABLET ORAL
Qty: 56 | Refills: 0 | OUTPATIENT
Start: 2018-03-20 | End: 2018-03-26

## 2018-03-20 RX ORDER — METOPROLOL TARTRATE 50 MG
1 TABLET ORAL
Qty: 0 | Refills: 0 | COMMUNITY

## 2018-03-20 RX ADMIN — Medication 650 MILLIGRAM(S): at 14:30

## 2018-03-20 RX ADMIN — Medication 650 MILLIGRAM(S): at 06:26

## 2018-03-20 RX ADMIN — GABAPENTIN 600 MILLIGRAM(S): 400 CAPSULE ORAL at 06:26

## 2018-03-20 RX ADMIN — Medication 25 MILLIGRAM(S): at 17:55

## 2018-03-20 RX ADMIN — PIPERACILLIN AND TAZOBACTAM 25 GRAM(S): 4; .5 INJECTION, POWDER, LYOPHILIZED, FOR SOLUTION INTRAVENOUS at 06:26

## 2018-03-20 RX ADMIN — Medication 25 MILLIGRAM(S): at 06:26

## 2018-03-20 RX ADMIN — GABAPENTIN 600 MILLIGRAM(S): 400 CAPSULE ORAL at 17:55

## 2018-03-20 RX ADMIN — Medication 650 MILLIGRAM(S): at 17:55

## 2018-03-20 RX ADMIN — Medication 650 MILLIGRAM(S): at 18:50

## 2018-03-20 RX ADMIN — Medication 650 MILLIGRAM(S): at 13:33

## 2018-03-20 RX ADMIN — Medication 650 MILLIGRAM(S): at 07:19

## 2018-03-20 RX ADMIN — OXYCODONE HYDROCHLORIDE 10 MILLIGRAM(S): 5 TABLET ORAL at 06:51

## 2018-03-20 RX ADMIN — PIPERACILLIN AND TAZOBACTAM 25 GRAM(S): 4; .5 INJECTION, POWDER, LYOPHILIZED, FOR SOLUTION INTRAVENOUS at 13:33

## 2018-03-20 NOTE — PROGRESS NOTE ADULT - PROVIDER SPECIALTY LIST ADULT
Cardiology
Infectious Disease
Infectious Disease
Internal Medicine
Palliative Care
Cardiology

## 2018-03-20 NOTE — PROGRESS NOTE ADULT - ASSESSMENT
73 year old female with Stage IVB Endometrial CA (s/p 7 cycles of carboplatin/taxol last chemo in November 2017), Atrial fibrillation on apixaban, and HTN who presented to the ED with 3 days of abdominal pain.     Diagnosed with metastatic endometrial cancer in April 2017 with mets to the lungs and lytic lesions in the left 6th rib along with the R superior pubic ramus.     Recently admitted 11/29-12/8 for perforated sigmoid diverticulitis with IR guided drainage    Febrile to 101F in the ED, tachycardic to 100bpm, leukocytosis to 13K, elevated lactate 5.6. CT with new large fluid and air-filled cavity in the uterus measuring 10.4 x 6.6x 9.1 cm. Differential includes sepsis from uterine abscess vs necrotic tumor vs other etiology. Per primary team, they discussed with radiology regarding obtaining an MRI to further characterize this mass and it was determined that an MRI would not provide any additional information more than the ct already performed. They recommended to treat with antibiotics first and then obtain an MRI after completion of antibiotics to assess the mass.    Recommend:  -Continue zosyn  -Unclear if uterine abscess vs necrotic tumor - can transition to augmentin 875/125 mg PO BID to complete a 14-day course.   -After completion of antibiotics, if within GOC can obtain repeat imaging      Jesús Choi MD  Pager [phone]  After 5pm/weekends call [phone]

## 2018-03-20 NOTE — PROGRESS NOTE ADULT - PROBLEM SELECTOR PROBLEM 4
Atrial fibrillation
Encounter for palliative care
Atrial fibrillation

## 2018-03-20 NOTE — PROGRESS NOTE ADULT - SUBJECTIVE AND OBJECTIVE BOX
SUBJECTIVE: No CP or SOB, reports no vaginal bleeding overnight      MEDICATIONS  (STANDING):  gabapentin 600 milliGRAM(s) Oral two times a day  influenza   Vaccine 0.5 milliLiter(s) IntraMuscular once  metoprolol     tartrate 25 milliGRAM(s) Oral two times a day  piperacillin/tazobactam IVPB. 3.375 Gram(s) IV Intermittent every 8 hours    MEDICATIONS  (PRN):  acetaminophen   Tablet. 650 milliGRAM(s) Oral every 6 hours PRN Mild Pain (1 - 3)  oxyCODONE    IR 10 milliGRAM(s) Oral every 4 hours PRN Moderate Pain (4 - 6)      LABS:                        7.1    13.60 )-----------( 366      ( 19 Mar 2018 18:06 )             24.8     Hemoglobin: 7.1 g/dL (03-19 @ 18:06)  Hemoglobin: 7.0 g/dL (03-19 @ 05:38)  Hemoglobin: 7.7 g/dL (03-18 @ 06:48)  Hemoglobin: 7.1 g/dL (03-17 @ 05:35)  Hemoglobin: 7.5 g/dL (03-16 @ 18:48)  Creatinine Trend: 0.66<--, 0.63<--    PHYSICAL EXAM  Vital Signs Last 24 Hrs  T(C): 36.8 (20 Mar 2018 16:23), Max: 37.2 (19 Mar 2018 21:16)  T(F): 98.2 (20 Mar 2018 16:23), Max: 99 (19 Mar 2018 21:16)  HR: 57 (20 Mar 2018 16:23) (57 - 65)  BP: 127/47 (20 Mar 2018 16:23) (127/47 - 155/51)  RR: 18 (20 Mar 2018 16:23) (17 - 18)  SpO2: 100% (20 Mar 2018 16:23) (96% - 100%)      Cardiovascular:  S1S2 RRR, No JVD  Respiratory: Lungs clear to auscultation, normal effort  Gastrointestinal: Abdomen soft, ND, NT, +BS  Skin: Warm, dry, intact. No rash.  Musculoskeletal: Normal ROM, normal strength  Ext: No C/C/E B/L LE    DIAGNOSTIC DATA    RADIOLOGY:    < from: CT Abdomen and Pelvis w/ IV Cont (03.15.18 @ 06:41) >  IMPRESSION:     New large fluid and air-filled cavity within the central portionof the   uterus measuring 10.4 x 6.6 x 9.1 cm. This may represent an expanded   endometrial cavity related to an abscess although necrotic infected tumor   is also possible.    Increased heterogeneous enlargement of the lower uterine segment/cervix   likely indicates extension of the neoplasm.    Enlarging left pelvic sidewall and left internal iliac necrotic nodes.  < end of copied text >      ASSESSMENT AND PLAN:  73y Female with asthma, HTN, no known h/o CAD/MI, atrial fibrillation on Eliquis, with NL LV function on TTE 12/2016, with unremarkable plain TST 3/2016,  stage 4 metastatic endometrial carcinoma (to lung, left ribs, pubic ramus- not surgical candidate s/p chemo with Carboplatin and Taxol - last dose 11/17) previously admitted for sepsis 2/2 perforated sigmoid diverticulitis now admitted with abdominal pain and vaginal bleeding. She was febrile in the ED to 101. CT a/p revealed large amount of fluid/air in uterus with necrotic tumor and progression of disease.  She was found to be markedly anemic with Hgb 6.    -- onc eval noted - not candidate for treatment surgically or with chemo at this time  -- ID noted - +urine cx with GNR, negative blood cx to date - on IV abx  -- presently Eliquis on hold/ stopped given significant anemia/ vaginal bleeding  -- Pt to be DC with home hospice  -- f/u with Dr Becker upon dc for cardiology       Lorena Bianchi PA-C  [facility]  [address], [address]   [place] [address]  office [phone]  pager [phone]

## 2018-03-20 NOTE — PROGRESS NOTE ADULT - PROBLEM SELECTOR PLAN 6
- holding chemical anticoagulation given anemia   - regular diet  - Dispo pending GOC- hospice referral made

## 2018-03-20 NOTE — PROGRESS NOTE ADULT - ASSESSMENT
73F w/ PMhx of significant for Stage IVB Endometrial CA (s/p 7 cycles of carboplatin/taxol last chemo in November 2017), Atrial fibrillation on apixaban, and HTN who presents to the ED with 3 days of abdominal pain, found to have worsening metastasis and hematuria likely secondary to necrotic pelvic abscess which cannot be intervened on, with likely placement to home with home hospice, pending hospice evaluation.

## 2018-03-20 NOTE — PROGRESS NOTE ADULT - ATTENDING COMMENTS
Patient seen and examined.  Agree with above.   -AC on hold due to severe bleeding  -no further cardiac workup needed at this time  -follow up hospice and palliative care    Giovanny Hood MD  [facility]  [address] [place] [address], [address]  [place] [address]  office: [phone]  pager: [phone]
Patient seen and examined.  Agree with above.   -AC on hold for bleeding  -no further cardiac workup needed at this time    Giovanny Hood MD  [facility]  [address], [address]  [place] [address]  office: [phone]  pager: [phone]
CARDIOLOGY ATTENDING    Agree with above. AC on hold given severe anemia and bleeding from advancing uterine CA. Follow up palliative care. No further inpatient cardiac workup needed. Poor short/long term prognosis.
Patient seen and examined.  Agree with NP note.
Patient seen and examined.  Agree with above as edited.  Patient a/w sepsis 2/2 infected necrotic endometrial cancer.  IV zosyn. ID recs appreciated. Case dw IR and Gyn-Onc but per them but would be difficult to drain as unclear if area on CT is tumor or abscess without further imaging. Will f/u with ID further.  UTI with hematuria - on IV zosyn  Endometrial cancer - Oncology and gyn-onc c/s's.    Vaginal bleeding - monitoring h/h. Hold apixaban
As above as edited. 74 yo F w/ Stage IVB Endometrial CA (s/p 7 cycles of carboplatin/taxol last chemo in November 2017), Atrial fibrillation on apixaban, and HTN who presents to the ED with 3 days of abdominal pain, found to have worsening metastasis and hematuria likely secondary to necrotic pelvic abscess. As further imaging at this time would be of no benefit to the patient to distinguish necrotic tumor vs abscess, case d/w ID who recommend 14 day course of Augmentin. Patient seen and evaluated by hospice, pt amenable to hospice services. Patient to be discharged home this evening with hospice.    TIME SPENT ON DISCHARGE: 40mins
As above.  Patient a/w sepsis 2/2 infected necrotic endometrial cancer. Improving on IV Zosyn and H/H now stable.   ID recs appreciated. Case dw IR and Gyn-Onc but per them but would be difficult to drain uterine collection as unclear if area on CT is tumor or abscess without further imaging. Will f/u with ID further.  UTI with hematuria - on IV zosyn  Endometrial cancer - Oncology and gyn-onc recs appreciated.    Vaginal bleeding - monitoring h/h. Hold apixaban. H/H currently stable
As above as edited. 74 yo F w/ Stage IVB Endometrial CA (s/p 7 cycles of carboplatin/taxol last chemo in November 2017), Atrial fibrillation on apixaban, and HTN who presents to the ED with 3 days of abdominal pain, found to have worsening metastasis and hematuria likely secondary to necrotic pelvic abscess. Initially, radiology recommended MRI with contrast to further differentiate if patient's findings on CT were infectious or malignant, but now stating MRI would not be able to differentiate and would recommend CT scan in 6 weeks to determine if patient's findings on CT scan improve with abx. Will discuss further with ID. Patient also interested in hospice referral. Hospice referral placed. Patient with acute blood loss anemia 2/2 hematuria in the setting of endometrial CA and necrotic tumor vs uterine abscess. H/H slowly downtrending, will repeat this afternoon. Transfuse if repeat H/H <7.
Patient seen and examined.  Case discussed with house staff.  Agree with above as edited.  Patient a/w sepsis 2/2 infected necrotic endometrial cancer.  IV zosyn. If spikes through abx, will add vancomycin.  ID consulted.  BCxs pending  UTI with hematuria - on IV zosyn  Endometrial cancer - Oncology and gyn-onc c/s's.    Vaginal bleeding - monitoring h/h. Hold apixaban  Hypokalemia - Replete with KCl and monitor.

## 2018-03-20 NOTE — PROGRESS NOTE ADULT - PROBLEM SELECTOR PLAN 3
- stage IV endometrial cancer- not candidate for surgical or chemotherapeutic intervention  - pain control with oxycodone and tylenol, gabapentin for neuropathy  - hospice contacted for evaluation- likely to go home with home hospice

## 2018-03-20 NOTE — PROGRESS NOTE ADULT - SUBJECTIVE AND OBJECTIVE BOX
CC: Patient is a 73y old  Female who presents with a chief complaint of Abdominal pain     Interval History/ROS: Patient remains with some abdominal discomfort. Has no other complaints. Denies fever, chills.    Allergies  No Known Allergies    ANTIMICROBIALS:  piperacillin/tazobactam IVPB. 3.375 every 8 hours    PE:    Vital Signs Last 24 Hrs  T(C): 36.6 (20 Mar 2018 05:59), Max: 37.3 (19 Mar 2018 15:11)  T(F): 97.8 (20 Mar 2018 05:59), Max: 99.1 (19 Mar 2018 15:11)  HR: 65 (20 Mar 2018 05:59) (57 - 65)  BP: 155/51 (20 Mar 2018 05:59) (121/59 - 155/51)  BP(mean): --  RR: 18 (20 Mar 2018 05:59) (16 - 18)  SpO2: 100% (20 Mar 2018 05:59) (96% - 100%)    Gen: AOx3, NAD, non-toxic, pleasant  CV: S1+S2 normal, no murmurs  Resp: Clear bilat, no resp distress  Abd: Soft, nontender, +BS  Ext: No LE edema, no wounds  : No Feng  IV/Skin: No thrombophlebitis  Neuro: no focal deficits    LABS:                          7.1    13.60 )-----------( 366      ( 19 Mar 2018 18:06 )             24.8     MICROBIOLOGY:  v  BLOOD PERIPHERAL  03-15-18 --  --  --      URINE MIDSTREAM  03-15-18 --  --  --    RADIOLOGY:    No new images.

## 2018-03-20 NOTE — PROGRESS NOTE ADULT - PROBLEM SELECTOR PLAN 1
- source of blood loss is likely endometrial cancer with invasions of bladder causing hematuria  - CBC holding at 7.1, active type and screen and signed blood consent is in the chart  - holding apixiban in setting of blood loss

## 2018-03-20 NOTE — PROGRESS NOTE ADULT - PROBLEM SELECTOR PLAN 2
- leukocytosis on CBC this morning- stable from prior  - infection likely 2/2  necrotic mass vs abcess revealed in CT scan  - per conversation with Dr. Gupta (radiology attending for body MRI)- will not be able to better assess abcess vs tumor- recommends MRi after 6 weeks to evaluate  - zosyn day 6 today- switch to augmentin on d/c

## 2018-03-20 NOTE — PROGRESS NOTE ADULT - NSHPATTENDINGPLANDISCUSS_GEN_ALL_CORE
Dr. Guajardo
Dr. Guajardo, primary team
Patient, HS2
Patient, Dr. Chaparro
Patient, HS2
Patient, HS2

## 2018-03-20 NOTE — PROGRESS NOTE ADULT - SUBJECTIVE AND OBJECTIVE BOX
Anika Yung PGY 1  Pager: [phone]/ [phone]    Patient is a 73y old  Female who presents with a chief complaint of Abdominal pain (17 Mar 2018 18:01)      SUBJECTIVE / OVERNIGHT EVENTS:    MEDICATIONS  (STANDING):  gabapentin 600 milliGRAM(s) Oral two times a day  influenza   Vaccine 0.5 milliLiter(s) IntraMuscular once  metoprolol     tartrate 25 milliGRAM(s) Oral two times a day  piperacillin/tazobactam IVPB. 3.375 Gram(s) IV Intermittent every 8 hours    MEDICATIONS  (PRN):  acetaminophen   Tablet. 650 milliGRAM(s) Oral every 6 hours PRN Mild Pain (1 - 3)  oxyCODONE    IR 10 milliGRAM(s) Oral every 4 hours PRN Moderate Pain (4 - 6)      OBJECTIVE:    Vital Signs Last 24 Hrs  T(C): 36.6 (20 Mar 2018 05:59), Max: 37.3 (19 Mar 2018 15:11)  T(F): 97.8 (20 Mar 2018 05:59), Max: 99.1 (19 Mar 2018 15:11)  HR: 65 (20 Mar 2018 05:59) (57 - 65)  BP: 155/51 (20 Mar 2018 05:59) (121/59 - 155/51)  BP(mean): --  RR: 18 (20 Mar 2018 05:59) (16 - 18)  SpO2: 100% (20 Mar 2018 05:59) (96% - 100%)    CAPILLARY BLOOD GLUCOSE        I&O's Summary      PHYSICAL EXAM:  GENERAL: NAD, well-developed  HEAD:  Atraumatic, Normocephalic  EYES: EOMI, PERRLA, conjunctiva and sclera clear  NECK: Supple, No JVD  CHEST/LUNG: Clear to auscultation bilaterally; No wheeze  HEART: Regular rate and rhythm; No murmurs, rubs, or gallops  ABDOMEN: Soft, Nontender, Nondistended; Bowel sounds present  EXTREMITIES:  2+ Peripheral Pulses, No clubbing, cyanosis, or edema  PSYCH: AAOx3  NEUROLOGY: non-focal  SKIN: No rashes or lesions    LABS:                        7.1    13.60 )-----------( 366      ( 19 Mar 2018 18:06 )             24.8     Auto Eosinophil # x     / Auto Eosinophil % x     / Auto Neutrophil # x     / Auto Neutrophil % x     / BANDS % x                            7.0    12.78 )-----------( 353      ( 19 Mar 2018 05:38 )             23.9     Auto Eosinophil # x     / Auto Eosinophil % x     / Auto Neutrophil # x     / Auto Neutrophil % x     / BANDS % x                    Lactate, Blood: 1.1 mmol/L (03-16 @ 09:17)      RESPIRATORY  VENT:    ABG:     VBG:     RADIOLOGY & ADDITIONAL TESTS:  (Imaging Personally Reviewed)    Consultant(s) Notes Reviewed:      Care Discussed with Consultants/Other Providers: Anika Aga PGY 1  Pager: [phone]/ [phone]    Patient is a 73y old  Female who presents with a chief complaint of Abdominal pain (17 Mar 2018 18:01)      SUBJECTIVE / OVERNIGHT EVENTS:  No acute events overnight. Patient reports she is doing well and her pain is adequately controlled.    MEDICATIONS  (STANDING):  gabapentin 600 milliGRAM(s) Oral two times a day  influenza   Vaccine 0.5 milliLiter(s) IntraMuscular once  metoprolol     tartrate 25 milliGRAM(s) Oral two times a day  piperacillin/tazobactam IVPB. 3.375 Gram(s) IV Intermittent every 8 hours    MEDICATIONS  (PRN):  acetaminophen   Tablet. 650 milliGRAM(s) Oral every 6 hours PRN Mild Pain (1 - 3)  oxyCODONE    IR 10 milliGRAM(s) Oral every 4 hours PRN Moderate Pain (4 - 6)      OBJECTIVE:    Vital Signs Last 24 Hrs  T(C): 36.6 (20 Mar 2018 05:59), Max: 37.3 (19 Mar 2018 15:11)  T(F): 97.8 (20 Mar 2018 05:59), Max: 99.1 (19 Mar 2018 15:11)  HR: 65 (20 Mar 2018 05:59) (57 - 65)  BP: 155/51 (20 Mar 2018 05:59) (121/59 - 155/51)  BP(mean): --  RR: 18 (20 Mar 2018 05:59) (16 - 18)  SpO2: 100% (20 Mar 2018 05:59) (96% - 100%)    CAPILLARY BLOOD GLUCOSE        I&O's Summary      PHYSICAL EXAM:  GENERAL: NAD, well-developed  HEAD:  Atraumatic, Normocephalic  EYES: EOMI, PERRLA, conjunctiva and sclera clear  NECK: Supple, No JVD  CHEST/LUNG: Clear to auscultation bilaterally; No wheeze  HEART: Regular rate and rhythm; No murmurs, rubs, or gallops  ABDOMEN: Soft, Nontender, Nondistended; Bowel sounds present  EXTREMITIES:  2+ Peripheral Pulses, No clubbing, cyanosis, or edema  PSYCH: AAOx3  NEUROLOGY: non-focal  SKIN: No rashes or lesions    LABS:                        7.1    13.60 )-----------( 366      ( 19 Mar 2018 18:06 )             24.8     Auto Eosinophil # x     / Auto Eosinophil % x     / Auto Neutrophil # x     / Auto Neutrophil % x     / BANDS % x                            7.0    12.78 )-----------( 353      ( 19 Mar 2018 05:38 )             23.9     Auto Eosinophil # x     / Auto Eosinophil % x     / Auto Neutrophil # x     / Auto Neutrophil % x     / BANDS % x                    Lactate, Blood: 1.1 mmol/L (03-16 @ 09:17)      RESPIRATORY  VENT:    ABG:     VBG:     RADIOLOGY & ADDITIONAL TESTS:  (Imaging Personally Reviewed)    Consultant(s) Notes Reviewed:      Care Discussed with Consultants/Other Providers:

## 2018-05-03 ENCOUNTER — INPATIENT (INPATIENT)
Facility: HOSPITAL | Age: 73
LOS: 0 days | Discharge: HOSPICE | End: 2018-05-03
Attending: INTERNAL MEDICINE | Admitting: INTERNAL MEDICINE
Payer: MEDICARE

## 2018-05-03 ENCOUNTER — INPATIENT (INPATIENT)
Facility: HOSPITAL | Age: 73
LOS: 4 days | Discharge: HOSPICE | End: 2018-05-08
Attending: INTERNAL MEDICINE | Admitting: INTERNAL MEDICINE
Payer: OTHER MISCELLANEOUS

## 2018-05-03 VITALS
DIASTOLIC BLOOD PRESSURE: 76 MMHG | RESPIRATION RATE: 16 BRPM | HEART RATE: 89 BPM | OXYGEN SATURATION: 100 % | HEIGHT: 60 IN | WEIGHT: 100.09 LBS | SYSTOLIC BLOOD PRESSURE: 133 MMHG

## 2018-05-03 VITALS
SYSTOLIC BLOOD PRESSURE: 119 MMHG | TEMPERATURE: 100 F | HEIGHT: 58 IN | RESPIRATION RATE: 18 BRPM | DIASTOLIC BLOOD PRESSURE: 61 MMHG | WEIGHT: 82.45 LBS | HEART RATE: 74 BPM | OXYGEN SATURATION: 97 %

## 2018-05-03 VITALS
TEMPERATURE: 98 F | SYSTOLIC BLOOD PRESSURE: 127 MMHG | DIASTOLIC BLOOD PRESSURE: 48 MMHG | RESPIRATION RATE: 18 BRPM | OXYGEN SATURATION: 100 % | HEART RATE: 77 BPM

## 2018-05-03 DIAGNOSIS — Z98.890 OTHER SPECIFIED POSTPROCEDURAL STATES: Chronic | ICD-10-CM

## 2018-05-03 DIAGNOSIS — I48.91 UNSPECIFIED ATRIAL FIBRILLATION: ICD-10-CM

## 2018-05-03 DIAGNOSIS — I10 ESSENTIAL (PRIMARY) HYPERTENSION: ICD-10-CM

## 2018-05-03 DIAGNOSIS — R56.9 UNSPECIFIED CONVULSIONS: ICD-10-CM

## 2018-05-03 DIAGNOSIS — C79.31 SECONDARY MALIGNANT NEOPLASM OF BRAIN: ICD-10-CM

## 2018-05-03 DIAGNOSIS — C54.1 MALIGNANT NEOPLASM OF ENDOMETRIUM: ICD-10-CM

## 2018-05-03 DIAGNOSIS — D49.6 NEOPLASM OF UNSPECIFIED BEHAVIOR OF BRAIN: ICD-10-CM

## 2018-05-03 LAB
ALBUMIN SERPL ELPH-MCNC: 2.2 G/DL — LOW (ref 3.3–5)
ALP SERPL-CCNC: 65 U/L — SIGNIFICANT CHANGE UP (ref 40–120)
ALT FLD-CCNC: <6 U/L — LOW (ref 12–78)
ANION GAP SERPL CALC-SCNC: 18 MMOL/L — HIGH (ref 5–17)
APTT BLD: 29.8 SEC — SIGNIFICANT CHANGE UP (ref 27.5–37.4)
AST SERPL-CCNC: 20 U/L — SIGNIFICANT CHANGE UP (ref 15–37)
BASOPHILS # BLD AUTO: 0.03 K/UL — SIGNIFICANT CHANGE UP (ref 0–0.2)
BASOPHILS NFR BLD AUTO: 0.4 % — SIGNIFICANT CHANGE UP (ref 0–2)
BILIRUB SERPL-MCNC: 0.4 MG/DL — SIGNIFICANT CHANGE UP (ref 0.2–1.2)
BLD GP AB SCN SERPL QL: SIGNIFICANT CHANGE UP
BUN SERPL-MCNC: 14 MG/DL — SIGNIFICANT CHANGE UP (ref 7–23)
CALCIUM SERPL-MCNC: 9.1 MG/DL — SIGNIFICANT CHANGE UP (ref 8.5–10.1)
CHLORIDE SERPL-SCNC: 99 MMOL/L — SIGNIFICANT CHANGE UP (ref 96–108)
CK MB BLD-MCNC: 3.7 % — HIGH (ref 0–3.5)
CK MB CFR SERPL CALC: 1 NG/ML — SIGNIFICANT CHANGE UP (ref 0.5–3.6)
CK SERPL-CCNC: 27 U/L — SIGNIFICANT CHANGE UP (ref 26–192)
CO2 SERPL-SCNC: 21 MMOL/L — LOW (ref 22–31)
CREAT SERPL-MCNC: 0.76 MG/DL — SIGNIFICANT CHANGE UP (ref 0.5–1.3)
EOSINOPHIL # BLD AUTO: 0 K/UL — SIGNIFICANT CHANGE UP (ref 0–0.5)
EOSINOPHIL NFR BLD AUTO: 0 % — SIGNIFICANT CHANGE UP (ref 0–6)
GLUCOSE SERPL-MCNC: 96 MG/DL — SIGNIFICANT CHANGE UP (ref 70–99)
HCT VFR BLD CALC: 26.1 % — LOW (ref 34.5–45)
HGB BLD-MCNC: 7.5 G/DL — LOW (ref 11.5–15.5)
IMM GRANULOCYTES NFR BLD AUTO: 0.7 % — SIGNIFICANT CHANGE UP (ref 0–1.5)
INR BLD: 1.34 RATIO — HIGH (ref 0.88–1.16)
LYMPHOCYTES # BLD AUTO: 0.88 K/UL — LOW (ref 1–3.3)
LYMPHOCYTES # BLD AUTO: 10.7 % — LOW (ref 13–44)
MCHC RBC-ENTMCNC: 25.6 PG — LOW (ref 27–34)
MCHC RBC-ENTMCNC: 28.7 GM/DL — LOW (ref 32–36)
MCV RBC AUTO: 89.1 FL — SIGNIFICANT CHANGE UP (ref 80–100)
MONOCYTES # BLD AUTO: 0.49 K/UL — SIGNIFICANT CHANGE UP (ref 0–0.9)
MONOCYTES NFR BLD AUTO: 6 % — SIGNIFICANT CHANGE UP (ref 2–14)
NEUTROPHILS # BLD AUTO: 6.76 K/UL — SIGNIFICANT CHANGE UP (ref 1.8–7.4)
NEUTROPHILS NFR BLD AUTO: 82.2 % — HIGH (ref 43–77)
NRBC # BLD: 0 /100 WBCS — SIGNIFICANT CHANGE UP (ref 0–0)
PLATELET # BLD AUTO: 679 K/UL — HIGH (ref 150–400)
POTASSIUM SERPL-MCNC: 3.1 MMOL/L — LOW (ref 3.5–5.3)
POTASSIUM SERPL-SCNC: 3.1 MMOL/L — LOW (ref 3.5–5.3)
PROT SERPL-MCNC: 8 GM/DL — SIGNIFICANT CHANGE UP (ref 6–8.3)
PROTHROM AB SERPL-ACNC: 14.7 SEC — HIGH (ref 9.8–12.7)
RBC # BLD: 2.93 M/UL — LOW (ref 3.8–5.2)
RBC # FLD: 18.7 % — HIGH (ref 10.3–14.5)
SODIUM SERPL-SCNC: 138 MMOL/L — SIGNIFICANT CHANGE UP (ref 135–145)
TROPONIN I SERPL-MCNC: <.015 NG/ML — SIGNIFICANT CHANGE UP (ref 0.01–0.04)
WBC # BLD: 8.22 K/UL — SIGNIFICANT CHANGE UP (ref 3.8–10.5)
WBC # FLD AUTO: 8.22 K/UL — SIGNIFICANT CHANGE UP (ref 3.8–10.5)

## 2018-05-03 PROCEDURE — 70450 CT HEAD/BRAIN W/O DYE: CPT | Mod: 26

## 2018-05-03 PROCEDURE — 72125 CT NECK SPINE W/O DYE: CPT | Mod: 26

## 2018-05-03 PROCEDURE — 99284 EMERGENCY DEPT VISIT MOD MDM: CPT

## 2018-05-03 PROCEDURE — 76377 3D RENDER W/INTRP POSTPROCES: CPT | Mod: 26

## 2018-05-03 PROCEDURE — 93010 ELECTROCARDIOGRAM REPORT: CPT

## 2018-05-03 PROCEDURE — 71045 X-RAY EXAM CHEST 1 VIEW: CPT | Mod: 26

## 2018-05-03 RX ORDER — LEVETIRACETAM 250 MG/1
500 TABLET, FILM COATED ORAL EVERY 12 HOURS
Qty: 0 | Refills: 0 | Status: DISCONTINUED | OUTPATIENT
Start: 2018-05-03 | End: 2018-05-04

## 2018-05-03 RX ORDER — DEXAMETHASONE 0.5 MG/5ML
6 ELIXIR ORAL EVERY 8 HOURS
Qty: 0 | Refills: 0 | Status: DISCONTINUED | OUTPATIENT
Start: 2018-05-03 | End: 2018-05-08

## 2018-05-03 RX ORDER — DEXAMETHASONE 0.5 MG/5ML
10 ELIXIR ORAL ONCE
Qty: 0 | Refills: 0 | Status: COMPLETED | OUTPATIENT
Start: 2018-05-03 | End: 2018-05-03

## 2018-05-03 RX ORDER — SODIUM CHLORIDE 9 MG/ML
3 INJECTION INTRAMUSCULAR; INTRAVENOUS; SUBCUTANEOUS ONCE
Qty: 0 | Refills: 0 | Status: COMPLETED | OUTPATIENT
Start: 2018-05-03 | End: 2018-05-03

## 2018-05-03 RX ORDER — METOPROLOL TARTRATE 50 MG
5 TABLET ORAL ONCE
Qty: 0 | Refills: 0 | Status: COMPLETED | OUTPATIENT
Start: 2018-05-03 | End: 2018-05-03

## 2018-05-03 RX ORDER — PANTOPRAZOLE SODIUM 20 MG/1
40 TABLET, DELAYED RELEASE ORAL DAILY
Qty: 0 | Refills: 0 | Status: DISCONTINUED | OUTPATIENT
Start: 2018-05-03 | End: 2018-05-08

## 2018-05-03 RX ORDER — SODIUM CHLORIDE 9 MG/ML
1000 INJECTION, SOLUTION INTRAVENOUS
Qty: 0 | Refills: 0 | Status: DISCONTINUED | OUTPATIENT
Start: 2018-05-03 | End: 2018-05-08

## 2018-05-03 RX ORDER — MORPHINE SULFATE 50 MG/1
4 CAPSULE, EXTENDED RELEASE ORAL
Qty: 0 | Refills: 0 | Status: DISCONTINUED | OUTPATIENT
Start: 2018-05-03 | End: 2018-05-08

## 2018-05-03 RX ORDER — ACETAMINOPHEN 500 MG
650 TABLET ORAL EVERY 6 HOURS
Qty: 0 | Refills: 0 | Status: DISCONTINUED | OUTPATIENT
Start: 2018-05-03 | End: 2018-05-08

## 2018-05-03 RX ORDER — LEVETIRACETAM 250 MG/1
500 TABLET, FILM COATED ORAL EVERY 12 HOURS
Qty: 0 | Refills: 0 | Status: DISCONTINUED | OUTPATIENT
Start: 2018-05-03 | End: 2018-05-03

## 2018-05-03 RX ORDER — SODIUM CHLORIDE 9 MG/ML
1000 INJECTION, SOLUTION INTRAVENOUS
Qty: 0 | Refills: 0 | Status: DISCONTINUED | OUTPATIENT
Start: 2018-05-03 | End: 2018-05-03

## 2018-05-03 RX ORDER — MORPHINE SULFATE 50 MG/1
2 CAPSULE, EXTENDED RELEASE ORAL
Qty: 0 | Refills: 0 | Status: DISCONTINUED | OUTPATIENT
Start: 2018-05-03 | End: 2018-05-08

## 2018-05-03 RX ADMIN — Medication 102 MILLIGRAM(S): at 11:34

## 2018-05-03 RX ADMIN — SODIUM CHLORIDE 30 MILLILITER(S): 9 INJECTION, SOLUTION INTRAVENOUS at 22:06

## 2018-05-03 RX ADMIN — Medication 5 MILLIGRAM(S): at 11:41

## 2018-05-03 RX ADMIN — Medication 1 MILLIGRAM(S): at 10:48

## 2018-05-03 RX ADMIN — SODIUM CHLORIDE 100 MILLILITER(S): 9 INJECTION, SOLUTION INTRAVENOUS at 14:38

## 2018-05-03 RX ADMIN — SODIUM CHLORIDE 3 MILLILITER(S): 9 INJECTION INTRAMUSCULAR; INTRAVENOUS; SUBCUTANEOUS at 11:25

## 2018-05-03 RX ADMIN — LEVETIRACETAM 420 MILLIGRAM(S): 250 TABLET, FILM COATED ORAL at 19:37

## 2018-05-03 RX ADMIN — Medication 6 MILLIGRAM(S): at 22:05

## 2018-05-03 NOTE — H&P ADULT - HISTORY OF PRESENT ILLNESS
· HPI Objective Statement: 72 yo female with pmh Stage IV Endometrial CA (s/p 7 cycles of carboplatin/taxol last chemo in November 2017), AF on apixaban, HTN presents with ams.  Per EMS, neighbor called and she didn't respond and came to check on her and noted she was altered and unresponsive. On route, pt had 30 sec tonic clonic seizure with EMS, no meds given. In er, pt had partial seizure x 2, ~ q15 secs each that stopped without meds.  Pt noted to be moving left side more spon then rt.  Pt seen yesterday without complaints. Per EMS at [facility], pt converside and oriented but just weak.  Pt admitted 6 weeks ago for abscess vs necrotic tumor, was on abx. No further treatment able to be offered per note for endometrial ca. · HPI Objective Statement: 74 yo female with pmh Stage IV Endometrial CA (s/p 7 cycles of carboplatin/taxol last chemo in November 2017), AF on apixaban, HTN presents with ams.  Per EMS, neighbor called and she didn't respond and came to check on her and noted she was altered and unresponsive. On route, pt had 30 sec tonic clonic seizure with EMS, no meds given. In er, pt had partial seizure x 2, ~ q15 secs each that stopped without meds.  Pt noted to be moving left side more spon then rt.  Pt seen yesterday without complaints. Per EMS at [facility], pt converside and oriented but just weak.  Pt admitted 6 weeks ago for abscess vs necrotic tumor, was on abx. No further treatment able to be offered per note for endometrial cancer.

## 2018-05-03 NOTE — ED ADULT TRIAGE NOTE - CHIEF COMPLAINT QUOTE
Seizure in route in ambulance, call for change in mental status, unknown time of onset, IV left AC, appears lethargic and non verbal Seizure in route in ambulance, call for change in mental status, unknown time of onset, IV left AC, appears lethargic and non verbal, FS 93

## 2018-05-03 NOTE — ED ADULT NURSE NOTE - OBJECTIVE STATEMENT
per levar per chani at bedside pt had a seizure at home witness by jacqueline and one on the way to the hospital with EMS, also reported that pt was c/o headache yesterday

## 2018-05-03 NOTE — H&P ADULT - NSHPPHYSICALEXAM_GEN_ALL_CORE
GENERAL: NAD well-developed  HEAD:  Atraumatic, Normocephalic  EYES: EOMI, PERRLA, conjunctiva and sclera clear  ENMT: No tonsillar erythema, exudates, or enlargement; Moist mucous membranes, Good dentition, No lesions  NECK: Supple, No JVD, Normal thyroid  NERVOUS SYSTEM:  Alert & Oriented X3, Good concentration; Motor Strength 5/5 B/L upper and lower extremities; DTRs 2+ intact and symmetric  CHEST/LUNG: Clear to percussion bilaterally; No rales, rhonchi, wheezing, or rubs  HEART: Regular rate and rhythm; No murmurs, rubs, or gallops  ABDOMEN: Soft, Nontender, Nondistended; Bowel sounds present  EXTREMITIES:  2+ Peripheral Pulses, No clubbing, cyanosis, or edema  LYMPH: No lymphadenopathy   SKIN: No rashes or lesions GENERAL: thin   HEAD:  Atraumatic, Normocephalic  EYES: EOMI, PERRLA, conjunctiva and sclera clear  ENMT: No tonsillar erythema, exudates, or enlargement; Moist mucous membranes, Good dentition, No lesions  NECK: Supple, No JVD, Normal thyroid  NERVOUS SYSTEM:  lethargic   CHEST/LUNG: Clear to percussion bilaterally; No rales, rhonchi, wheezing, or rubs  HEART: Regular rate and rhythm; No murmurs, rubs, or gallops  ABDOMEN: distended hard mass in lower abdomen   EXTREMITIES:  2+ Peripheral Pulses, No clubbing, cyanosis, or edema  LYMPH: No lymphadenopathy   SKIN: No rashes or lesions

## 2018-05-03 NOTE — H&P ADULT - ASSESSMENT
74 female with history of metastatic endometrial cancer with seizure and intracerbral lesion and ? bleed- explained to family that she probably has another primary - patient on home hospice and family wants inpatient hospice     IMPROVE VTE Individual Risk Assessment          RISK                                                          Points    [  ] Previous VTE                                                3    [  ] Thrombophilia                                             2    [  ] Lower limb paralysis                                    2        (unable to hold up >15 seconds)      [x  ] Current Cancer                                             2         (within 6 months)    [ x ] Immobilization > 24 hrs                              1    [  ] ICU/CCU stay > 24 hours                            1    [  x] Age > 60                                                    1    IMPROVE VTE Score __6_______  no vte as sje has a brain bleed

## 2018-05-03 NOTE — ED ADULT NURSE NOTE - CHIEF COMPLAINT QUOTE
Seizure in route in ambulance, call for change in mental status, unknown time of onset, IV left AC, appears lethargic and non verbal, FS 93

## 2018-05-03 NOTE — ED ADULT NURSE REASSESSMENT NOTE - NS ED NURSE REASSESS COMMENT FT1
pt awake at this time remain nonverbal, able to move to the left side on stretcher, admitted report given to receiving RN, ED  made aware awaiting for trabsport

## 2018-05-03 NOTE — H&P ADULT - NSHPLABSRESULTS_GEN_ALL_CORE
7.5    8.22  )-----------( 679      ( 03 May 2018 11:14 )             26.1   05-03    138  |  99  |  14  ----------------------------<  96  3.1<L>   |  21<L>  |  0.76    Ca    9.1      03 May 2018 11:14    TPro  8.0  /  Alb  2.2<L>  /  TBili  0.4  /  DBili  x   /  AST  20  /  ALT  <6<L>  /  AlkPhos  65  05-03  < from: Xray Chest 1 View AP/PA. (05.03.18 @ 12:08) >    FINDINGS AND   IMPRESSION: There is no focal consolidation or pleural effusion. Heart   size within normal limits. The osseous structures are intact.     < from: CT 3D Reconstruct w/ Workstation (05.03.18 @ 11:24) >    MPRESSION:    Cervical CT negative for acute fracture. Multilevel disc disease and   degenerative changes, most prevalent from C5 to C7.    < from: CT Head No Cont (05.03.18 @ 11:24) >    1)  there is a a small lesion in the left temporal lobe, which measures  approximately 1.9 cm. There is low-level surrounding edema, suggesting   vasogenic edema. The lesion appears to be heterogeneous with areas of   hemorrhage and necrosis and/or cystic change. A workup is recommended for   possible hemorrhagic metastatic lesion. MR imaging with and without   gadolinium may be considered. This assumes no MR contraindications..  2)  chronic ischemic changes are noted in both hemispheres with volume   loss.

## 2018-05-03 NOTE — PHARMACY COMMUNICATION NOTE - COMMENTS
spoke to genaro on the phone, she said pt is being transferred to hospice and wants to keep the morphine IVP orders PRN for pain q2h. she spoke with patient and they were on MS contin at home but genaro wants to just keep her on the PRN meds here while she is in hospice and will monitor patient.

## 2018-05-03 NOTE — CONSULT NOTE ADULT - ASSESSMENT
Pt is minimally responsive, showing right sided weakness.  Needs GIP level of care for seizure management.   Granddaughter who is PCG does not want pt to be admitted to the [facility] and so the pt being admitted as a hospice pt at [facility].  Discussed with Dr. Harmon who is in agreement with pt being admitted under hospice.  Dr. Dixon to be attending and he is aware.  Pt awaiting bed transfer to  when available today.

## 2018-05-03 NOTE — PHARMACY COMMUNICATION NOTE - COMMENTS
spoke to NORMA Pritchard, confirmed she wanted the 6mg decadron IVP q8h around the clock for treatment.

## 2018-05-03 NOTE — PHARMACY COMMUNICATION NOTE - COMMENTS
as per other note- verified she wants the morphine IVP prn orders q2h. patient is being transferred to hospice and will monitor patients pain

## 2018-05-03 NOTE — ED PROVIDER NOTE - MEDICAL DECISION MAKING DETAILS
d/w joon who is HCP, understand prognosis.  States pt request is DNR and will continue without. no aggressive treatment. [facility]. dr catherine and dr lamas aware. at this time, reversal with kcentra will likely be futile.

## 2018-05-03 NOTE — PROGRESS NOTE ADULT - SUBJECTIVE AND OBJECTIVE BOX
· HPI Objective Statement: 74 yo female with pmh Stage IV Endometrial CA (s/p 7 cycles of carboplatin/taxol last chemo in November 2017), AF on apixaban, HTN presents with ams.  Per EMS, neighbor called and she didn't respond and came to check on her and noted she was altered and unresponsive. On route, pt had 30 sec tonic clonic seizure with EMS, no meds given. In er, pt had partial seizure x 2, ~ q15 secs each that stopped without meds.  Pt noted to be moving left side more spon then rt.  Pt seen yesterday without complaints. Per EMS at [facility], pt converside and oriented but just weak.  Pt admitted 6 weeks ago for abscess vs necrotic tumor, was on abx. No further treatment able to be offered per note for endometrial ca.	    ALLERGIES AND HOME MEDICATIONS:   Allergies:        Allergies:  	No Known Allergies:     Home Medications:   * Patient Currently Takes Medications as of 20-Mar-2018 18:39 documented in Structured Notes  · 	metoprolol tartrate 25 mg oral tablet: 1 tab(s) orally 2 times a day  · 	amoxicillin-clavulanate 875 mg-125 mg oral tablet: 1 tab(s) orally 2 times a day   · 	gabapentin 600 mg oral tablet: 1 tab(s) orally 2 times a day   · 	oxyCODONE 10 mg oral tablet: 1 tab(s) orally every 6 hours, As Needed MDD:4 tabs / day  · 	acetaminophen 325 mg oral tablet: 2 tab(s) orally every 6 hours, As needed, Mild Pain (1 - 3)    PHYSICAL EXAM:   · Physical Examination: Gen: Alert, eyes closed  Head: NC, AT, PERRL, EOMI, normal lids/conjunctiva   ENT: Bilateral TM WNL, normal hearing, patent oropharynx without erythema/exudate, uvula midline  Neck: supple, no tenderness/meningismus/JVD   Pulm: Bilateral clear BS, normal resp effort, no wheeze/stridor/retractions  CV: RRR, no M/R/G, +dist pulses   Abd: soft, NT/ND, +BS, no guarding/rebound tenderness  Mskel: no edema/erythema/cyanosis   Skin: no rash  Neuro: eyes closed, responding on left side with eye whincing but not moving rt side	    CURRENT ORDERS/:   · 	LORazepam   Injectable, [Ordered as ATIVAN Injectable]  	1 milliGRAM(s), IV Push, Once, Stop After 1 Doses  	Administration Instructions: This is a Look-alike/Sound-alike Medication  	Provider's Contact #: [phone], 03-May-2018, Active, 03-May-2018, Standard  · 	sodium chloride 0.9% lock flush, 3 milliLiter(s), IV Push, once, Stop After 1 Doses, 03-May-2018, Active, 03-May-2018, Standard  · 	Cardiac Monitor Bedside,   Time/Priority:  STAT, 03-May-2018, Active, Standard  · 	Saline Lock Insert.,   Time/Priority:  STAT, 03-May-2018, Active, Standard    DISPOSITION:    Care Plan - Instructions:  Principal Discharge DX:	Brain tumor  Secondary Diagnosis:	Seizure  Secondary Diagnosis:	Atrial fibrillation with RVR.     Impression:  Principal Discharge Dx Brain tumor.     Secondary Discharge Dx Seizure.     Secondary Discharge Dx Atrial fibrillation with RVR.    Met w HC GRAD dghtr  agrees w comfort care but adm to floor to tx her current sx  agreed to have Hospice see her later  KPS v low  PPS v poor  total care  terminal endometrial cancer  agree w sx management and DNR/DNI

## 2018-05-03 NOTE — CONSULT NOTE ADULT - SUBJECTIVE AND OBJECTIVE BOX
72 yo female on home hospice program with Stage IV Endometrial CA (s/p 7 cycles of carboplatin/taxol last chemo in November 2017), AF on apixaban, HTN presented to ER with AMS and seizure.  Pt admitted 6 weeks ago for abscess vs necrotic tumor, was on abx. CT head today revealed:   IMPRESSION:    1)  there is a a small lesion in the left temporal lobe, which measures   approximately 1.9 cm. There is low-level surrounding edema, suggesting   vasogenic edema. The lesion appears to be heterogeneous with areas of   hemorrhage and necrosis and/or cystic change. A workup is recommended for   possible hemorrhagic metastatic lesion. MR imaging with and without   gadolinium may be considered. This assumes no MR contraindications..  2)  chronic ischemic changes are noted in both hemispheres with volume   loss.      MEDICATIONS  (STANDING):  dextrose 5% + sodium chloride 0.9%. 1000 milliLiter(s) (100 mL/Hr) IV Continuous <Continuous>  levETIRAcetam  IVPB 500 milliGRAM(s) IV Intermittent every 12 hours    MEDICATIONS  (PRN):

## 2018-05-03 NOTE — ED PROVIDER NOTE - CARE PLAN
Principal Discharge DX:	Brain tumor  Secondary Diagnosis:	Seizure  Secondary Diagnosis:	Atrial fibrillation with RVR

## 2018-05-03 NOTE — ED PROVIDER NOTE - PHYSICAL EXAMINATION
Gen: Alert, eyes closed  Head: NC, AT, PERRL, EOMI, normal lids/conjunctiva   ENT: Bilateral TM WNL, normal hearing, patent oropharynx without erythema/exudate, uvula midline  Neck: supple, no tenderness/meningismus/JVD   Pulm: Bilateral clear BS, normal resp effort, no wheeze/stridor/retractions  CV: RRR, no M/R/G, +dist pulses   Abd: soft, NT/ND, +BS, no guarding/rebound tenderness  Mskel: no edema/erythema/cyanosis   Skin: no rash   Neuro: eyes closed, responding on left side with eye whincing but not moving rt side

## 2018-05-04 DIAGNOSIS — C54.1 MALIGNANT NEOPLASM OF ENDOMETRIUM: ICD-10-CM

## 2018-05-04 DIAGNOSIS — J45.909 UNSPECIFIED ASTHMA, UNCOMPLICATED: ICD-10-CM

## 2018-05-04 DIAGNOSIS — I10 ESSENTIAL (PRIMARY) HYPERTENSION: ICD-10-CM

## 2018-05-04 DIAGNOSIS — I48.91 UNSPECIFIED ATRIAL FIBRILLATION: ICD-10-CM

## 2018-05-04 PROCEDURE — 99252 IP/OBS CONSLTJ NEW/EST SF 35: CPT

## 2018-05-04 PROCEDURE — 99231 SBSQ HOSP IP/OBS SF/LOW 25: CPT

## 2018-05-04 RX ORDER — LEVETIRACETAM 250 MG/1
1000 TABLET, FILM COATED ORAL EVERY 12 HOURS
Qty: 0 | Refills: 0 | Status: DISCONTINUED | OUTPATIENT
Start: 2018-05-05 | End: 2018-05-08

## 2018-05-04 RX ORDER — LEVETIRACETAM 250 MG/1
750 TABLET, FILM COATED ORAL EVERY 12 HOURS
Qty: 0 | Refills: 0 | Status: DISCONTINUED | OUTPATIENT
Start: 2018-05-04 | End: 2018-05-04

## 2018-05-04 RX ORDER — IPRATROPIUM/ALBUTEROL SULFATE 18-103MCG
3 AEROSOL WITH ADAPTER (GRAM) INHALATION EVERY 6 HOURS
Qty: 0 | Refills: 0 | Status: DISCONTINUED | OUTPATIENT
Start: 2018-05-04 | End: 2018-05-04

## 2018-05-04 RX ORDER — ALBUTEROL 90 UG/1
1 AEROSOL, METERED ORAL EVERY 4 HOURS
Qty: 0 | Refills: 0 | Status: DISCONTINUED | OUTPATIENT
Start: 2018-05-04 | End: 2018-05-08

## 2018-05-04 RX ORDER — IPRATROPIUM/ALBUTEROL SULFATE 18-103MCG
3 AEROSOL WITH ADAPTER (GRAM) INHALATION EVERY 6 HOURS
Qty: 0 | Refills: 0 | Status: DISCONTINUED | OUTPATIENT
Start: 2018-05-04 | End: 2018-05-08

## 2018-05-04 RX ORDER — HYDRALAZINE HCL 50 MG
10 TABLET ORAL ONCE
Qty: 0 | Refills: 0 | Status: COMPLETED | OUTPATIENT
Start: 2018-05-04 | End: 2018-05-04

## 2018-05-04 RX ORDER — LEVETIRACETAM 250 MG/1
500 TABLET, FILM COATED ORAL ONCE
Qty: 0 | Refills: 0 | Status: COMPLETED | OUTPATIENT
Start: 2018-05-04 | End: 2018-05-04

## 2018-05-04 RX ORDER — LEVETIRACETAM 250 MG/1
500 TABLET, FILM COATED ORAL ONCE
Qty: 0 | Refills: 0 | Status: DISCONTINUED | OUTPATIENT
Start: 2018-05-04 | End: 2018-05-04

## 2018-05-04 RX ADMIN — Medication 6 MILLIGRAM(S): at 21:42

## 2018-05-04 RX ADMIN — Medication 1 PATCH: at 12:34

## 2018-05-04 RX ADMIN — Medication 3 MILLILITER(S): at 11:28

## 2018-05-04 RX ADMIN — Medication 650 MILLIGRAM(S): at 10:00

## 2018-05-04 RX ADMIN — LEVETIRACETAM 420 MILLIGRAM(S): 250 TABLET, FILM COATED ORAL at 17:07

## 2018-05-04 RX ADMIN — MORPHINE SULFATE 2 MILLIGRAM(S): 50 CAPSULE, EXTENDED RELEASE ORAL at 17:07

## 2018-05-04 RX ADMIN — Medication 10 MILLIGRAM(S): at 05:42

## 2018-05-04 RX ADMIN — Medication 2 MILLIGRAM(S): at 18:00

## 2018-05-04 RX ADMIN — Medication 3 MILLILITER(S): at 18:03

## 2018-05-04 RX ADMIN — MORPHINE SULFATE 2 MILLIGRAM(S): 50 CAPSULE, EXTENDED RELEASE ORAL at 17:20

## 2018-05-04 RX ADMIN — LEVETIRACETAM 420 MILLIGRAM(S): 250 TABLET, FILM COATED ORAL at 05:41

## 2018-05-04 RX ADMIN — PANTOPRAZOLE SODIUM 40 MILLIGRAM(S): 20 TABLET, DELAYED RELEASE ORAL at 12:34

## 2018-05-04 RX ADMIN — Medication 2 MILLIGRAM(S): at 17:45

## 2018-05-04 RX ADMIN — LEVETIRACETAM 420 MILLIGRAM(S): 250 TABLET, FILM COATED ORAL at 18:18

## 2018-05-04 RX ADMIN — Medication 6 MILLIGRAM(S): at 05:42

## 2018-05-04 RX ADMIN — Medication 6 MILLIGRAM(S): at 13:49

## 2018-05-04 NOTE — DIETITIAN INITIAL EVALUATION ADULT. - PERTINENT LABORATORY DATA
05-03 Na138 mmol/L Glu 96 mg/dL K+ 3.1 mmol/L<L> Cr  0.76 mg/dL BUN 14 mg/dL Phos n/a   Alb 2.2 g/dL<L> PAB n/a

## 2018-05-04 NOTE — SWALLOW BEDSIDE ASSESSMENT ADULT - SLP GENERAL OBSERVATIONS
pt seen bedside, fairly alert nonverbal and essentially noncommunicative except inconsistent phonation- suspect reflexive. pt did not follow directions, noted reduced eye contact and periods of facial twitching- RN aware of seizures. pt seen bedside, fairly alert nonverbal and essentially noncommunicative except inconsistent phonation- suspect reflexive. pt did not follow directions, noted reduced eye contact and periods of facial twitching- RN aware of seizures. pt reacted to tactile stimuli.

## 2018-05-04 NOTE — DIETITIAN INITIAL EVALUATION ADULT. - OTHER INFO
pt seen due to admitting dx of "malignant neoplasm of endometrium". pt c stage IV endometrial CA, brain metastases. inpatient hospice now, DNR. currently NPO, awaiting swallow eval. no family at bedside, unable to obtain diet hx. past wt obtained from previous chart.

## 2018-05-04 NOTE — H&P ADULT - NSHPLABSRESULTS_GEN_ALL_CORE
LABS:                        7.5    8.22  )-----------( 679      ( 03 May 2018 11:14 )             26.1     05-03    138  |  99  |  14  ----------------------------<  96  3.1<L>   |  21<L>  |  0.76    Ca    9.1      03 May 2018 11:14    TPro  8.0  /  Alb  2.2<L>  /  TBili  0.4  /  DBili  x   /  AST  20  /  ALT  <6<L>  /  AlkPhos  65  05-03    PT/INR - ( 03 May 2018 11:14 )   PT: 14.7 sec;   INR: 1.34 ratio         PTT - ( 03 May 2018 11:14 )  PTT:29.8 sec

## 2018-05-04 NOTE — DIETITIAN INITIAL EVALUATION ADULT. - ENERGY NEEDS
Height (cm): 147.32 (05-03)  Weight (kg): 37.4 (05-03)  BMI (kg/m2): 17.2 (05-03)  Ideal Body Weight: 44 kg +/- 10%  % Ideal Body Weight: 85%

## 2018-05-04 NOTE — SWALLOW BEDSIDE ASSESSMENT ADULT - SWALLOW EVAL: DIAGNOSIS
pt presented with oropharyngeal dysphagia marked by reduced cognition, reduced labial seal to the spoon/oral closure, decreased AP transport/oral holding- pt did not move bolus posterior into the hypopharynx and SLP cleared bolus from oral cavity. pt not safe for po diet at this time.

## 2018-05-04 NOTE — SWALLOW BEDSIDE ASSESSMENT ADULT - COMMENTS
CT head 5/3/2018 IMPRESSION:  1)  there is a a small lesion in the left temporal lobe, which measures approximately 1.9 cm. There is low-level surrounding edema, suggesting vasogenic edema. The lesion appears to be heterogeneous with areas of hemorrhage and necrosis and/or cystic change. A workup is recommended for possible hemorrhagic metastatic lesion. MR imaging with and without gadolinium may be considered. This assumes no MR contraindications..2)  chronic ischemic changes are noted in both hemispheres with volume loss.    CXR 5/3/2018 IMPRESSION: There is no focal consolidation or pleural effusion. Heart size within normal limits. The osseous structures are intact.

## 2018-05-04 NOTE — CHART NOTE - NSCHARTNOTEFT_GEN_A_CORE
Hospitalist Medicine NP    Requested by RN to evaluate the pt.   Pt seen and examined, noted to have seizures. for more than 10 mins. Pt admitted to hospice with  end  stage  uterine  Ca.     a/p   - stat ativan 2 mg X 2 (pt was still seizing after receiving total of 4 mg IV ativan)   - Stat valium 10 mg IV now  - Stat additional keppra 500mg IV now   - increase the keppra to 1 gm IV Q 12  - keppra labs in am.   - d/w Dr. guerra.

## 2018-05-04 NOTE — CHART NOTE - NSCHARTNOTEFT_GEN_A_CORE
Upon Nutritional Assessment by the Registered Dietitian your patient was determined to meet criteria / has evidence of the following diagnosis/diagnoses:          [ ]  Mild Protein Calorie Malnutrition        [ ]  Moderate Protein Calorie Malnutrition        [x ] Severe Protein Calorie Malnutrition        [ ] Unspecified Protein Calorie Malnutrition        [x ] Underweight / BMI <19        [ ] Morbid Obesity / BMI > 40      Findings as based on:  •  Comprehensive nutrition assessment and consultation  •  Calorie counts (nutrient intake analysis)  •  Food acceptance and intake status from observations by staff  •  Follow up  •  Patient education  •  Intervention secondary to interdisciplinary rounds  •   concerns      Treatment:    The following diet has been recommended:  as per SLP recommendations; awaiting swallow eval    PROVIDER Section:     By signing this assessment you are acknowledging and agree with the diagnosis/diagnoses assigned by the Registered Dietitian    Comments:

## 2018-05-04 NOTE — H&P ADULT - NSHPPHYSICALEXAM_GEN_ALL_CORE
PHYSICAL EXAM:    GENERAL: NAD,  cachectic  HEAD:  Atraumatic, Normocephalic  EYES:   conj  clear  sclerae  anicteric  NERVOUS SYSTEM:  somnolent  arouseable  moves  all  exr  (  medicated  with  MS)  CHEST/LUNG: Clear  bilaterally; No rales, rhonchi, wheezing, or rubs  HEART: Regular rate and rhythm; No murmurs, rubs,  ABDOMEN: Soft, Nontender, Nondistended; no  masses Bowel sounds present  EXTREMITIES:  , No edema  LYMPH: No lymphadenopathy noted   RECTAL: deferred  SKIN: No rashes or lesions

## 2018-05-04 NOTE — DIETITIAN INITIAL EVALUATION ADULT. - NS AS NUTRI INTERV MEALS SNACK
General/healthful diet/Regular diet without any therapeutic restrictions c texture as recommended by SLP

## 2018-05-04 NOTE — SWALLOW BEDSIDE ASSESSMENT ADULT - SLP PERTINENT HISTORY OF CURRENT PROBLEM
dx of "malignant neoplasm of endometrium". pt c stage IV endometrial CA, brain metastases. inpatient hospice now, DNR.  73 y o female PMH   A fib, HTN, asthma

## 2018-05-04 NOTE — DIETITIAN INITIAL EVALUATION ADULT. - PHYSICAL APPEARANCE
emaciated/BMI 17.2; no edema noted; Nutrition focused physical exam conducted; Subcutaneous fat loss: [mild] Orbital fat pads region, [moderate ]Buccal fat region, [severe ]Triceps region,  [severe ]Ribs region.  Muscle wasting: [moderate ]Temples region, [severe ]Clavicle region, [severe ]Shoulder region, [severe ]Scapula region, [ severe ]Interosseous region,  [severe ]thigh region, [severe ]Calf region

## 2018-05-05 RX ADMIN — LEVETIRACETAM 400 MILLIGRAM(S): 250 TABLET, FILM COATED ORAL at 05:11

## 2018-05-05 RX ADMIN — LEVETIRACETAM 400 MILLIGRAM(S): 250 TABLET, FILM COATED ORAL at 17:03

## 2018-05-05 RX ADMIN — Medication 6 MILLIGRAM(S): at 21:57

## 2018-05-05 RX ADMIN — Medication 6 MILLIGRAM(S): at 14:48

## 2018-05-05 RX ADMIN — Medication 6 MILLIGRAM(S): at 05:11

## 2018-05-05 RX ADMIN — PANTOPRAZOLE SODIUM 40 MILLIGRAM(S): 20 TABLET, DELAYED RELEASE ORAL at 11:57

## 2018-05-05 NOTE — PROGRESS NOTE ADULT - SUBJECTIVE AND OBJECTIVE BOX
INTERVAL HPI/OVERNIGHT EVENTS:    had  saizures 5/4/18  resolved  with  Ativan  valium    had  keppra  increased      REVIEW OF SYSTEMS:  CONSTITUTIONAL:      alert  comfortabe  uncommunicative  MEDICATION:  acetaminophen  Suppository 650 milliGRAM(s) Rectal every 6 hours PRN  ALBUTerol    90 MICROgram(s) HFA Inhaler 1 Puff(s) Inhalation every 4 hours PRN  ALBUTerol/ipratropium for Nebulization 3 milliLiter(s) Nebulizer every 6 hours PRN  bisacodyl Suppository 10 milliGRAM(s) Rectal daily PRN  cloNIDine Patch 0.1 mG/24Hr(s) 1 patch Topical every 7 days  dexamethasone  Injectable 6 milliGRAM(s) IV Push every 8 hours  dextrose 5% + sodium chloride 0.9%. 1000 milliLiter(s) IV Continuous <Continuous>  levETIRAcetam  IVPB 1000 milliGRAM(s) IV Intermittent every 12 hours  LORazepam   Injectable 2 milliGRAM(s) IV Push every 4 hours PRN  morphine  - Injectable 2 milliGRAM(s) IV Push every 2 hours PRN  morphine  - Injectable 4 milliGRAM(s) IV Push every 2 hours PRN  pantoprazole  Injectable 40 milliGRAM(s) IV Push daily    Vital Signs Last 24 Hrs  T(C): 35.8 (05 May 2018 05:43), Max: 38 (04 May 2018 11:03)  T(F): 96.4 (05 May 2018 05:43), Max: 100.4 (04 May 2018 11:03)  HR: 64 (05 May 2018 05:43) (58 - 148)  BP: 140/54 (05 May 2018 05:43) (114/66 - 165/72)  BP(mean): --  RR: 16 (05 May 2018 05:43) (16 - 20)  SpO2: 98% (05 May 2018 05:43) (95% - 100%)    PHYSICAL EXAM:  GENERAL: thin  in  NAD  EYES:  conjunctiva and sclera clear  NERVOUS SYSTEM:  Alert moves  all  extr  CHEST/LUNG: Clear    HEART: Regular rate and rhythm; No murmurs, rubs, or gallops  ABDOMEN: Soft, Nontender, Nondistended; Bowel sounds present  EXTREMITIES:  no  edema no  tenderness  SKIN: No rashes   LABS:                        7.5    8.22  )-----------( 679      ( 03 May 2018 11:14 )             26.1     05-03    138  |  99  |  14  ----------------------------<  96  3.1<L>   |  21<L>  |  0.76    Ca    9.1      03 May 2018 11:14    TPro  8.0  /  Alb  2.2<L>  /  TBili  0.4  /  DBili  x   /  AST  20  /  ALT  <6<L>  /  AlkPhos  65  05-03    PT/INR - ( 03 May 2018 11:14 )   PT: 14.7 sec;   INR: 1.34 ratio         PTT - ( 03 May 2018 11:14 )  PTT:29.8 sec    CAPILLARY BLOOD GLUCOSE          RADIOLOGY & ADDITIONAL TESTS:    Imaging reports  Personally Reviewed:  [ x] YES  [ ] NO    Consultant(s) Notes Reviewed:  [ x] YES  [ ] NO    Care Discussed with Consultants/Other Providers [x ] YES  [ ] NO  Assessment and Plan:   Problem/Plan - 1:  ·  Problem: Endometrial cancer.  Plan: comfort  care  MS  ativan  seizure  prophylaxis  with  keppra.     Problem/Plan - 2:  ·  Problem: Asthma.  Plan: O2  duoneb  prn.     Problem/Plan - 3:  ·  Problem: HTN (hypertension).  Plan: clonidine  patch.     Problem/Plan - 4:  ·  Problem: Atrial fibrillation, unspecified type.

## 2018-05-06 RX ADMIN — Medication 2 MILLIGRAM(S): at 07:54

## 2018-05-06 RX ADMIN — Medication 6 MILLIGRAM(S): at 22:01

## 2018-05-06 RX ADMIN — LEVETIRACETAM 400 MILLIGRAM(S): 250 TABLET, FILM COATED ORAL at 05:13

## 2018-05-06 RX ADMIN — PANTOPRAZOLE SODIUM 40 MILLIGRAM(S): 20 TABLET, DELAYED RELEASE ORAL at 11:29

## 2018-05-06 RX ADMIN — SODIUM CHLORIDE 30 MILLILITER(S): 9 INJECTION, SOLUTION INTRAVENOUS at 11:04

## 2018-05-06 RX ADMIN — Medication 6 MILLIGRAM(S): at 05:13

## 2018-05-06 RX ADMIN — Medication 6 MILLIGRAM(S): at 15:02

## 2018-05-06 RX ADMIN — LEVETIRACETAM 400 MILLIGRAM(S): 250 TABLET, FILM COATED ORAL at 17:04

## 2018-05-06 NOTE — PROGRESS NOTE ADULT - SUBJECTIVE AND OBJECTIVE BOX
INTERVAL HPI/OVERNIGHT EVENTS:        REVIEW OF SYSTEMS:  CONSTITUTIONAL:  alert  comfortable  uncommunicative      MEDICATION:  acetaminophen  Suppository 650 milliGRAM(s) Rectal every 6 hours PRN  ALBUTerol    90 MICROgram(s) HFA Inhaler 1 Puff(s) Inhalation every 4 hours PRN  ALBUTerol/ipratropium for Nebulization 3 milliLiter(s) Nebulizer every 6 hours PRN  bisacodyl Suppository 10 milliGRAM(s) Rectal daily PRN  cloNIDine Patch 0.1 mG/24Hr(s) 1 patch Topical every 7 days  dexamethasone  Injectable 6 milliGRAM(s) IV Push every 8 hours  dextrose 5% + sodium chloride 0.9%. 1000 milliLiter(s) IV Continuous <Continuous>  levETIRAcetam  IVPB 1000 milliGRAM(s) IV Intermittent every 12 hours  LORazepam   Injectable 2 milliGRAM(s) IV Push every 4 hours PRN  morphine  - Injectable 2 milliGRAM(s) IV Push every 2 hours PRN  morphine  - Injectable 4 milliGRAM(s) IV Push every 2 hours PRN  pantoprazole  Injectable 40 milliGRAM(s) IV Push daily    Vital Signs Last 24 Hrs  T(C): 36.2 (06 May 2018 05:28), Max: 36.7 (05 May 2018 16:22)  T(F): 97.2 (06 May 2018 05:28), Max: 98.1 (05 May 2018 23:48)  HR: 64 (06 May 2018 05:28) (55 - 66)  BP: 145/62 (06 May 2018 05:28) (124/52 - 145/62)  BP(mean): --  RR: 16 (06 May 2018 05:28) (16 - 18)  SpO2: 100% (06 May 2018 05:28) (99% - 100%)    PHYSICAL EXAM:  GENERAL: NAD, well-groomed, well-developed  EYES:  conjunctiva and sclera clear  ENMT:  Moist mucous membranes,   NECK: Supple, No JVD, Normal thyroid  NERVOUS SYSTEM:  Alert oriented   no  focal  deficits;   CHEST/LUNG: Clear    HEART: Regular rate and rhythm; No murmurs, rubs, or gallops  ABDOMEN: Soft, Nontender, Nondistended; Bowel sounds present  EXTREMITIES:  no  edema no  tenderness  SKIN: No rashes   LABS:              CAPILLARY BLOOD GLUCOSE          RADIOLOGY & ADDITIONAL TESTS:    Imaging reports  Personally Reviewed:  [x ] YES  [ ] NO    Consultant(s) Notes Reviewed:  [x ] YES  [ ] NO    Care Discussed with Consultants/Other Providers [ x] YES  [ ] NO  Assessment and Plan:   Problem/Plan - 1:  ·  Problem: Endometrial cancer.  Plan: comfort  care  MS  ativan  seizure  prophylaxis  with  keppra.     Problem/Plan - 2:  ·  Problem: Asthma.  Plan: O2  duoneb  prn.     Problem/Plan - 3:  ·  Problem: HTN (hypertension).  Plan: clonidine  patch.     Problem/Plan - 4:  ·  Problem: Atrial fibrillation, unspecified type.   needs  midline  placement

## 2018-05-07 RX ADMIN — PANTOPRAZOLE SODIUM 40 MILLIGRAM(S): 20 TABLET, DELAYED RELEASE ORAL at 11:13

## 2018-05-07 RX ADMIN — Medication 6 MILLIGRAM(S): at 15:03

## 2018-05-07 RX ADMIN — SODIUM CHLORIDE 30 MILLILITER(S): 9 INJECTION, SOLUTION INTRAVENOUS at 20:50

## 2018-05-07 RX ADMIN — Medication 6 MILLIGRAM(S): at 06:17

## 2018-05-07 RX ADMIN — Medication 6 MILLIGRAM(S): at 21:11

## 2018-05-07 RX ADMIN — LEVETIRACETAM 400 MILLIGRAM(S): 250 TABLET, FILM COATED ORAL at 17:21

## 2018-05-07 RX ADMIN — LEVETIRACETAM 400 MILLIGRAM(S): 250 TABLET, FILM COATED ORAL at 04:06

## 2018-05-07 NOTE — CHART NOTE - NSCHARTNOTEFT_GEN_A_CORE
Assessment: Pt currently lethargic. Pt presents with endometrial cancer receiving comfort care.    Factors impacting intake: [ ] none [ ] nausea  [ ] vomiting [ ] diarrhea [ ] constipation  [ ]chewing problems [ ] swallowing issues  [ ] other:     Diet Prescription: Diet, NPO (05-03-18 @ 18:56)    Intake:  Pt NPO    Current Weight: Weight (kg): 38.9 (05-05 @ 11:00), 37.4 (05-03 @ 15:45), Wt=37.5kg(5/4); no edema  % Weight Change 1.4kg wt gain x 1 day    Pertinent Medications: MEDICATIONS  (STANDING):  cloNIDine Patch 0.1 mG/24Hr(s) 1 patch Topical every 7 days  dexamethasone  Injectable 6 milliGRAM(s) IV Push every 8 hours  dextrose 5% + sodium chloride 0.9%. 1000 milliLiter(s) (30 mL/Hr) IV Continuous <Continuous>  levETIRAcetam  IVPB 1000 milliGRAM(s) IV Intermittent every 12 hours  pantoprazole  Injectable 40 milliGRAM(s) IV Push daily    MEDICATIONS  (PRN):  acetaminophen  Suppository 650 milliGRAM(s) Rectal every 6 hours PRN For Temp greater than 38 C (100.4 F)  ALBUTerol    90 MICROgram(s) HFA Inhaler 1 Puff(s) Inhalation every 4 hours PRN Bronchospasm  ALBUTerol/ipratropium for Nebulization 3 milliLiter(s) Nebulizer every 6 hours PRN Shortness of Breath and/or Wheezing  bisacodyl Suppository 10 milliGRAM(s) Rectal daily PRN Constipation  LORazepam   Injectable 2 milliGRAM(s) IV Push every 4 hours PRN seizures  morphine  - Injectable 2 milliGRAM(s) IV Push every 2 hours PRN Moderate Pain (4 - 6)  morphine  - Injectable 4 milliGRAM(s) IV Push every 2 hours PRN Severe Pain (7 - 10)    Pertinent Labs:  05-03 Alb 2.2 g/dL<L>, H/H=7.5/26.1(5/3), Cv=757, K=3.8, BUN/Cr=14/0.76(5/3)     CAPILLARY BLOOD GLUCOSE        Skin:  Skin intact    Estimated Needs:   [x ] no change since previous assessment  [ ] recalculated:     Previous Nutrition Diagnosis:   [ ] Inadequate Energy Intake [ ]Inadequate Oral Intake [ ] Excessive Energy Intake   [ ] Underweight [ ] Increased Nutrient Needs [ ] Overweight/Obesity   [ ] Altered GI Function [ ] Unintended Weight Loss [ ] Food & Nutrition Related Knowledge Deficit [ ] severe Malnutrition [ ] moderate malnutrition    Nutrition Diagnosis is [ ] ongoing  [ ] resolved  [ ] improved  [ ] not applicable     New Nutrition Diagnosis: [ ] not applicable       Interventions:   Recommend  [ ] Continue:  [ ] Change Diet To:  [ ] Nutrition Supplement:  [ ] Nutrition Support:  [ ] Other:     Monitoring and Evaluation:   [ ] PO intake [ x ] Tolerance to diet prescription [ x ] weights [ x ] labs[ x ] follow up per protocol  [ ] other: Assessment: Pt currently lethargic. Pt presents with endometrial cancer receiving comfort care.    Factors impacting intake: [ ] none [ ] nausea  [ ] vomiting [ ] diarrhea [ ] constipation  [ ]chewing problems [x ] swallowing issues  [ ] other:     Diet Prescription: Diet, NPO (05-03-18 @ 18:56)    Intake:  Pt NPO x 4 days    Current Weight: Weight (kg): 38.9 (05-05 @ 11:00), 37.4 (05-03 @ 15:45), Wt=37.5kg(5/4); no edema  % Weight Change 1.4kg wt gain x 1 day    Pertinent Medications: MEDICATIONS  (STANDING):  cloNIDine Patch 0.1 mG/24Hr(s) 1 patch Topical every 7 days  dexamethasone  Injectable 6 milliGRAM(s) IV Push every 8 hours  dextrose 5% + sodium chloride 0.9%. 1000 milliLiter(s) (30 mL/Hr) IV Continuous <Continuous>  levETIRAcetam  IVPB 1000 milliGRAM(s) IV Intermittent every 12 hours  pantoprazole  Injectable 40 milliGRAM(s) IV Push daily    MEDICATIONS  (PRN):  acetaminophen  Suppository 650 milliGRAM(s) Rectal every 6 hours PRN For Temp greater than 38 C (100.4 F)  ALBUTerol    90 MICROgram(s) HFA Inhaler 1 Puff(s) Inhalation every 4 hours PRN Bronchospasm  ALBUTerol/ipratropium for Nebulization 3 milliLiter(s) Nebulizer every 6 hours PRN Shortness of Breath and/or Wheezing  bisacodyl Suppository 10 milliGRAM(s) Rectal daily PRN Constipation  LORazepam   Injectable 2 milliGRAM(s) IV Push every 4 hours PRN seizures  morphine  - Injectable 2 milliGRAM(s) IV Push every 2 hours PRN Moderate Pain (4 - 6)  morphine  - Injectable 4 milliGRAM(s) IV Push every 2 hours PRN Severe Pain (7 - 10)    Pertinent Labs:  05-03 Alb 2.2 g/dL<L>, H/H=7.5/26.1(5/3), Of=652, K=3.8, BUN/Cr=14/0.76(5/3)     CAPILLARY BLOOD GLUCOSE        Skin:  Skin intact    Estimated Needs:   [x ] no change since previous assessment  [ ] recalculated:     Previous Nutrition Diagnosis:   [ ] Inadequate Energy Intake [ ]Inadequate Oral Intake [ ] Excessive Energy Intake   [ ] Underweight [ ] Increased Nutrient Needs [ ] Overweight/Obesity   [ ] Altered GI Function [ ] Unintended Weight Loss [ ] Food & Nutrition Related Knowledge Deficit [x ] Chronic severe Malnutrition [ ] moderate malnutrition    Nutrition Diagnosis is [x ] ongoing  [ ] resolved  [ ] improved  [ ] not applicable     New Nutrition Diagnosis: [x ] not applicable       Interventions:   Recommend  [x ] Continue: NPO & Comfort care  [ ] Change Diet To:  [ ] Nutrition Supplement:  [ ] Nutrition Support:  [ ] Other:     Monitoring and Evaluation:   [ ] PO intake [ x ] Tolerance to diet prescription [ x ] weights [ x ] labs[ x ] follow up per protocol  [ ] other: Assessment: Pt currently lethargic. Pt presents with endometrial cancer receiving comfort care. Swallow evaluation 5/4 recommends NPO due to oropharyngeal dysphagia    Factors impacting intake: [ ] none [ ] nausea  [ ] vomiting [ ] diarrhea [ ] constipation  [ ]chewing problems [x ] swallowing issues  [ ] other:     Diet Prescription: Diet, NPO (05-03-18 @ 18:56)    Intake:  Pt NPO x 4 days    Current Weight: Weight (kg): 38.9 (05-05 @ 11:00), 37.4 (05-03 @ 15:45), Wt=37.5kg(5/4); no edema  % Weight Change 1.4kg wt gain x 1 day    Pertinent Medications: MEDICATIONS  (STANDING):  cloNIDine Patch 0.1 mG/24Hr(s) 1 patch Topical every 7 days  dexamethasone  Injectable 6 milliGRAM(s) IV Push every 8 hours  dextrose 5% + sodium chloride 0.9%. 1000 milliLiter(s) (30 mL/Hr) IV Continuous <Continuous>  levETIRAcetam  IVPB 1000 milliGRAM(s) IV Intermittent every 12 hours  pantoprazole  Injectable 40 milliGRAM(s) IV Push daily    MEDICATIONS  (PRN):  acetaminophen  Suppository 650 milliGRAM(s) Rectal every 6 hours PRN For Temp greater than 38 C (100.4 F)  ALBUTerol    90 MICROgram(s) HFA Inhaler 1 Puff(s) Inhalation every 4 hours PRN Bronchospasm  ALBUTerol/ipratropium for Nebulization 3 milliLiter(s) Nebulizer every 6 hours PRN Shortness of Breath and/or Wheezing  bisacodyl Suppository 10 milliGRAM(s) Rectal daily PRN Constipation  LORazepam   Injectable 2 milliGRAM(s) IV Push every 4 hours PRN seizures  morphine  - Injectable 2 milliGRAM(s) IV Push every 2 hours PRN Moderate Pain (4 - 6)  morphine  - Injectable 4 milliGRAM(s) IV Push every 2 hours PRN Severe Pain (7 - 10)    Pertinent Labs:  05-03 Alb 2.2 g/dL<L>, H/H=7.5/26.1(5/3), Wb=619, K=3.8, BUN/Cr=14/0.76(5/3)     CAPILLARY BLOOD GLUCOSE        Skin:  Skin intact    Estimated Needs:   [x ] no change since previous assessment  [ ] recalculated:     Previous Nutrition Diagnosis:   [ ] Inadequate Energy Intake [ ]Inadequate Oral Intake [ ] Excessive Energy Intake   [ ] Underweight [ ] Increased Nutrient Needs [ ] Overweight/Obesity   [ ] Altered GI Function [ ] Unintended Weight Loss [ ] Food & Nutrition Related Knowledge Deficit [x ] Chronic severe Malnutrition [ ] moderate malnutrition    Nutrition Diagnosis is [x ] ongoing  [ ] resolved  [ ] improved  [ ] not applicable     New Nutrition Diagnosis: [x ] not applicable       Interventions:   Recommend  [x ] Continue: NPO & Comfort care  [ ] Change Diet To:  [ ] Nutrition Supplement:  [ ] Nutrition Support:  [ ] Other:     Monitoring and Evaluation:   [ ] PO intake [ x ] Tolerance to diet prescription [ x ] weights [ x ] labs[ x ] follow up per protocol  [ ] other:

## 2018-05-07 NOTE — PROGRESS NOTE ADULT - SUBJECTIVE AND OBJECTIVE BOX
INTERVAL HPI/OVERNIGHT EVENTS:    midline  inseted      REVIEW OF SYSTEMS:  CONSTITUTIONAL:  lethargic  poorly  arouseable    MEDICATION:  acetaminophen  Suppository 650 milliGRAM(s) Rectal every 6 hours PRN  ALBUTerol    90 MICROgram(s) HFA Inhaler 1 Puff(s) Inhalation every 4 hours PRN  ALBUTerol/ipratropium for Nebulization 3 milliLiter(s) Nebulizer every 6 hours PRN  bisacodyl Suppository 10 milliGRAM(s) Rectal daily PRN  cloNIDine Patch 0.1 mG/24Hr(s) 1 patch Topical every 7 days  dexamethasone  Injectable 6 milliGRAM(s) IV Push every 8 hours  dextrose 5% + sodium chloride 0.9%. 1000 milliLiter(s) IV Continuous <Continuous>  levETIRAcetam  IVPB 1000 milliGRAM(s) IV Intermittent every 12 hours  LORazepam   Injectable 2 milliGRAM(s) IV Push every 4 hours PRN  morphine  - Injectable 2 milliGRAM(s) IV Push every 2 hours PRN  morphine  - Injectable 4 milliGRAM(s) IV Push every 2 hours PRN  pantoprazole  Injectable 40 milliGRAM(s) IV Push daily    Vital Signs Last 24 Hrs  T(C): 36.4 (07 May 2018 04:56), Max: 36.5 (07 May 2018 00:18)  T(F): 97.5 (07 May 2018 04:56), Max: 97.7 (07 May 2018 00:18)  HR: 107 (07 May 2018 04:56) (75 - 107)  BP: 183/81 (07 May 2018 04:56) (151/67 - 183/81)  BP(mean): --  RR: 19 (07 May 2018 04:56) (16 - 19)  SpO2: 93% (07 May 2018 04:56) (93% - 100%)    PHYSICAL EXAM:  GENERAL: comfortable  EYES:  conjunctiva and sclera clear  NERVOUS SYSTEM:  lethargic  poorly arouseable  CHEST/LUNG: Clear    HEART: Regular rate and rhythm; No murmurs, rubs, or gallops    LABS:              CAPILLARY BLOOD GLUCOSE          RADIOLOGY & ADDITIONAL TESTS:    Assessment and Plan:   Problem/Plan - 1:  ·  Problem: Endometrial cancer.  Plan: comfort  care  MS  ativan  seizure  prophylaxis  with  keppra.     Problem/Plan - 2:  ·  Problem: Asthma.  Plan: O2  duoneb  prn.     Problem/Plan - 3:  ·  Problem: HTN (hypertension).  Plan: clonidine  patch.     Problem/Plan - 4:  ·  Problem: Atrial fibrillation, unspecified type. rate  controlled  continue  comfort  care  discussed with  darren

## 2018-05-08 ENCOUNTER — TRANSCRIPTION ENCOUNTER (OUTPATIENT)
Age: 73
End: 2018-05-08

## 2018-05-08 VITALS
RESPIRATION RATE: 16 BRPM | SYSTOLIC BLOOD PRESSURE: 127 MMHG | TEMPERATURE: 99 F | DIASTOLIC BLOOD PRESSURE: 58 MMHG | HEART RATE: 68 BPM | OXYGEN SATURATION: 100 %

## 2018-05-08 RX ORDER — MORPHINE SULFATE 50 MG/1
2 CAPSULE, EXTENDED RELEASE ORAL
Qty: 0 | Refills: 0 | COMMUNITY

## 2018-05-08 RX ORDER — IPRATROPIUM/ALBUTEROL SULFATE 18-103MCG
3 AEROSOL WITH ADAPTER (GRAM) INHALATION
Qty: 0 | Refills: 0 | COMMUNITY
Start: 2018-05-08

## 2018-05-08 RX ORDER — LEVETIRACETAM 250 MG/1
10 TABLET, FILM COATED ORAL
Qty: 0 | Refills: 0 | COMMUNITY
Start: 2018-05-08

## 2018-05-08 RX ORDER — DEXAMETHASONE 0.5 MG/5ML
2 ELIXIR ORAL
Qty: 0 | Refills: 0 | COMMUNITY

## 2018-05-08 RX ORDER — ACETAMINOPHEN 500 MG
1 TABLET ORAL
Qty: 0 | Refills: 0 | COMMUNITY
Start: 2018-05-08

## 2018-05-08 RX ORDER — PANTOPRAZOLE SODIUM 20 MG/1
40 TABLET, DELAYED RELEASE ORAL
Qty: 0 | Refills: 0 | COMMUNITY
Start: 2018-05-08

## 2018-05-08 RX ADMIN — Medication 6 MILLIGRAM(S): at 05:25

## 2018-05-08 RX ADMIN — Medication 2 MILLIGRAM(S): at 10:21

## 2018-05-08 RX ADMIN — PANTOPRAZOLE SODIUM 40 MILLIGRAM(S): 20 TABLET, DELAYED RELEASE ORAL at 11:17

## 2018-05-08 RX ADMIN — LEVETIRACETAM 400 MILLIGRAM(S): 250 TABLET, FILM COATED ORAL at 05:25

## 2018-05-08 NOTE — PROGRESS NOTE ADULT - SUBJECTIVE AND OBJECTIVE BOX
INTERVAL HPI/OVERNIGHT EVENTS:        REVIEW OF SYSTEMS:  CONSTITUTIONAL:  lethargic  poorly  arouseable      MEDICATION:  acetaminophen  Suppository 650 milliGRAM(s) Rectal every 6 hours PRN  ALBUTerol    90 MICROgram(s) HFA Inhaler 1 Puff(s) Inhalation every 4 hours PRN  ALBUTerol/ipratropium for Nebulization 3 milliLiter(s) Nebulizer every 6 hours PRN  bisacodyl Suppository 10 milliGRAM(s) Rectal daily PRN  cloNIDine Patch 0.1 mG/24Hr(s) 1 patch Topical every 7 days  dexamethasone  Injectable 6 milliGRAM(s) IV Push every 8 hours  dextrose 5% + sodium chloride 0.9%. 1000 milliLiter(s) IV Continuous <Continuous>  levETIRAcetam  IVPB 1000 milliGRAM(s) IV Intermittent every 12 hours  LORazepam   Injectable 2 milliGRAM(s) IV Push every 4 hours PRN  morphine  - Injectable 2 milliGRAM(s) IV Push every 2 hours PRN  morphine  - Injectable 4 milliGRAM(s) IV Push every 2 hours PRN  pantoprazole  Injectable 40 milliGRAM(s) IV Push daily    Vital Signs Last 24 Hrs  T(C): 36.6 (08 May 2018 05:50), Max: 37.2 (07 May 2018 11:36)  T(F): 97.8 (08 May 2018 05:50), Max: 99 (07 May 2018 11:36)  HR: 78 (08 May 2018 05:50) (60 - 78)  BP: 163/70 (08 May 2018 05:50) (132/58 - 181/64)  BP(mean): --  RR: 16 (08 May 2018 05:50) (16 - 16)  SpO2: 100% (08 May 2018 05:50) (98% - 100%)    PHYSICAL EXAM:  GENERAL: NAD,   EYES:  conjunctiva and sclera clear  NERVOUS SYSTEM: lethargic  poorly  arouseable  CHEST/LUNG: Clear    HEART: Regular rate and rhythm; No murmurs, rubs, or gallops  ABDOMEN: Soft, Nontender, Nondistended; Bowel sounds present  EXTREMITIES:  no  edema no  tenderness  SKIN: No rashes   LABS:              CAPILLARY BLOOD GLUCOSE          RADIOLOGY & ADDITIONAL TESTS:  ssessment and Plan:   Problem/Plan - 1:  ·  Problem: Endometrial cancer.  Plan: comfort  care  MS  ativan  seizure  prophylaxis  with  keppra.     Problem/Plan - 2:  ·  Problem: Asthma.  Plan: O2  duoneb  prn.     Problem/Plan - 3:  ·  Problem: HTN (hypertension).  Plan: clonidine  patch.     Problem/Plan - 4:  ·  Problem: Atrial fibrillation, unspecified type. rate  controlled  continue  comfort  care

## 2018-05-08 NOTE — DISCHARGE NOTE ADULT - CARE PLAN
Principal Discharge DX:	Endometrial cancer  Goal:	That the pt will remain comfortable and be able to receive AED for seizure prevention  Assessment and plan of treatment:	Continue IV Keppra  Continue IV Decadron and protonix for GI prophy  Ativan 2 mg IV stat PRN seizure activity  seizure precautions  Secondary Diagnosis:	Brain metastases  Secondary Diagnosis:	Seizure

## 2018-05-08 NOTE — GOALS OF CARE CONVERSATION - PERSONAL ADVANCE DIRECTIVE - CONVERSATION DETAILS
Pt will transfer to the [facility] this afternoon for continuation of sx management. Ambulance transport has been arranged for   1:30 PM . Pt's grand dtr Crystal is in agreement. Primary nurse Lora made aware of transfer.       Hospice RN    Anjelica De La Cruz RN

## 2018-05-08 NOTE — DISCHARGE NOTE ADULT - PATIENT PORTAL LINK FT
You can access the [facility] Patient Portal, offered by [facility], by registering with the following website: [email or web address]

## 2018-05-08 NOTE — DISCHARGE NOTE ADULT - MEDICATION SUMMARY - MEDICATIONS TO STOP TAKING
I will STOP taking the medications listed below when I get home from the hospital:    oxyCODONE 10 mg oral tablet  -- 1 tab(s) by mouth every 6 hours, As Needed MDD:4 tabs / day    gabapentin 600 mg oral tablet  -- 1 tab(s) by mouth 2 times a day    metoprolol tartrate 25 mg oral tablet  -- 1 tab(s) by mouth 2 times a day

## 2018-05-08 NOTE — DISCHARGE NOTE ADULT - PLAN OF CARE
That the pt will remain comfortable and be able to receive AED for seizure prevention Continue IV Keppra  Continue IV Decadron and protonix for GI prophy  Ativan 2 mg IV stat PRN seizure activity  seizure precautions

## 2018-05-08 NOTE — DISCHARGE NOTE ADULT - MEDICATION SUMMARY - MEDICATIONS TO TAKE
I will START or STAY ON the medications listed below when I get home from the hospital:    dexamethasone 4 mg/mL injectable solution  -- 2 milligram(s) injectable every 8 hours for intactable seizures and vasogenic edema  -- Indication: For for seizures/vasogenic edema    morphine  -- 2 milligram(s) intravenous every 2 hours, As Needed pain or dyspnea  -- Indication: For for pain    acetaminophen 650 mg rectal suppository  -- 1 suppository(ies) rectally every 6 hours, As needed, For Temp greater than 38 C (100.4 F)  -- Indication: For for fever    cloNIDine 0.1 mg/24 hr transdermal film, extended release  -- 0.1 mg/24h by transdermal patch once a week  -- Indication: For for high blood pressure    levETIRAcetam 100 mg/mL intravenous solution  -- 10 milliliter(s) intravenous every 12 hours  -- Indication: For for seizure    LORazepam 2 mg/mL injectable solution  -- 2 milligram(s) injectable once, As Needed STAT for seizure  -- Indication: For for seizure    ipratropium-albuterol 0.5 mg-2.5 mg/3 mLinhalation solution  -- 3 milliliter(s) inhaled every 6 hours, As needed, Shortness of Breath and/or Wheezing  -- Indication: For for wheeZing    bisacodyl 10 mg rectal suppository  -- 1 suppository(ies) rectally once a day, As needed, Constipation  -- Indication: For for bowel regimen    pantoprazole 40 mg intravenous injection  -- 40 milligram(s) intravenous once a day  -- Indication: For for GI prophy on steroid

## 2018-05-08 NOTE — DISCHARGE NOTE ADULT - HOSPITAL COURSE
74 female on home hospice with metastatic endometrial ca brought to ER by EMS when she was found unresponsive and seizing in her home.PMH:  stage IV Endometrial CA (s/p 7 cycles of carboplatin/taxol last chemo in November 2017), AF on apixaban, HTN presents with ams.  Per EMS, neighbor called and she didn't respond and came to check on her and noted she was altered and unresponsive. On route, pt had 30 sec tonic clonic seizure with EMS, no meds given. In er, pt had partial seizure x 2, ~ q15 secs each that stopped without meds. Head CT revealed  1)  there is a a small lesion in the left temporal lobe, which measures  	approximately 1.9 cm. There is low-level surrounding edema, suggesting   	vasogenic edema. The lesion appears to be heterogeneous with areas of   	hemorrhage and necrosis and/or cystic change. A workup is recommended for   	possible hemorrhagic metastatic lesion. MR imaging with and without   	gadolinium may be considered. This assumes no MR contraindications..  	2)  chronic ischemic changes are noted in both hemispheres with volume        Pt unable to take PO.  Continued having seizures and required titration of Keppra and IV lorazepam multiple doses.  Ptnow awaiting [facility] to [facility]

## 2018-05-10 DIAGNOSIS — Z51.5 ENCOUNTER FOR PALLIATIVE CARE: ICD-10-CM

## 2018-05-10 DIAGNOSIS — J45.909 UNSPECIFIED ASTHMA, UNCOMPLICATED: ICD-10-CM

## 2018-05-10 DIAGNOSIS — G93.9 DISORDER OF BRAIN, UNSPECIFIED: ICD-10-CM

## 2018-05-10 DIAGNOSIS — G40.409 OTHER GENERALIZED EPILEPSY AND EPILEPTIC SYNDROMES, NOT INTRACTABLE, WITHOUT STATUS EPILEPTICUS: ICD-10-CM

## 2018-05-10 DIAGNOSIS — G93.6 CEREBRAL EDEMA: ICD-10-CM

## 2018-05-10 DIAGNOSIS — Z79.01 LONG TERM (CURRENT) USE OF ANTICOAGULANTS: ICD-10-CM

## 2018-05-10 DIAGNOSIS — Z92.21 PERSONAL HISTORY OF ANTINEOPLASTIC CHEMOTHERAPY: ICD-10-CM

## 2018-05-10 DIAGNOSIS — I10 ESSENTIAL (PRIMARY) HYPERTENSION: ICD-10-CM

## 2018-05-10 DIAGNOSIS — I48.91 UNSPECIFIED ATRIAL FIBRILLATION: ICD-10-CM

## 2018-05-10 DIAGNOSIS — Z66 DO NOT RESUSCITATE: ICD-10-CM

## 2018-05-10 DIAGNOSIS — C54.1 MALIGNANT NEOPLASM OF ENDOMETRIUM: ICD-10-CM

## 2018-05-14 DIAGNOSIS — Z66 DO NOT RESUSCITATE: ICD-10-CM

## 2018-05-14 DIAGNOSIS — Z51.5 ENCOUNTER FOR PALLIATIVE CARE: ICD-10-CM

## 2018-05-14 DIAGNOSIS — J45.909 UNSPECIFIED ASTHMA, UNCOMPLICATED: ICD-10-CM

## 2018-05-14 DIAGNOSIS — G93.6 CEREBRAL EDEMA: ICD-10-CM

## 2018-05-14 DIAGNOSIS — R56.9 UNSPECIFIED CONVULSIONS: ICD-10-CM

## 2018-05-14 DIAGNOSIS — E43 UNSPECIFIED SEVERE PROTEIN-CALORIE MALNUTRITION: ICD-10-CM

## 2018-05-14 DIAGNOSIS — I10 ESSENTIAL (PRIMARY) HYPERTENSION: ICD-10-CM

## 2018-05-14 DIAGNOSIS — C79.31 SECONDARY MALIGNANT NEOPLASM OF BRAIN: ICD-10-CM

## 2018-05-14 DIAGNOSIS — Z92.21 PERSONAL HISTORY OF ANTINEOPLASTIC CHEMOTHERAPY: ICD-10-CM

## 2018-05-14 DIAGNOSIS — I48.91 UNSPECIFIED ATRIAL FIBRILLATION: ICD-10-CM

## 2018-05-14 DIAGNOSIS — R41.82 ALTERED MENTAL STATUS, UNSPECIFIED: ICD-10-CM

## 2018-05-14 DIAGNOSIS — C54.1 MALIGNANT NEOPLASM OF ENDOMETRIUM: ICD-10-CM

## 2018-12-14 NOTE — ED ADULT NURSE NOTE - CAS EDP DISCH TYPE
Routing to PCP for further review/recommendations/orders.  Results have not been reviewed yet.    Addie Teran RN  [facility]     Home

## 2019-04-24 NOTE — DIETITIAN INITIAL EVALUATION ADULT. - PROBLEM/PLAN-2
[employer] interpretor [id number] used for this appointment   Med changes reviewed with pt by dr Laurita Garcia as well as all other instructions   pt directed to stop at the  to schedule her fu appt and  her printed avs DISPLAY PLAN FREE TEXT

## 2019-06-14 NOTE — ED PROVIDER NOTE - MUSCULOSKELETAL NEGATIVE STATEMENT, MLM
Pt appears to be resting comfortably, even rise and fall of chest noted. Will continue to monitor.    no back pain, no gout, no musculoskeletal pain, no neck pain, and no weakness. no back pain, no neck pain, and no weakness.

## 2019-07-22 NOTE — DISCHARGE NOTE ADULT - PLAN OF CARE
22-Jul-2019 10:48 Optimal H/H levels - multifactorial or chemotherapy induced in addition to blood loss  - UA- neg for blood, s/p 2 units of blood  - vaginal bleed: resolved, FOB- negative  - s/p 2U PRBCs, monitor cbc, repeat H/H stable  - Eliquis for afib was held, resumed on 12/8/17. Continue taking as prescribed  - ; Haptoglobin 426 elevated; Vit B12: 975 elevated - Pt spiking fever, with leukocytosis, and HR > 97 with fever, meet sepsis criteria.   - possible due to sigmoid colon abscess/contained perforation on CT   - ID consulted Dr. Choi , Surgery and IR following   - Blood and urine Cx: NTD, RVP neg   - continued Zosyn in the hospital, discharged home on Augmentin to complete 7 more days     - 12/2 s/p IR percutaneous drainage catheter placement. You have appoint with IR on 12/14/17 at 11 am for tube check. Near Syncope - was monitored on Tele, Doron -negative - Pt spiking fever, with leukocytosis, and HR > 97 with fever, meet sepsis criteria.   - possible due to sigmoid colon abscess/contained perforation on CT   - ID consulted Dr. Choi , Surgery and IR following   - Blood and urine Cx: NTD, RVP neg   - US of abdomen --> gallstones & sludge  - CT chest, abd/pelvis - revealed mural thickening of the sigmoid colon with adjacent air and fluid collection, consistent with contained perforation.   - continued Zosyn in the hospital, discharged home on Augmentin to complete 7 more days     - 12/2 s/p IR percutaneous drainage catheter placement. You have appoint with IR on 12/14/17 at 11 am for tube check. - on Carboplatin and Taxol  - Heme/Onc consulted  - patient is considering holding off on further chemotherapy given the side effects she has had which is completely reasonable  - outpatient follow-up with Dr. Salcedo - Continue taking Eliquis and Metoprolol as prescribed  - Follow up with PCP and Card as outpatient for further management

## 2020-06-02 NOTE — ED ADULT NURSE NOTE - AS TEMP SITE
oral Mucosal Advancement Flap Text: Given the location of the defect, shape of the defect and the proximity to free margins a mucosal advancement flap was deemed most appropriate. Incisions were made with a 15 blade scalpel in the appropriate fashion along the cutaneous vermilion border and the mucosal lip. The remaining actinically damaged mucosal tissue was excised.  The mucosal advancement flap was then elevated to the gingival sulcus with care taken to preserve the neurovascular structures and advanced into the primary defect. Care was taken to ensure that precise realignment of the vermilion border was achieved.

## 2020-12-16 PROBLEM — Z87.440 HISTORY OF URINARY TRACT INFECTION: Status: RESOLVED | Noted: 2018-01-18 | Resolved: 2020-12-16

## 2022-04-11 NOTE — ED ADULT TRIAGE NOTE - PAIN: PRESENCE, MLM
[Use of Plain Language] : use of plain language [Adequate] : adequate [None] : none complains of pain/discomfort

## 2022-10-24 NOTE — ED ADULT TRIAGE NOTE - ACCOMPANIED BY
Follow up phone call for left  breast biopsy performed on 10/20/2022.    Pt provided with path results by Dr. Pena. Pt denies any questions about path results.   Pathologic Diagnosis   A.   Left breast, site A, 2:00 and 8 cm from the nipple, mass, core needle biopsy:  -Invasive ductal carcinoma, moderately differentiated, see comment.     B.   Left axillary lymph node, site B, 1:00 and 13 cm from the nipple, core needle biopsy:  -Fragments of lymph node, negative for malignancy.     Education about diagnosis provided including next steps.  Explained Cancer Nurse Navigator program to pt.  Pt is agreeable to phone call from [facility].  [facility] Caron Bartholomew and Elaine Randhawa contacted and provided report on pt.  Will contact pt.    Pt understands a surgical consult is needed.  Pt states she just got off the phone with her PCP who recommended Dr. Costa. Patient denies any preference in location or surgeon but would like to be seen in the soonest opening at [facility] or [place].    Pt denies any bleeding or s/s of infection at biopsy site. There were no further questions or concerns at this time.      
Patient is scheduled to see Dr. Mondragon at [facility] on 10/27/22 at 1:30 PM.  
Immediate family member

## 2022-12-21 NOTE — PROVIDER CONTACT NOTE (CRITICAL VALUE NOTIFICATION) - RECOMMENDATIONS
Medication changes made today:  None    Tests Ordered:  None    Other:  None    Understanding your instructions:  If you feel that things may have been explained in a way that you do not understand or did not receive easy to understand instruction, please contact me at [phone] and I would be more than happy to review your plan of care with you. Your healthcare is important to us and we want to make sure you understand your directions.    Our Cardiology Team will continue to collaborate and work very closely together to best meet your needs.    Thank you for choosing us to take care of your cardiology needs. It is a pleasure to work with you, and again, please contact us for any questions or concerns anytime.      NP made aware, supplement Magnesium

## 2023-05-31 NOTE — ED ADULT NURSE NOTE - BREATHING, MLM
Pt discharging home with  mother. Instructed on wound care and dressing changes.    Spontaneous, unlabored and symmetrical

## 2023-09-12 NOTE — ED ADULT NURSE REASSESSMENT NOTE - NS ED NURSE REASSESS COMMENT FT1
RN Break Coverage : Alert and oriented x 4. Pt received from RN Hailee in no distress. VSS.  Pt resting at this time. Will continue to monitor. Spray Paint Text: The liquid nitrogen was applied to the skin utilizing a spray paint frosting technique. Spray Paint Technique: No Show Topical Anesthesia Variable?: Yes Consent: The patient's consent was obtained including but not limited to risks of crusting, scabbing, blistering, scarring, darker or lighter pigmentary change, recurrence, incomplete removal and infection. Medical Necessity Clause: This procedure was medically necessary because the lesions that were treated were: Medical Necessity Information: It is in your best interest to select a reason for this procedure from the list below. All of these items fulfill various CMS LCD requirements except the new and changing color options. Detail Level: Detailed Post-Care Instructions: I reviewed with the patient in detail post-care instructions. Patient is to wear sunprotection, and avoid picking at any of the treated lesions. Pt may apply Vaseline to crusted or scabbing areas.

## 2023-09-27 NOTE — ED PROVIDER NOTE - OBJECTIVE STATEMENT
74 yo female with pmh Stage IV Endometrial CA (s/p 7 cycles of carboplatin/taxol last chemo in November 2017), AF on apixaban, HTN presents with ams.  Per EMS, neighbor called and she didn't respond and came to check on her and noted she was altered and unresponsive. On route, pt had 30 sec tonic clonic seizure with EMS, no meds given. In er, pt had partial seizure x 2, ~ q15 secs each that stopped without meds.  Pt noted to be moving left side more spon then rt.  Pt seen yesterday without complaints. Per EMS at [facility], pt converside and oriented but just weak.  Pt admitted 6 weeks ago for abscess vs necrotic tumor, was on abx. No further treatment able to be offered per note for endometrial ca. all other ROS negative except as per HPI

## 2023-10-26 NOTE — PROGRESS NOTE ADULT - PROBLEM/PLAN-6
DISPLAY PLAN FREE TEXT
Ketoconazole Pregnancy And Lactation Text: This medication is Pregnancy Category C and it isn't know if it is safe during pregnancy. It is also excreted in breast milk and breast feeding isn't recommended.

## 2023-12-15 NOTE — PATIENT PROFILE ADULT. - NS PRO PT RIGHT SUPPORT PERSON
Caller: Driss Thomas    Doctor/Office: /David    #: [phone]    Escalation: Surgery Patient would like to know if she can get her foot wet? Please return call and advise.  Thank you
Declines

## 2024-01-01 NOTE — H&P ADULT - NSHPPHYSICALEXAM_GEN_ALL_CORE
PHYSICAL EXAM:    GENERAL: Comfortable, no acute distress   HEAD:  Normocephalic, atraumatic  EYES: EOMI, PERRLA   HEENT: Moist mucous membranes, poor dentition  NECK: Supple, No JVD  NERVOUS SYSTEM:  AAOx3, strength grossly in tact in upper and lower extremities bilaterally, decreased sensation in plantar aspect of feet bilaterally to light touch   CHEST/LUNG: Clear to auscultation bilaterally  HEART: irregularly irregular, no murmurs appreciated   ABDOMEN: +BS, palpable firm uterus in lower quadrants extending to the umbilicus, mildly tender to deep palpation in the LLQ  EXTREMITIES:   No clubbing, cyanosis, or edema noted, decreased sensation to light touch on the soles of the feet bilaterally   MUSCULOSKELETAL: No muscle tenderness, no joint tenderness  SKIN: warm and dry, no rash
English

## 2024-01-16 NOTE — DISCHARGE NOTE ADULT - MEDICATION SUMMARY - MEDICATIONS TO TAKE
Location: [facility]  Address: [address] [place] [address] [address] [place] [id number]    Writer attempted phone contact with Dylan's mom to confirm today's scheduled home visit.  Writer received no answer and left a voice message requesting return contact to confirm or reschedule if needed.  Writer provided writer's contact information.  Writer also sent mom a text message requesting return contact.   I will START or STAY ON the medications listed below when I get home from the hospital:    acetaminophen 325 mg oral tablet  -- 2 tab(s) by mouth every 6 hours, As needed, Mild Pain (1 - 3)  -- Indication: For Pain, neoplasm-related    oxyCODONE 10 mg oral tablet  -- 1 tab(s) by mouth every 6 hours, As Needed MDD:4 tabs / day  -- Indication: For Pain, neoplasm-related    gabapentin 600 mg oral tablet  -- 1 tab(s) by mouth 2 times a day   -- Indication: For Pain, neoplasm-related    metoprolol tartrate 25 mg oral tablet  -- 1 tab(s) by mouth 2 times a day  -- Indication: For Atrial fibrillation    amoxicillin-clavulanate 875 mg-125 mg oral tablet  -- 1 tab(s) by mouth 2 times a day   -- Finish all this medication unless otherwise directed by prescriber.  Take with food or milk.    -- Indication: For Infection

## 2024-02-13 NOTE — DISCHARGE NOTE ADULT - DISCHARGE DATE
08-May-2018 [Headache] : headache [Aura] : aura [Nausea] : nausea [Neck Pain] : neck pain [> 4 hours] : > 4 hours [FreeTextEntry1] : Migraine has returned - most often during dialysis. No migraine 7-8 years "If I catch it at the end of Dialysis with 2 tylenol and coffee I am sometimes ok ." Migraines returned ~April 2023 but now is left sided when it was right sided .  She has spoken to her Nephrologist about   Dialysis is T,Th and Saturday  Pt is requesting refill of Zomig  [Dizziness] : no dizziness [Vomiting] : no Vomiting [Photophobia] : no photophobia [Phonophobia] : no phonophobia [Numbness] : no numbness [Tingling] : no tingling [Weakness] : no weakness

## 2024-02-29 NOTE — SWALLOW BEDSIDE ASSESSMENT ADULT - SWALLOW EVAL: SECRETION MANAGEMENT
[No Acute Distress] : no acute distress [Well Nourished] : well nourished [Well Developed] : well developed [IV] : Mallampati Class: IV [No Resp Distress] : no resp distress [No Acc Muscle Use] : no acc muscle use [Clear to Auscultation Bilaterally] : clear to auscultation bilaterally [No Focal Deficits] : no focal deficits [Oriented x3] : oriented x3 [TextBox_44] : throat clearing + left corner drooling

## 2025-04-25 NOTE — ED ADULT NURSE NOTE - NS ED NURSE REPORT GIVEN TO FT
4/24/25  B/C [employer] B[employer]S [employer] [place]  [phone]  (1 HR & 1 MIN.)  PER: MAIRA SCHWAB  EFFECTIVE 12/1/13  DOTTY IS IN NETWORK  CPT  X2  DX Q66.71 AND Q66.72 ARE VALID AND BILLABLE  NO PRIOR AUTHORIZATION IS REQUIRED  CALL PROVIDER SERVICES AT [phone] FOR BENEFITS  REFERENCE #[id number]    PROVIDER SERVICES  [phone]  PER: JAEL URIOSTEGUI  CPT  X2  DX Q66.71 AND Q66.72 ARE VALID AND BILLABLE, BASED ON MEDICAL NECESSITY  PLAN PAYS % OF THE ALLOWED AMT  NO DEDUCTIBLE  $40 COPAY APPLIES  PLAN ALLOWS 1 PAIR PER CALENDAR YEAR  REFERENCE #[id number]    4/25/25  LMOM FOR PATIENT WITH THE ABOVE INFO. ASKED HER TO CALL THE OFFICE TO SCHEDULE. THANK YOU!       Anabella